# Patient Record
Sex: FEMALE | Race: WHITE | NOT HISPANIC OR LATINO | Employment: FULL TIME | URBAN - METROPOLITAN AREA
[De-identification: names, ages, dates, MRNs, and addresses within clinical notes are randomized per-mention and may not be internally consistent; named-entity substitution may affect disease eponyms.]

---

## 2017-02-27 DIAGNOSIS — M06.4 UNDIFFERENTIATED INFLAMMATORY ARTHRITIS (H): ICD-10-CM

## 2017-02-28 RX ORDER — HYDROXYCHLOROQUINE SULFATE 200 MG/1
200 TABLET, FILM COATED ORAL 2 TIMES DAILY
Qty: 180 TABLET | Refills: 1 | Status: SHIPPED | OUTPATIENT
Start: 2017-02-28 | End: 2017-10-06

## 2017-02-28 NOTE — TELEPHONE ENCOUNTER
PLAQUENIL 200 MG       Last Written Prescription Date:  9/30/16  Last Fill Quantity: 180,   # refills: 1  Last Office Visit : 12/16/16  Future Office visit:  6/23/17  EYE EXAM   9/28/16  OUTSIDE RECORD

## 2017-10-06 DIAGNOSIS — M06.4 UNDIFFERENTIATED INFLAMMATORY ARTHRITIS (H): ICD-10-CM

## 2017-10-06 NOTE — LETTER
Karlo Snow,    LAST EYE EXAM  9/28/16    Regular eye exams are required while taking Hydroxychloroquine (Plaquenil). These may be yearly or as determined by your eye specialist.    Although vision problems and loss of sight while taking hydroxchloroquine are very rare, notify your doctor if you notice changes in your vision. Visual changes experienced early on the medication or seen early during regular eye exams usually improve after stopping the medication.     Eye exams should be completed by an ophthalmologist who is experienced in monitoring for Hydroxchloroquine toxicity.    Exam may include visual field testing and slit lamp exam or other testing as determined by the ophthalmologist.     We received a refill request from your pharmacy. Your Hydroxychloroquine prescription has been refilled for 90 DAYS. Please request your eye clinic to fax or mail a copy of your eye exam report to our clinic indicating that testing was completed for toxicity screening.    Sincerely,  Rheumatology Staff

## 2017-10-09 NOTE — TELEPHONE ENCOUNTER
PLAQUENIL  200 MG       Last Written Prescription Date:  2/28/17  Last Fill Quantity: 180,   # refills: 1  Last Office Visit : 12/16/16  Future Office visit:  10/13/17  EYE  EXAM    9/28/16  OUTSIDE REC  OVERDUE EYE EXAM LETTER + 90 DAY RF

## 2017-10-13 ENCOUNTER — OFFICE VISIT (OUTPATIENT)
Dept: RHEUMATOLOGY | Facility: CLINIC | Age: 42
End: 2017-10-13
Attending: INTERNAL MEDICINE
Payer: COMMERCIAL

## 2017-10-13 VITALS
WEIGHT: 148.8 LBS | DIASTOLIC BLOOD PRESSURE: 84 MMHG | BODY MASS INDEX: 29.21 KG/M2 | TEMPERATURE: 98.6 F | RESPIRATION RATE: 18 BRPM | SYSTOLIC BLOOD PRESSURE: 136 MMHG | HEART RATE: 77 BPM | HEIGHT: 60 IN

## 2017-10-13 DIAGNOSIS — Z51.81 ENCOUNTER FOR MEDICATION MONITORING: ICD-10-CM

## 2017-10-13 DIAGNOSIS — M19.90 INFLAMMATORY ARTHRITIS: Primary | ICD-10-CM

## 2017-10-13 DIAGNOSIS — M19.90 INFLAMMATORY ARTHRITIS: ICD-10-CM

## 2017-10-13 LAB
ALBUMIN SERPL-MCNC: 4 G/DL (ref 3.4–5)
ALBUMIN UR-MCNC: NEGATIVE MG/DL
ALT SERPL W P-5'-P-CCNC: 35 U/L (ref 0–50)
APPEARANCE UR: ABNORMAL
AST SERPL W P-5'-P-CCNC: 19 U/L (ref 0–45)
BACTERIA #/AREA URNS HPF: ABNORMAL /HPF
BASOPHILS # BLD AUTO: 0.1 10E9/L (ref 0–0.2)
BASOPHILS NFR BLD AUTO: 0.8 %
BILIRUB UR QL STRIP: NEGATIVE
COLOR UR AUTO: YELLOW
CREAT SERPL-MCNC: 0.91 MG/DL (ref 0.52–1.04)
CREAT UR-MCNC: 124 MG/DL
CRP SERPL-MCNC: <2.9 MG/L (ref 0–8)
DIFFERENTIAL METHOD BLD: NORMAL
EOSINOPHIL # BLD AUTO: 0.1 10E9/L (ref 0–0.7)
EOSINOPHIL NFR BLD AUTO: 1.6 %
ERYTHROCYTE [DISTWIDTH] IN BLOOD BY AUTOMATED COUNT: 11.8 % (ref 10–15)
ERYTHROCYTE [SEDIMENTATION RATE] IN BLOOD BY WESTERGREN METHOD: 18 MM/H (ref 0–20)
GFR SERPL CREATININE-BSD FRML MDRD: 67 ML/MIN/1.7M2
GLUCOSE UR STRIP-MCNC: NEGATIVE MG/DL
HCT VFR BLD AUTO: 37.5 % (ref 35–47)
HGB BLD-MCNC: 12.6 G/DL (ref 11.7–15.7)
HGB UR QL STRIP: NEGATIVE
IMM GRANULOCYTES # BLD: 0 10E9/L (ref 0–0.4)
IMM GRANULOCYTES NFR BLD: 0.3 %
KETONES UR STRIP-MCNC: NEGATIVE MG/DL
LEUKOCYTE ESTERASE UR QL STRIP: ABNORMAL
LYMPHOCYTES # BLD AUTO: 1.8 10E9/L (ref 0.8–5.3)
LYMPHOCYTES NFR BLD AUTO: 25.2 %
MCH RBC QN AUTO: 30.5 PG (ref 26.5–33)
MCHC RBC AUTO-ENTMCNC: 33.6 G/DL (ref 31.5–36.5)
MCV RBC AUTO: 91 FL (ref 78–100)
MONOCYTES # BLD AUTO: 0.7 10E9/L (ref 0–1.3)
MONOCYTES NFR BLD AUTO: 9 %
MUCOUS THREADS #/AREA URNS LPF: PRESENT /LPF
NEUTROPHILS # BLD AUTO: 4.6 10E9/L (ref 1.6–8.3)
NEUTROPHILS NFR BLD AUTO: 63.1 %
NITRATE UR QL: NEGATIVE
NRBC # BLD AUTO: 0 10*3/UL
NRBC BLD AUTO-RTO: 0 /100
PH UR STRIP: 5 PH (ref 5–7)
PLATELET # BLD AUTO: 283 10E9/L (ref 150–450)
PROT UR-MCNC: 0.18 G/L
PROT/CREAT 24H UR: 0.15 G/G CR (ref 0–0.2)
RBC # BLD AUTO: 4.13 10E12/L (ref 3.8–5.2)
RBC #/AREA URNS AUTO: 1 /HPF (ref 0–2)
SOURCE: ABNORMAL
SP GR UR STRIP: 1.01 (ref 1–1.03)
SQUAMOUS #/AREA URNS AUTO: 4 /HPF (ref 0–1)
UROBILINOGEN UR STRIP-MCNC: 0 MG/DL (ref 0–2)
WBC # BLD AUTO: 7.3 10E9/L (ref 4–11)
WBC #/AREA URNS AUTO: 2 /HPF (ref 0–2)

## 2017-10-13 PROCEDURE — 84450 TRANSFERASE (AST) (SGOT): CPT | Performed by: INTERNAL MEDICINE

## 2017-10-13 PROCEDURE — 86147 CARDIOLIPIN ANTIBODY EA IG: CPT | Performed by: INTERNAL MEDICINE

## 2017-10-13 PROCEDURE — 86225 DNA ANTIBODY NATIVE: CPT | Performed by: INTERNAL MEDICINE

## 2017-10-13 PROCEDURE — 86146 BETA-2 GLYCOPROTEIN ANTIBODY: CPT | Performed by: INTERNAL MEDICINE

## 2017-10-13 PROCEDURE — 00000401 ZZHCL STATISTIC THROMBIN TIME NC: Performed by: INTERNAL MEDICINE

## 2017-10-13 PROCEDURE — 81001 URINALYSIS AUTO W/SCOPE: CPT | Performed by: INTERNAL MEDICINE

## 2017-10-13 PROCEDURE — 85025 COMPLETE CBC W/AUTO DIFF WBC: CPT | Performed by: INTERNAL MEDICINE

## 2017-10-13 PROCEDURE — 82040 ASSAY OF SERUM ALBUMIN: CPT | Performed by: INTERNAL MEDICINE

## 2017-10-13 PROCEDURE — 36415 COLL VENOUS BLD VENIPUNCTURE: CPT | Performed by: INTERNAL MEDICINE

## 2017-10-13 PROCEDURE — 99212 OFFICE O/P EST SF 10 MIN: CPT | Mod: ZF

## 2017-10-13 PROCEDURE — 85730 THROMBOPLASTIN TIME PARTIAL: CPT | Performed by: INTERNAL MEDICINE

## 2017-10-13 PROCEDURE — 84460 ALANINE AMINO (ALT) (SGPT): CPT | Performed by: INTERNAL MEDICINE

## 2017-10-13 PROCEDURE — 85652 RBC SED RATE AUTOMATED: CPT | Performed by: INTERNAL MEDICINE

## 2017-10-13 PROCEDURE — 82565 ASSAY OF CREATININE: CPT | Performed by: INTERNAL MEDICINE

## 2017-10-13 PROCEDURE — 86140 C-REACTIVE PROTEIN: CPT | Performed by: INTERNAL MEDICINE

## 2017-10-13 PROCEDURE — 00000167 ZZHCL STATISTIC INR NC: Performed by: INTERNAL MEDICINE

## 2017-10-13 PROCEDURE — 84156 ASSAY OF PROTEIN URINE: CPT | Performed by: INTERNAL MEDICINE

## 2017-10-13 PROCEDURE — 86038 ANTINUCLEAR ANTIBODIES: CPT | Performed by: INTERNAL MEDICINE

## 2017-10-13 PROCEDURE — 86160 COMPLEMENT ANTIGEN: CPT | Performed by: INTERNAL MEDICINE

## 2017-10-13 PROCEDURE — 85613 RUSSELL VIPER VENOM DILUTED: CPT | Performed by: INTERNAL MEDICINE

## 2017-10-13 RX ORDER — HYDROXYCHLOROQUINE SULFATE 200 MG/1
200 TABLET, FILM COATED ORAL 2 TIMES DAILY
Qty: 180 TABLET | Refills: 0 | Status: SHIPPED | OUTPATIENT
Start: 2017-10-13 | End: 2018-01-11

## 2017-10-13 ASSESSMENT — PAIN SCALES - GENERAL: PAINLEVEL: NO PAIN (0)

## 2017-10-13 NOTE — LETTER
10/13/2017      RE: Gwendolyn Melgar  [address] [facility] [id number]       Rheumatology F/U Note    Date of last visit: 12/16/2016  Today's visit date: 10/13/2017    Reason for visit: Seronegative non-erosive inflammatory arthritis        HPI from initial visit    Gwendolyn Melgar is a 38 yo WF who was referred to our clinic for evaluation and management of her IA.    On 1/30/2006, she woke up with pain/swelling of L 2nd finger. Was treated with prednisone and reportedly L hand MRI showed inflammatory arthritis. She had neg HLA-B27, NANCY, RF and anti-CCP in 2006. She was referred to rheumatology and was put on SSZ (? dose, ?2-3 tab twice a day). She was able to make a full fist and her joint sx improved. She was on that x 6 months and was told to stop it. Her arthritis was under good control x 3 yr.     In 2010, started having low back pain. She had problem with low back pain and getting out of the chair. At that time, had hand AM stiffness in AM to the point that could not open her hands. She was put on lodine. It helped her. Was told to taper the dose from 2 a day to 1 a day.    Last summer, they bought a new house and her father had double lung transplant. She started having low back pain in 10/2013, had severe pain to the point that she could not get off the floor without help. She was prescribed prednisone 20 max over 20 days. It helped her. Had unremarkable L hip x-ray. Never had SI joint x-ray or MRI.    Was recommended to start anti-TNF Tx but she is concerned about side effects and is seeking second opinion.      Reports fatigue. Currently has a cold with dry cough. Has h/o alopecia areata, has thin hair. Has occasional diarrhea sec anxiety, anxiety on lexapro, myalgia, occasional numbness/tingling ta night in hands which wakes her up at night.    Today, has pain around shoulders and hips which could be sec sitting at desc and cold.    Interval hx: Her extensive rheumatologic work up at initial visit was  negative. I diagnosed her with seroneg non-erosive IA and advised her to increase lodine from 1 to 2 a day which she did but could not tolerate it a sit caused GI upset. She had pain/tenderness/swelling of PIP joints with AM stiffness> 1 hr. Prescribed her  mg bid in 3/2014, she reports significant improvement of joint sx on it. Tolerates it well. Was also put her on cymbalta at the same time helped with her mood and back pain. She stopped cymbalta as had pregnancy plan, reports occasional LBP off cymbalta. Had leg cramps at last visit, zanaflex was prescribed but leg cramps resolved on its won and she did not try the zanaflex. R hip bursitis improved after doing PT.    She is feeling well today. Just found out to be pregnant on Maddox's day. She is 6 wk pregnant, is excited about it. Her TG is 10/17/2015. She is due for eye exam in 9/2015. She is still taking the HCQ and is helping, no joint pain/swelling or AM stiffness. Had pain over R foot on walking about 6 wk ago when she was in [place], it eventually resolved on its own.        12/2016: Doing well with no joint pain or arthritis flare. Now is going through IVF, had one round in 11/2016 which was not successful, now is going to do another round in January 2017. She developed R sciatica pain after 1st round of IVF which resolved after working with chiropractor. 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 I don't have her test results, she thinks they were tested only once.      Today: No arthritis flare up since last visit, doing well. Has ongoing aching around shoulders and low back which is intermittent, started to see a chiropractor and it's helping. She failed IVF, one fresh embryo (x2 embryos) transfer and one frozen embryo transfer. Now is on waiting list to get donor embryo, is hoping to get one by March 2018. 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 Her OB believes that + APS antibodies might explain the fetal loss at 12 wk when Gwendolyn got pregnant without assistance; however it was never confirmed, no genetic testing was done on fetus.    She reports no recurrence of hand stiffness or pain since she started taking HCQ.       ROS:  A comprehensive ROS was done, positives are per HPI.      Interval History (2015)  Pt states that she had a spontaneous  at approximately 12 weeks.  Since that time she has had a recurrence of her bilateral lower hip and back pain that started approximately 1-2 months after the miscarriage occurred.  Morning stiffness duration varies with activity and achiness that lasts for approximately on hour. Pt states that he rpain is currenlty a 1-2/10 at its maximum, 3-4/10 at maximum.  Ice and physical therapy alleviated pain.  She is not taking any analgesics.  Achiness can return at the end of the day.  Pt states taht she feels taht her symptoms are being well controlled on plaquenil and that she is not experiencing any side effects.  She states that she would like to remain on the plaquenil.  She is trying to become pregnant again, so she does not want to initiate any medications that are potentially teratogenic.  She states that she doesn't think that she needs a short course of prednisone to deal with her current symptoms    Pt states that in February she had an acute attack of right 1st metatarsophalangeal joint pain.  Pt denies callor, edema, or warmth.  Pt states that the pain is severe to the point that she can't walk on it.  Pain is worse than her normal arthritis pain.  Pt does not drink.  No association with seafood, organ meats.  Pt states that she was eating a lot of red meats during that period.     ROS:  Affirms SI joint and trochanteric pain  with 1 hour of morning stiffness.  Denies HA, fevers, chills, night sweats, changes in vision, sicca symptoms, ulcers, epistaxis, lumps and bumps in neck, chest pain, palpitations, SOB, stomach pain, n/v/d, constipation, hematochezia, melena, dysuria, hematuria, weakness of muscles, new rashes, raynaud's.    Today: Had neg HLA-B27 and neg pelvic MRI for sacroiliitis. Her SI joint pain resolved. Reports intermittent B/L shoulder pain and R hip bursitis pain but overall her pain much less and she did not have as much pain and problem with her joints this past winter. After having a miscarriage at 11 wk, now has infertility problem. Reports developing ovarian cyst after taking clomiphene, now is on both metformin and OC, will try fertility meds again after resolution of cysts. She reports having diarrhea on metformin but now it's better.    Her limited rheumatology records were reviewed.    This Document is currently in Final Status  Exam Accession# [id number]     Signs and Symptoms for Radiological Exam from [facility]:       * hip pain  pt states left hip pain no injury  [facility] [id number]  History from ID[facility]:       * ARTHROPATHY, UNSPECIFIED, SITE UNSPECIFIED; PAIN IN JOINT, PELVIC REGION AND THIGH  AP PELVIS AND LATERAL VIEW LEFT HIP 11/05/2012     COMPARISON: AP pelvis 05/11/2006.     FINDINGS: The pelvic ring is intact. Normal alignment of the left hip.  The joint spaces are symmetric. Tiny accessory ossicle at the lateral  aspect of the left acetabular roof. This is stable from the previous  study.     tt        Examination Interpreted at: [facility],   [place], [place]  The Interpreting Physician is FERNANDO DAVIS  Exam was completed at [facility]  Component      Latest Ref Rng 1/23/2014 1/23/2014          12:00 AM 12:00 AM   Sodium      137 - 145 mmol/L 137    Potassium      3.6 - 5.0 mmol/L 4.4    Chloride      98 - 107 mmol/L 99    CARBON DIOXIDE, TOTAL      22 - 35 mmol/L 26    Anion Gap       12     Glucose      65 - 100 mg/dL 100    Urea Nitrogen      7 - 20 mg/dL 14    Creatinine      0.5 - 1.0 mg/dL 0.7    Calcium      8.4 - 10.2 mg/dL 9.3    Protein Total      6.3 - 8.2 g/dL 7.1    Albumin      3.5 - 5.0 g/dL 4.2    Bilirubin Total      0.2 - 1.3 mg/dL 0.5    Alkaline Phosphatase      38 - 126 U/L 47    AST      14 - 59 U/L 26    ALT      9 - 72 U/L 28    RNP Antibodies      0.0 - 0.9 AI <0.2 <0.2   Kevin Antibodies      0.0 - 0.9 AI <0.2 <0.2   Scleroderma Antibody Scl-70 ANA CRISTINA IgG      0.0 - 0.9 AI  <0.2   Sjogren's Anti-SS-A      0.0 - 0.9 AI  <0.2   Sjogren's Anti-SS-B      0.0 - 0.9 AI  <0.2   Antichromatin Antibodies      0.0 - 0.9 AI  <0.2   Anti-Laura-1      0.0 - 0.9 Al  <0.2   Centomere B Atb      0.0 - 0.9 AI  <0.2   QuantiFERON TB Gold       Neg    QuantiFERON TB Ag Value       0.05    QuantiFERON Nil Value       0.05    QuantiFERON Mitogen Value       >10.00    QFT TB Ag minus Nil Value       0.00    NANCY Screen by EIA       Neg    Hepatitis C PANKAJ      0.0 - 0.9 0.1    Vitamin D,25-Hydroxy      30.0 - 100.0 ng/mL 34.8    HBsAg Screen       Neg    Hepatitis B Core Antibody       Neg    Complement C4      9 - 36 16    Complement C3      90 - 180 153    Rheumatoid Factor      0.0 - 13.9 KIU/L 9.8    C-Reactive Protein      0.0 - 4.9 mg/dL 2.7    Anti-DNA (DS) Ab Qn      0 - 9 IU/mL 9    CCP Antibodies      0 - 19 U/mL 3      Exam: Bilateral wrists, 3 views each. 1/10/2014.    Comparison: None.    Clinical history: Arthropathy.    Findings: 3 views each of the bilateral wrists were obtained. Joint  spaces are well-maintained. No erosive changes are noted. No soft  tissue abnormalities are seen.            Result Impression       Impression: No erosive changes in the bilateral wrists.    AVELINA HIGGINBOTHAM MD  I have personally reviewed the image and initial interpretation, and I  agree with findings.     Exam: Bilateral hands, 3 views each. 1/10/2014.    Comparison: None.    Clinical history:  Arthropathy.    Findings: 3 views each of the bilateral hands were obtained. The joint  spaces are well-maintained. No soft tissue abnormalities are noted. No  erosive changes are seen.            Result Impression       Impression: No erosive changes in the bilateral hands.    AVELINA HIGGINBOTHAM MD  I have personally reviewed the image and initial interpretation, and I  agree with findings.     Exam: Sacroiliac joints, 3 views. 1/10/2014.    Comparison: None.    Clinical history: Arthropathy.    Findings: 3 views of the sacroiliac joints were obtained. The  sacroiliac joints are patent. No abnormal sclerosis is noted. No  definite erosive changes are seen.            Result Impression       Impression: No acute bone abnormality in the sacroiliac joints.    AVELINA HIGGINBOTHAM MD  I have personally reviewed the image and initial interpretation, and I  agree with findings.          Component      Latest Ref Rng 2/20/2015   WBC      4.0 - 11.0 10e9/L 11.1 (H)   RBC Count      3.8 - 5.2 10e12/L 4.28   Hemoglobin      11.7 - 15.7 g/dL 13.1   Hematocrit      35.0 - 47.0 % 38.9   MCV      78 - 100 fl 91   MCH      26.5 - 33.0 pg 30.6   MCHC      31.5 - 36.5 g/dL 33.7   RDW      10.0 - 15.0 % 13.1   Platelet Count      150 - 450 10e9/L 238   Diff Method       Automated Method   % Neutrophils       66.4   % Lymphocytes       21.7   % Monocytes       9.6   % Eosinophils       1.5   % Basophils       0.4   % Immature Granulocytes       0.4   Absolute Neutrophil      1.6 - 8.3 10e9/L 7.4   Absolute Lymphocytes      0.8 - 5.3 10e9/L 2.4   Absolute Monoctyes      0.0 - 1.3 10e9/L 1.1   Absolute Eosinophils      0.0 - 0.7 10e9/L 0.2   Absolute Basophils      0.0 - 0.2 10e9/L 0.0   Abs Immature Granulocytes      0 - 0.4 10e9/L 0.0   Creatinine      0.52 - 1.04 mg/dL 0.72   GFR Estimate      >60 mL/min/1.7m2 90   GFR Estimate If Black      >60 mL/min/1.7m2 >90 . . .   CRP Inflammation      0.0 - 8.0 mg/L 3.5   Sed Rate      0 - 20 mm/h 9    Albumin      3.4 - 5.0 g/dL 3.8   ALT      0 - 50 U/L 67 (H)   AST      0 - 45 U/L 28     Component      Latest Ref Rng 8/21/2015   WBC      4.0 - 11.0 10e9/L 7.9   RBC Count      3.8 - 5.2 10e12/L 4.50   Hemoglobin      11.7 - 15.7 g/dL 13.8   Hematocrit      35.0 - 47.0 % 40.0   MCV      78 - 100 fl 89   MCH      26.5 - 33.0 pg 30.7   MCHC      31.5 - 36.5 g/dL 34.5   RDW      10.0 - 15.0 % 12.6   Platelet Count      150 - 450 10e9/L 273   Diff Method       Automated Method   % Neutrophils       66.4   % Lymphocytes       23.6   % Monocytes       8.0   % Eosinophils       1.3   % Basophils       0.3   % Immature Granulocytes       0.4   Absolute Neutrophil      1.6 - 8.3 10e9/L 5.3   Absolute Lymphocytes      0.8 - 5.3 10e9/L 1.9   Absolute Monoctyes      0.0 - 1.3 10e9/L 0.6   Absolute Eosinophils      0.0 - 0.7 10e9/L 0.1   Absolute Basophils      0.0 - 0.2 10e9/L 0.0   Abs Immature Granulocytes      0 - 0.4 10e9/L 0.0   Creatinine      0.52 - 1.04 mg/dL 0.78   GFR Estimate      >60 mL/min/1.7m2 81   GFR Estimate If Black      >60 mL/min/1.7m2 >90 . . .   [id number] Method       SSOP   B locus       B27 Neg   CRP Inflammation      0.0 - 8.0 mg/L <2.9   Sed Rate      0 - 20 mm/h 11   Albumin      3.4 - 5.0 g/dL 4.2   ALT      0 - 50 U/L 50   AST      0 - 45 U/L 29   HLA B27 Typing       Specimen received - Immunology report to follow upon completion.   Exam: MRI of the sacroiliac joints dated 10/21/2015.  Comparison: 1/10/2014.  Clinical history: Evaluate for sacroiliitis.  Technique: Multiplanar, multisequence MR imaging of the sacroiliac  joints were obtained using standard sequences in 2 orthogonal planes  before and after the uneventful administration of intravenous  gadolinium contrast.  Findings:  No significant fluid in the sacroiliac joints. No abnormal enhancement  to suggest sacroiliitis. No erosive changes are noted.  The muscle bulk is intact without significant atrophy or edema. The  visualized  intrapelvic structures are unremarkable. No  lymphadenopathy. No full-thickness tear or tendon retraction.  No abnormal marrow signal to suggest fracture, osteonecrosis, or  marrow infiltration. Nonspecific subtle focus of T2 hyperintensity  within the left iliac bone, coronal series 3 image 10, likely  vascularity.  Large field-of-view limits evaluation the hip joints. No  full-thickness cartilage loss or joint effusion. The visualized lower  lumbar spine is unremarkable.  IMPRESSION  Impression:  1. No findings to suggest sacroiliitis.  2. No abnormal marrow signal to suggest fracture, osteonecrosis, or  marrow infiltration.  AVELINA HIGGINBOTHAM MD  Component      Latest Ref Rng & Units 12/16/2016   Cardiolipin Antibody IgG      0.0 - 19.9 GPL-U/mL 3.2   Cardiolipin Antibody IgM      0.0 - 19.9 MPL-U/mL 27.7 (H)   Cardiolipin Antibody IgA      0.0 - 19.9 APL U/mL 5.7   Beta 2 Glycoprotein 1 Antibody IgA      <7 U/mL 1.6     HISTORY REVIEW:  Past Medical History:   Diagnosis Date     Asthma      Hypertension      Inflammatory arthritis      Past Surgical History:   Procedure Laterality Date     CHOLECYSTECTOMY       GALLBLADDER SURGERY       TONSILLECTOMY       Family History   Problem Relation Age of Onset     Lupus Paternal Aunt      father had double lung transplant for alpha 1 anti-trypsin def     Arthritis Maternal Grandmother      RA     Hypertension Maternal Grandmother      Depression Maternal Grandmother      Arthritis Paternal Grandfather      RA     Family History Negative Other      neg for AS, psoriasis     GASTROINTESTINAL DISEASE Other      cousin has colitis     DIABETES Father      due to transplant     Allergies Father      DIABETES Other      Aunts on both sides     Hypertension Mother      Allergies Mother      Hypertension Maternal Grandfather      Arthritis Maternal Grandfather      Arthritis Paternal Grandmother      Other - See Comments Other      fibromyalgia - paternal aunt     Social History      Social History     Marital status:      Spouse name: N/A     Number of children: 0     Years of education: N/A     Occupational History      [employer]     Social History Main Topics     Smoking status: Never Smoker     Smokeless tobacco: Never Used     Alcohol use Yes      Comment: occ     Drug use: No     Sexual activity: Not on file     Other Topics Concern     Not on file     Social History Narrative       PMHx, FHx, SHx were reviewed, unchanged.    Pregnancy Hx: She is  s/p one miscarriage around 12 wk    Outpatient Encounter Prescriptions as of 10/13/2017   Medication Sig Dispense Refill     hydroxychloroquine (PLAQUENIL) 200 MG tablet Take 1 tablet (200 mg) by mouth 2 times daily 180 tablet 1     metFORMIN (GLUCOPHAGE) 500 MG tablet        norethindrone-ethinyl estradiol-iron (ESTROSTEP FE) 1-20/1-30/1-35 MG-MCG per tablet Take 1 tablet by mouth daily       cetirizine (ZYRTEC) 10 MG tablet Take 10 mg by mouth as needed for allergies       labetalol (NORMODYNE) 200 MG tablet Take 100 mg by mouth 2 times daily       Prenatal Multivit-Min-Fe-FA (PRENATAL VITAMINS PO) With iron       VITAMIN D, CHOLECALCIFEROL, PO Take 2,000 Units by mouth daily       albuterol (VENTOLIN HFA) 108 (90 BASE) MCG/ACT inhaler Inhale 2 Puffs into the lungs four times a day as needed for Wheezing or Shortness of Breath (as needed). Shake before using.       fluticasone (FLOVENT HFA) 110 MCG/ACT inhaler Inhale 2 Puffs into the lungs two times a day. 4 puffs twice daily in yellow zone       No facility-administered encounter medications on file as of 10/13/2017.      Allergies   Allergen Reactions     Tetanus Immune Globulin      Fever     Tetanus Toxoid            Ph.E:    Vitals:    10/13/17 1431   BP: 136/84   Pulse: 77   Resp: 18   Temp: 98.6  F (37  C)   TempSrc: Oral   Weight: 67.5 kg (148 lb 12.8 oz)   Height: 1.524 m (5')       Constitutional: WD/WN. Pleasant. In no acute distress.   Eyes: EOM intact,  PERRLA, sclera anicteric, conj not injected  HEENT: No oral ulcers or thrush. Normal salivary pool.  Neck: No cervical LAP  Chest: Clear to auscultation bilaterally  CV: RRR, no murmurs/ rubs or gallops. No edema, clubbing or cyanosis.   GI: Abdomen is soft and non tender.     MS: no joint tenderness. No active synovitis. No joint deformities.  No nodules. +FM TP  Skin: No skin rash, malar rash, livedo, periungual erythema,digital ulcers or nail changes. + thinning of hair (allopecia).  Neuro: A&O x 3. 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 Received MRI report of L index finger 4/06  which showed L index finger edema from PIP to DIP (non specific finding). Her work up at initial visit in 1/2014 was suggestive of seroneg non-erosive IA. She was recommended to increase lodine to 2 tab a day but could not tolerate it sec GI upset. Has FM TP but FMS can't explain all her pain including pain/tenderness/swelling over PIP joints, AM stiffness> 1hr and good response to steroid/SSZ in the past. For AI, recommended a trial of HCQ. She is on HCQ since 3/2014 with excellent response. Her IA is under excellent control on HCQ.    -neg HLA-B27 and neg pelvic MRI 10/2015 for sacroiliitis, SI joint pain resolved. Now has intermittent low back and shoulder pain, which is most likely due to FMS, seeing a chiropractor helps.    - continue plaquenil 200mg PO BID, was informed that's safe in pregnancy    -Recommend eye exam for HCQ monitoring. Referral was made.    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 She was put on ASA+lovenox during IVF. She failed IVF fresh  embryo (two) and leftover frozen embryo (one) transfer and was told given her age her best option would be to use donor embryo and now is on waiting list, hopes to get one in March 2018. She is recommended to use ASA 81 mg qd+lovenox during future pregnancy with donor embryo and I agree given her h/os miscarriage at 12 wk. She ahs no h/o thrombosis. She still does not meet criteria for APS syndrome as miscarriage was still considered 1st trimester miscarriage.    She always had neg NANCY here and no features of SLE, I will re-check NANCY along with APS Abs and lupus serology.     - follow-up in 12 months      MEDICATION CHANGES: None.    Orders Placed This Encounter   Procedures     CBC with platelets differential     AST     ALT     Albumin level     Creatinine     Complement C4     Complement C3     CRP inflammation     Erythrocyte sedimentation rate auto     DNA double stranded antibodies     NANCY by IFA: Laboratory Miscellaneous Order     Cardiolipin Edith IgG and IgM     OPHTHALMOLOGY ADULT REFERRAL           Marie Charles MD

## 2017-10-13 NOTE — MR AVS SNAPSHOT
After Visit Summary   10/13/2017    Gwendolyn Melgar    MRN: [id number]           Patient Information     Date Of Birth          1975        Visit Information        Provider Department      10/13/2017 2:30 PM Marie Charles MD [facility] Rheumatology        Today's Diagnoses     Inflammatory arthritis    -  1    Encounter for medication monitoring          Care Instructions    Labs today      Return in a year          Follow-ups after your visit        Additional Services     OPHTHALMOLOGY ADULT REFERRAL       For HCQ monitoring                  Follow-up notes from your care team     Return in about 1 year (around 10/13/2018).      Your next 10 appointments already scheduled     Oct 13, 2017  3:00 PM CDT   Lab with  LAB   [facility] ([facility])    [address] [place] [address] [id number] [phone]            Oct 12, 2018 10:30 AM CDT   (Arrive by 10:15 AM)   Return Visit with Marie Charles MD   [facility] Rheumatology ([facility])    [address] [place] [address] [id number] [phone]              Future tests that were ordered for you today     Open Future Orders        Priority Expected Expires Ordered    CBC with platelets differential Routine  10/13/2018 10/13/2017    AST Routine  10/13/2018 10/13/2017    ALT Routine  10/13/2018 10/13/2017    Albumin level Routine  10/13/2018 10/13/2017    Creatinine Routine  10/13/2018 10/13/2017    Complement C4 Routine  10/13/2018 10/13/2017    Complement C3 Routine  10/13/2018 10/13/2017    CRP inflammation Routine  10/13/2018 10/13/2017    Erythrocyte sedimentation rate auto Routine  10/13/2018 10/13/2017    DNA double stranded antibodies Routine  10/13/2018 10/13/2017    NANCY by IFA: Laboratory Miscellaneous Order Routine  10/13/2018 10/13/2017            Who to contact     If you have questions or need follow up information about today's clinic visit or  your schedule please contact [facility] RHEUMATOLOGY directly at [phone].  Normal or non-critical lab and imaging results will be communicated to you by MyChart, letter or phone within 4 business days after the clinic has received the results. If you do not hear from us within 7 days, please contact the clinic through Net[employer]agon[employer] or phone. If you have a critical or abnormal lab result, we will notify you by phone as soon as possible.  Submit refill requests through [employer] or call your pharmacy and they will forward the refill request to us. Please allow 3 business days for your refill to be completed.          Additional Information About Your Visit        [employer] Information     [employer] gives you secure access to your electronic health record. If you see a primary care provider, you can also send messages to your care team and make appointments. If you have questions, please call your primary care clinic.  If you do not have a primary care provider, please call [phone] and they will assist you.        Care EveryWhere ID     This is your Care EveryWhere ID. This could be used by other organizations to access your [place] medical records  [id number]        Your Vitals Were     Pulse Temperature Respirations Height BMI (Body Mass Index)       77 98.6  F (37  C) (Oral) 18 1.524 m (5') 29.06 kg/m2        Blood Pressure from Last 3 Encounters:   10/13/17 136/84   12/16/16 (!) 166/98   03/23/16 129/76    Weight from Last 3 Encounters:   10/13/17 67.5 kg (148 lb 12.8 oz)   12/16/16 68.6 kg (151 lb 3.2 oz)   03/23/16 70.8 kg (156 lb)              We Performed the Following     Cardiolipin Edith IgG and IgM     Lupus Anticoagulant Panel     OPHTHALMOLOGY ADULT REFERRAL        Primary Care Provider    Katy Obrien       No address on file        Equal Access to Services     NICOLE WARNER : Anahi Fernandes, nalini delvalle, jared banks, bebo johnston. So [facility]  [phone].    ATENCIÓN: Si vincent chacon, tiene a winter disposición servicios gratuitos de asistencia lingüística. Selene page [phone].    We comply with applicable federal civil rights laws and [place] laws. We do not discriminate on the basis of race, color, national origin, age, disability, sex, sexual orientation, or gender identity.            Thank you!     Thank you for choosing [facility] RHEUMATOLOGY  for your care. Our goal is always to provide you with excellent care. Hearing back from our patients is one way we can continue to improve our services. Please take a few minutes to complete the written survey that you may receive in the mail after your visit with us. Thank you!             Your Updated Medication List - Protect others around you: Learn how to safely use, store and throw away your medicines at www.disposemymeds.org.          This list is accurate as of: 10/13/17  2:54 PM.  Always use your most recent med list.                   Brand Name Dispense Instructions for use Diagnosis    cetirizine 10 MG tablet    zyrTEC     Take 10 mg by mouth as needed for allergies        FLOVENT  MCG/ACT Inhaler   Generic drug:  fluticasone      Inhale 2 Puffs into the lungs two times a day. 4 puffs twice daily in yellow zone        hydroxychloroquine 200 MG tablet    PLAQUENIL    180 tablet    Take 1 tablet (200 mg) by mouth 2 times daily    Undifferentiated inflammatory arthritis (H)       labetalol 200 MG tablet    NORMODYNE     Take 100 mg by mouth 2 times daily        metFORMIN 500 MG tablet    GLUCOPHAGE          norethindrone-ethinyl estradiol-iron 1-20/1-30/1-35 MG-MCG per tablet    ESTROSTEP FE     Take 1 tablet by mouth daily        PRENATAL VITAMINS PO      With iron        VENTOLIN  (90 BASE) MCG/ACT Inhaler   Generic drug:  albuterol      Inhale 2 Puffs into the lungs four times a day as needed for Wheezing or Shortness of Breath (as needed). Shake before using.        VITAMIN D  (CHOLECALCIFEROL) PO      Take 2,000 Units by mouth daily

## 2017-10-13 NOTE — PROGRESS NOTES
Rheumatology F/U Note    Date of last visit: 12/16/2016  Today's visit date: 10/13/2017    Reason for visit: Seronegative non-erosive inflammatory arthritis        HPI from initial visit    Gwendolyn Melgar is a 40 yo WF who was referred to our clinic for evaluation and management of her IA.    On 1/30/2006, she woke up with pain/swelling of L 2nd finger. Was treated with prednisone and reportedly L hand MRI showed inflammatory arthritis. She had neg HLA-B27, NANCY, RF and anti-CCP in 2006. She was referred to rheumatology and was put on SSZ (? dose, ?2-3 tab twice a day). She was able to make a full fist and her joint sx improved. She was on that x 6 months and was told to stop it. Her arthritis was under good control x 3 yr.     In 2010, started having low back pain. She had problem with low back pain and getting out of the chair. At that time, had hand AM stiffness in AM to the point that could not open her hands. She was put on lodine. It helped her. Was told to taper the dose from 2 a day to 1 a day.    Last summer, they bought a new house and her father had double lung transplant. She started having low back pain in 10/2013, had severe pain to the point that she could not get off the floor without help. She was prescribed prednisone 20 max over 20 days. It helped her. Had unremarkable L hip x-ray. Never had SI joint x-ray or MRI.    Was recommended to start anti-TNF Tx but she is concerned about side effects and is seeking second opinion.      Reports fatigue. Currently has a cold with dry cough. Has h/o alopecia areata, has thin hair. Has occasional diarrhea sec anxiety, anxiety on lexapro, myalgia, occasional numbness/tingling ta night in hands which wakes her up at night.    Today, has pain around shoulders and hips which could be sec sitting at desc and cold.    Interval hx: Her extensive rheumatologic work up at initial visit was negative. I diagnosed her with seroneg non-erosive IA and advised her to increase  lodine from 1 to 2 a day which she did but could not tolerate it a sit caused GI upset. She had pain/tenderness/swelling of PIP joints with AM stiffness> 1 hr. Prescribed her  mg bid in 3/2014, she reports significant improvement of joint sx on it. Tolerates it well. Was also put her on cymbalta at the same time helped with her mood and back pain. She stopped cymbalta as had pregnancy plan, reports occasional LBP off cymbalta. Had leg cramps at last visit, zanaflex was prescribed but leg cramps resolved on its won and she did not try the zanaflex. R hip bursitis improved after doing PT.    She is feeling well today. Just found out to be pregnant on Maddox's day. She is 6 wk pregnant, is excited about it. Her TG is 10/17/2015. She is due for eye exam in 9/2015. She is still taking the HCQ and is helping, no joint pain/swelling or AM stiffness. Had pain over R foot on walking about 6 wk ago when she was in [place], it eventually resolved on its own.        12/2016: Doing well with no joint pain or arthritis flare. Now is going through IVF, had one round in 11/2016 which was not successful, now is going to do another round in January 2017. She developed R sciatica pain after 1st round of IVF which resolved after working with chiropractor. 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 I don't have her test results, she thinks they were tested only once.      Today: No arthritis flare up since last visit, doing well. Has ongoing aching around shoulders and low back which is intermittent, started to see a chiropractor and it's helping. She failed IVF, one fresh embryo (x2 embryos) transfer and one frozen embryo transfer. Now is on waiting list to get donor embryo, is hoping to get one by March 2018. 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 Her OB believes that + APS antibodies might explain the fetal loss at 12 wk when Gwendolyn got pregnant without assistance; however it was never confirmed, no genetic testing was done on fetus.    She reports no recurrence of hand stiffness or pain since she started taking HCQ.       ROS:  A comprehensive ROS was done, positives are per HPI.      Interval History (2015)  Pt states that she had a spontaneous  at approximately 12 weeks.  Since that time she has had a recurrence of her bilateral lower hip and back pain that started approximately 1-2 months after the miscarriage occurred.  Morning stiffness duration varies with activity and achiness that lasts for approximately on hour. Pt states that he rpain is currenlty a 1-2/10 at its maximum, 3-4/10 at maximum.  Ice and physical therapy alleviated pain.  She is not taking any analgesics.  Achiness can return at the end of the day.  Pt states taht she feels taht her symptoms are being well controlled on plaquenil and that she is not experiencing any side effects.  She states that she would like to remain on the plaquenil.  She is trying to become pregnant again, so she does not want to initiate any medications that are potentially teratogenic.  She states that she doesn't think that she needs a short course of prednisone to deal with her current symptoms    Pt states that in February she had an acute attack of right 1st metatarsophalangeal joint pain.  Pt denies callor, edema, or warmth.  Pt states that the pain is severe to the point that she can't walk on it.  Pain is worse than her normal arthritis pain.  Pt does not drink.  No association with seafood, organ meats.  Pt states that she was eating a lot of red meats during that period.     ROS:  Affirms SI joint and trochanteric pain with 1 hour of morning stiffness.  Denies HA, fevers, chills, night sweats,  changes in vision, sicca symptoms, ulcers, epistaxis, lumps and bumps in neck, chest pain, palpitations, SOB, stomach pain, n/v/d, constipation, hematochezia, melena, dysuria, hematuria, weakness of muscles, new rashes, raynaud's.    Today: Had neg HLA-B27 and neg pelvic MRI for sacroiliitis. Her SI joint pain resolved. Reports intermittent B/L shoulder pain and R hip bursitis pain but overall her pain much less and she did not have as much pain and problem with her joints this past winter. After having a miscarriage at 11 wk, now has infertility problem. Reports developing ovarian cyst after taking clomiphene, now is on both metformin and OC, will try fertility meds again after resolution of cysts. She reports having diarrhea on metformin but now it's better.    Her limited rheumatology records were reviewed.    This Document is currently in Final Status  Exam Accession# [id number]     Signs and Symptoms for Radiological Exam from [facility]:       * hip pain  pt states left hip pain no injury  [id number]  History from [facility]:       * ARTHROPATHY, UNSPECIFIED, SITE UNSPECIFIED; PAIN IN JOINT, PELVIC REGION AND THIGH  AP PELVIS AND LATERAL VIEW LEFT HIP 11/05/2012     COMPARISON: AP pelvis 05/11/2006.     FINDINGS: The pelvic ring is intact. Normal alignment of the left hip.  The joint spaces are symmetric. Tiny accessory ossicle at the lateral  aspect of the left acetabular roof. This is stable from the previous  study.     tt        Examination Interpreted at: [facility],   [place], [place]  The Interpreting Physician is FERNANDO DAVIS  Exam was completed at [facility]  Component      Latest Ref Rng 1/23/2014 1/23/2014          12:00 AM 12:00 AM   Sodium      137 - 145 mmol/L 137    Potassium      3.6 - 5.0 mmol/L 4.4    Chloride      98 - 107 mmol/L 99    CARBON DIOXIDE, TOTAL      22 - 35 mmol/L 26    Anion Gap       12    Glucose      65 - 100 mg/dL 100    Urea Nitrogen      7 - 20 mg/dL 14     Creatinine      0.5 - 1.0 mg/dL 0.7    Calcium      8.4 - 10.2 mg/dL 9.3    Protein Total      6.3 - 8.2 g/dL 7.1    Albumin      3.5 - 5.0 g/dL 4.2    Bilirubin Total      0.2 - 1.3 mg/dL 0.5    Alkaline Phosphatase      38 - 126 U/L 47    AST      14 - 59 U/L 26    ALT      9 - 72 U/L 28    RNP Antibodies      0.0 - 0.9 AI <0.2 <0.2   Kevin Antibodies      0.0 - 0.9 AI <0.2 <0.2   Scleroderma Antibody Scl-70 ANA CRISTINA IgG      0.0 - 0.9 AI  <0.2   Sjogren's Anti-SS-A      0.0 - 0.9 AI  <0.2   Sjogren's Anti-SS-B      0.0 - 0.9 AI  <0.2   Antichromatin Antibodies      0.0 - 0.9 AI  <0.2   Anti-Laura-1      0.0 - 0.9 Al  <0.2   Centomere B Atb      0.0 - 0.9 AI  <0.2   QuantiFERON TB Gold       Neg    QuantiFERON TB Ag Value       0.05    QuantiFERON Nil Value       0.05    QuantiFERON Mitogen Value       >10.00    QFT TB Ag minus Nil Value       0.00    NANCY Screen by EIA       Neg    Hepatitis C PANKAJ      0.0 - 0.9 0.1    Vitamin D,25-Hydroxy      30.0 - 100.0 ng/mL 34.8    HBsAg Screen       Neg    Hepatitis B Core Antibody       Neg    Complement C4      9 - 36 16    Complement C3      90 - 180 153    Rheumatoid Factor      0.0 - 13.9 KIU/L 9.8    C-Reactive Protein      0.0 - 4.9 mg/dL 2.7    Anti-DNA (DS) Ab Qn      0 - 9 IU/mL 9    CCP Antibodies      0 - 19 U/mL 3      Exam: Bilateral wrists, 3 views each. 1/10/2014.    Comparison: None.    Clinical history: Arthropathy.    Findings: 3 views each of the bilateral wrists were obtained. Joint  spaces are well-maintained. No erosive changes are noted. No soft  tissue abnormalities are seen.            Result Impression       Impression: No erosive changes in the bilateral wrists.    AVELINA HIGGINBOTHAM MD  I have personally reviewed the image and initial interpretation, and I  agree with findings.     Exam: Bilateral hands, 3 views each. 1/10/2014.    Comparison: None.    Clinical history: Arthropathy.    Findings: 3 views each of the bilateral hands were obtained. The  joint  spaces are well-maintained. No soft tissue abnormalities are noted. No  erosive changes are seen.            Result Impression       Impression: No erosive changes in the bilateral hands.    AVELINA HIGGINBOTHAM MD  I have personally reviewed the image and initial interpretation, and I  agree with findings.     Exam: Sacroiliac joints, 3 views. 1/10/2014.    Comparison: None.    Clinical history: Arthropathy.    Findings: 3 views of the sacroiliac joints were obtained. The  sacroiliac joints are patent. No abnormal sclerosis is noted. No  definite erosive changes are seen.            Result Impression       Impression: No acute bone abnormality in the sacroiliac joints.    AVELINA HIGGINBOTHAM MD  I have personally reviewed the image and initial interpretation, and I  agree with findings.          Component      Latest Ref Rng 2/20/2015   WBC      4.0 - 11.0 10e9/L 11.1 (H)   RBC Count      3.8 - 5.2 10e12/L 4.28   Hemoglobin      11.7 - 15.7 g/dL 13.1   Hematocrit      35.0 - 47.0 % 38.9   MCV      78 - 100 fl 91   MCH      26.5 - 33.0 pg 30.6   MCHC      31.5 - 36.5 g/dL 33.7   RDW      10.0 - 15.0 % 13.1   Platelet Count      150 - 450 10e9/L 238   Diff Method       Automated Method   % Neutrophils       66.4   % Lymphocytes       21.7   % Monocytes       9.6   % Eosinophils       1.5   % Basophils       0.4   % Immature Granulocytes       0.4   Absolute Neutrophil      1.6 - 8.3 10e9/L 7.4   Absolute Lymphocytes      0.8 - 5.3 10e9/L 2.4   Absolute Monoctyes      0.0 - 1.3 10e9/L 1.1   Absolute Eosinophils      0.0 - 0.7 10e9/L 0.2   Absolute Basophils      0.0 - 0.2 10e9/L 0.0   Abs Immature Granulocytes      0 - 0.4 10e9/L 0.0   Creatinine      0.52 - 1.04 mg/dL 0.72   GFR Estimate      >60 mL/min/1.7m2 90   GFR Estimate If Black      >60 mL/min/1.7m2 >90 . . .   CRP Inflammation      0.0 - 8.0 mg/L 3.5   Sed Rate      0 - 20 mm/h 9   Albumin      3.4 - 5.0 g/dL 3.8   ALT      0 - 50 U/L 67 (H)   AST      0 - 45  U/L 28     Component      Latest Ref Rng 8/21/2015   WBC      4.0 - 11.0 10e9/L 7.9   RBC Count      3.8 - 5.2 10e12/L 4.50   Hemoglobin      11.7 - 15.7 g/dL 13.8   Hematocrit      35.0 - 47.0 % 40.0   MCV      78 - 100 fl 89   MCH      26.5 - 33.0 pg 30.7   MCHC      31.5 - 36.5 g/dL 34.5   RDW      10.0 - 15.0 % 12.6   Platelet Count      150 - 450 10e9/L 273   Diff Method       Automated Method   % Neutrophils       66.4   % Lymphocytes       23.6   % Monocytes       8.0   % Eosinophils       1.3   % Basophils       0.3   % Immature Granulocytes       0.4   Absolute Neutrophil      1.6 - 8.3 10e9/L 5.3   Absolute Lymphocytes      0.8 - 5.3 10e9/L 1.9   Absolute Monoctyes      0.0 - 1.3 10e9/L 0.6   Absolute Eosinophils      0.0 - 0.7 10e9/L 0.1   Absolute Basophils      0.0 - 0.2 10e9/L 0.0   Abs Immature Granulocytes      0 - 0.4 10e9/L 0.0   Creatinine      0.52 - 1.04 mg/dL 0.78   GFR Estimate      >60 mL/min/1.7m2 81   GFR Estimate If Black      >60 mL/min/1.7m2 >90 . . .   [id number] Method       SSOP   B locus       B27 Neg   CRP Inflammation      0.0 - 8.0 mg/L <2.9   Sed Rate      0 - 20 mm/h 11   Albumin      3.4 - 5.0 g/dL 4.2   ALT      0 - 50 U/L 50   AST      0 - 45 U/L 29   HLA B27 Typing       Specimen received - Immunology report to follow upon completion.   Exam: MRI of the sacroiliac joints dated 10/21/2015.  Comparison: 1/10/2014.  Clinical history: Evaluate for sacroiliitis.  Technique: Multiplanar, multisequence MR imaging of the sacroiliac  joints were obtained using standard sequences in 2 orthogonal planes  before and after the uneventful administration of intravenous  gadolinium contrast.  Findings:  No significant fluid in the sacroiliac joints. No abnormal enhancement  to suggest sacroiliitis. No erosive changes are noted.  The muscle bulk is intact without significant atrophy or edema. The  visualized intrapelvic structures are unremarkable. No  lymphadenopathy. No full-thickness tear or  tendon retraction.  No abnormal marrow signal to suggest fracture, osteonecrosis, or  marrow infiltration. Nonspecific subtle focus of T2 hyperintensity  within the left iliac bone, coronal series 3 image 10, likely  vascularity.  Large field-of-view limits evaluation the hip joints. No  full-thickness cartilage loss or joint effusion. The visualized lower  lumbar spine is unremarkable.  IMPRESSION  Impression:  1. No findings to suggest sacroiliitis.  2. No abnormal marrow signal to suggest fracture, osteonecrosis, or  marrow infiltration.  AVELINA HIGGINOBTHAM MD  Component      Latest Ref Rng & Units 12/16/2016   Cardiolipin Antibody IgG      0.0 - 19.9 GPL-U/mL 3.2   Cardiolipin Antibody IgM      0.0 - 19.9 MPL-U/mL 27.7 (H)   Cardiolipin Antibody IgA      0.0 - 19.9 APL U/mL 5.7   Beta 2 Glycoprotein 1 Antibody IgA      <7 U/mL 1.6     HISTORY REVIEW:  Past Medical History:   Diagnosis Date     Asthma      Hypertension      Inflammatory arthritis      Past Surgical History:   Procedure Laterality Date     CHOLECYSTECTOMY       GALLBLADDER SURGERY       TONSILLECTOMY       Family History   Problem Relation Age of Onset     Lupus Paternal Aunt      father had double lung transplant for alpha 1 anti-trypsin def     Arthritis Maternal Grandmother      RA     Hypertension Maternal Grandmother      Depression Maternal Grandmother      Arthritis Paternal Grandfather      RA     Family History Negative Other      neg for AS, psoriasis     GASTROINTESTINAL DISEASE Other      cousin has colitis     DIABETES Father      due to transplant     Allergies Father      DIABETES Other      Aunts on both sides     Hypertension Mother      Allergies Mother      Hypertension Maternal Grandfather      Arthritis Maternal Grandfather      Arthritis Paternal Grandmother      Other - See Comments Other      fibromyalgia - paternal aunt     Social History     Social History     Marital status:      Spouse name: N/A     Number of  children: 0     Years of education: N/A     Occupational History      D[employer]     Social History Main Topics     Smoking status: Never Smoker     Smokeless tobacco: Never Used     Alcohol use Yes      Comment: occ     Drug use: No     Sexual activity: Not on file     Other Topics Concern     Not on file     Social History Narrative       PMHx, FHx, SHx were reviewed, unchanged.    Pregnancy Hx: She is  s/p one miscarriage around 12 wk    Outpatient Encounter Prescriptions as of 10/13/2017   Medication Sig Dispense Refill     hydroxychloroquine (PLAQUENIL) 200 MG tablet Take 1 tablet (200 mg) by mouth 2 times daily 180 tablet 1     metFORMIN (GLUCOPHAGE) 500 MG tablet        norethindrone-ethinyl estradiol-iron (ESTROSTEP FE) 1-20/1-30/1-35 MG-MCG per tablet Take 1 tablet by mouth daily       cetirizine (ZYRTEC) 10 MG tablet Take 10 mg by mouth as needed for allergies       labetalol (NORMODYNE) 200 MG tablet Take 100 mg by mouth 2 times daily       Prenatal Multivit-Min-Fe-FA (PRENATAL VITAMINS PO) With iron       VITAMIN D, CHOLECALCIFEROL, PO Take 2,000 Units by mouth daily       albuterol (VENTOLIN HFA) 108 (90 BASE) MCG/ACT inhaler Inhale 2 Puffs into the lungs four times a day as needed for Wheezing or Shortness of Breath (as needed). Shake before using.       fluticasone (FLOVENT HFA) 110 MCG/ACT inhaler Inhale 2 Puffs into the lungs two times a day. 4 puffs twice daily in yellow zone       No facility-administered encounter medications on file as of 10/13/2017.      Allergies   Allergen Reactions     Tetanus Immune Globulin      Fever     Tetanus Toxoid            Ph.E:    Vitals:    10/13/17 1431   BP: 136/84   Pulse: 77   Resp: 18   Temp: 98.6  F (37  C)   TempSrc: Oral   Weight: 67.5 kg (148 lb 12.8 oz)   Height: 1.524 m (5')       Constitutional: WD/WN. Pleasant. In no acute distress.   Eyes: EOM intact, PERRLA, sclera anicteric, conj not injected  HEENT: No oral ulcers or thrush. Normal  salivary pool.  Neck: No cervical LAP  Chest: Clear to auscultation bilaterally  CV: RRR, no murmurs/ rubs or gallops. No edema, clubbing or cyanosis.   GI: Abdomen is soft and non tender.     MS: no joint tenderness. No active synovitis. No joint deformities.  No nodules. +FM TP  Skin: No skin rash, malar rash, livedo, periungual erythema,digital ulcers or nail changes. + thinning of hair (allopecia).  Neuro: A&O x 3. 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 Received MRI report of L index finger 4/06  which showed L index finger edema from PIP to DIP (non specific finding). Her work up at initial visit in 1/2014 was suggestive of seroneg non-erosive IA. She was recommended to increase lodine to 2 tab a day but could not tolerate it sec GI upset. Has FM TP but FMS can't explain all her pain including pain/tenderness/swelling over PIP joints, AM stiffness> 1hr and good response to steroid/SSZ in the past. For AI, recommended a trial of HCQ. She is on HCQ since 3/2014 with excellent response. Her IA is under excellent control on HCQ.    -neg HLA-B27 and neg pelvic MRI 10/2015 for sacroiliitis, SI joint pain resolved. Now has intermittent low back and shoulder pain, which is most likely due to FMS, seeing a chiropractor helps.    - continue plaquenil 200mg PO BID, was informed that's safe in pregnancy    -Recommend eye exam for HCQ monitoring. Referral was made.    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 She was put on ASA+lovenox during IVF. She failed IVF fresh embryo (two) and leftover frozen embryo (one) transfer and was told given her age her best  option would be to use donor embryo and now is on waiting list, hopes to get one in March 2018. She is recommended to use ASA 81 mg qd+lovenox during future pregnancy with donor embryo and I agree given her h/os miscarriage at 12 wk. She ahs no h/o thrombosis. She still does not meet criteria for APS syndrome as miscarriage was still considered 1st trimester miscarriage.    She always had neg NANCY here and no features of SLE, I will re-check NANCY along with APS Abs and lupus serology.     - follow-up in 12 months      MEDICATION CHANGES: None.    Orders Placed This Encounter   Procedures     CBC with platelets differential     AST     ALT     Albumin level     Creatinine     Complement C4     Complement C3     CRP inflammation     Erythrocyte sedimentation rate auto     DNA double stranded antibodies     NANCY by IFA: Laboratory Miscellaneous Order     Cardiolipin Edith IgG and IgM     OPHTHALMOLOGY ADULT REFERRAL             HPI      ROS      Physical Exam

## 2017-10-13 NOTE — LETTER
Patient:  wGendolyn Melgar  :   1975  MRN:     [id number]        Ms.Gwendolyn Melgar  [address] [facility] [address]        2017    Dear ,    We are writing to inform you of your test results. Stable labs. Negative work up for lupus (good news!). As far as anti-phospholipid antibodies: Repeat lupus anticoagulant is negative, beta 2 glycoprotein I IgM is borderline positive and anti-cardiolipin antibody IgM is borderline positive (titer is below 40). I don not think you have anti-phospholipid syndrome (good news) and don't think the previous pregnancy loss was as a result of these antibodies; however it's totally fine to try blood thinner (aspirin and Lovenox) with future pregnancy to increase chance of successful pregnancy.    Resulted Orders   Routine UA with Micro Reflex to Culture   Result Value Ref Range    Color Urine Yellow     Appearance Urine Slightly Cloudy     Glucose Urine Negative NEG^Negative mg/dL    Bilirubin Urine Negative NEG^Negative    Ketones Urine Negative NEG^Negative mg/dL    Specific Gravity Urine 1.015 1.003 - 1.035    Blood Urine Negative NEG^Negative    pH Urine 5.0 5.0 - 7.0 pH    Protein Albumin Urine Negative NEG^Negative mg/dL    Urobilinogen mg/dL 0.0 0.0 - 2.0 mg/dL    Nitrite Urine Negative NEG^Negative    Leukocyte Esterase Urine Small (A) NEG^Negative    Source Midstream Urine     WBC Urine 2 0 - 2 /HPF    RBC Urine 1 0 - 2 /HPF    Bacteria Urine Few (A) NEG^Negative /HPF    Squamous Epithelial /HPF Urine 4 (H) 0 - 1 /HPF    Mucous Urine Present (A) NEG^Negative /LPF   Protein  random urine   Result Value Ref Range    Protein Random Urine 0.18 g/L    Protein Total Urine g/gr Creatinine 0.15 0 - 0.2 g/g Cr   Lupus panel   Result Value Ref Range    Lupus Result Negative NEG^Negative      Comment:      (Note)  COMMENTS:  The INR is normal.  APTT ratio is normal.    DRVVT Screen ratio is normal.  Thrombin time is normal.  NEGATIVE TEST; A LUPUS  ANTICOAGULANT WAS NOT DETECTED IN THIS   SPECIMEN WITHIN THE LIMITS OF THE TESTING REPERTOIRE.  If the clinical picture is strongly suggestive of an antiphospholipid   syndrome, recommend anticardiolipin and beta-2-glycoprotein (IgG and   IgM) antibody tests.  Results interpreted by Lena Ray MD, Hematology, Oncology, and   Transplantation, PGY-6  I personally reviewed the coagulation test results and agree with the   interpretation documented by the resident /fellow and   edited/confirmed by me.  Staci Rojo M.D.  [phone]  10/17/2017                  INR =      0.93        Reference range: 0.86-1.14         Thrombin Time=     14.8        Reference range: 13.0-19.0 sec                    APTT:           Ratio       Patient  =      1.05       1:2 Mix  =      N/A       Reference:              Negative: Less than or equal to 1.16              Positive: Greater than or equal to 1.17                             DILUTE JANES VIPER VENOM TEST:                 Screen Ratio =     0.76       Normal is less than 1.21          Beta 2 Glycoprotein 1 Antibody IgM   Result Value Ref Range    Beta 2 Glycoprotein 1 Antibody IgM 8.9 (H) <7 U/mL      Comment:      Equivocal. It is recommended that samples with equivocal results be redrawn   4-6 weeks later and retested with this assay.     Beta 2 Glycoprotein 1 Antibody IgG   Result Value Ref Range    Beta 2 Glycoprotein 1 Antibody IgG <0.6 <7 U/mL      Comment:      Negative   CBC with platelets differential   Result Value Ref Range    WBC 7.3 4.0 - 11.0 10e9/L    RBC Count 4.13 3.8 - 5.2 10e12/L    Hemoglobin 12.6 11.7 - 15.7 g/dL    Hematocrit 37.5 35.0 - 47.0 %    MCV 91 78 - 100 fl    MCH 30.5 26.5 - 33.0 pg    MCHC 33.6 31.5 - 36.5 g/dL    RDW 11.8 10.0 - 15.0 %    Platelet Count 283 150 - 450 10e9/L    Diff Method Automated Method     % Neutrophils 63.1 %    % Lymphocytes 25.2 %    % Monocytes 9.0 %    % Eosinophils 1.6 %    % Basophils 0.8 %    % Immature  Granulocytes 0.3 %    Nucleated RBCs 0 0 /100    Absolute Neutrophil 4.6 1.6 - 8.3 10e9/L    Absolute Lymphocytes 1.8 0.8 - 5.3 10e9/L    Absolute Monocytes 0.7 0.0 - 1.3 10e9/L    Absolute Eosinophils 0.1 0.0 - 0.7 10e9/L    Absolute Basophils 0.1 0.0 - 0.2 10e9/L    Abs Immature Granulocytes 0.0 0 - 0.4 10e9/L    Absolute Nucleated RBC 0.0    AST   Result Value Ref Range    AST 19 0 - 45 U/L   ALT   Result Value Ref Range    ALT 35 0 - 50 U/L   Albumin level   Result Value Ref Range    Albumin 4.0 3.4 - 5.0 g/dL   Creatinine   Result Value Ref Range    Creatinine 0.91 0.52 - 1.04 mg/dL    GFR Estimate 67 >60 mL/min/1.7m2      Comment:      Non  GFR Calc    GFR Estimate If Black 82 >60 mL/min/1.7m2      Comment:       GFR Calc   Complement C4   Result Value Ref Range    Complement C4 19 15 - 50 mg/dL   Complement C3   Result Value Ref Range    Complement C3 131 76 - 169 mg/dL   CRP inflammation   Result Value Ref Range    CRP Inflammation <2.9 0.0 - 8.0 mg/L   Erythrocyte sedimentation rate auto   Result Value Ref Range    Sed Rate 18 0 - 20 mm/h   DNA double stranded antibodies   Result Value Ref Range    DNA-ds 4 <10 IU/mL      Comment:      Negative   Anti Nuclear Edith IgG by IFA with Reflex   Result Value Ref Range    NANCY interpretation Negative NEG^Negative      Comment:                                         Reference range:  <1:40  NEGATIVE  1:40 - 1:80  BORDERLINE POSITIVE  >1:80 POSITIVE      NANCY titer 1 1:40    Creatinine urine calculation only   Result Value Ref Range    Creatinine Urine 124 mg/dL     Component      Latest Ref Rng & Units 10/13/2017   Cardiolipin Antibody IgG      0.0 - 19.9 GPL-U/mL 3.5   Cardiolipin Antibody IgM      0.0 - 19.9 MPL-U/mL 27.0 (H)       Marie Charles MD

## 2017-10-16 LAB
ANA SER QL IF: NEGATIVE
ANA TITR SER IF: NORMAL {TITER}
B2 GLYCOPROT1 IGG SERPL IA-ACNC: <0.6 U/ML
B2 GLYCOPROT1 IGM SERPL IA-ACNC: 8.9 U/ML
C3 SERPL-MCNC: 131 MG/DL (ref 76–169)
C4 SERPL-MCNC: 19 MG/DL (ref 15–50)
CARDIOLIPIN ANTIBODY IGG: 3.5 GPL-U/ML (ref 0–19.9)
CARDIOLIPIN ANTIBODY IGM: 27 MPL-U/ML (ref 0–19.9)
DSDNA AB SER-ACNC: 4 IU/ML

## 2017-10-17 LAB — LA PPP-IMP: NEGATIVE

## 2017-10-17 NOTE — PROGRESS NOTES
Stable labs. Negative work up for lupus (good news!). As far as anti-phospholipid antibodies: Repeat lupus anticoagulant is negative, beta 2 glycoprotein I IgM is borderline positive and anti-cardiolipin antibody IgM is borderline positive (titer is below 40). I don not think you have anti-phospholipid syndrome (good news) and don't think the previous pregnancy loss was as a result of these antibodies; however it's totally fine to try blood thinner (aspirin and Lovenox) with future pregnancy to increase chance of successful pregnancy.

## 2017-12-03 ENCOUNTER — HEALTH MAINTENANCE LETTER (OUTPATIENT)
Age: 42
End: 2017-12-03

## 2018-01-11 DIAGNOSIS — M06.4 UNDIFFERENTIATED INFLAMMATORY ARTHRITIS (H): ICD-10-CM

## 2018-01-11 RX ORDER — HYDROXYCHLOROQUINE SULFATE 200 MG/1
200 TABLET, FILM COATED ORAL 2 TIMES DAILY
Qty: 180 TABLET | Refills: 0 | Status: SHIPPED | OUTPATIENT
Start: 2018-01-11 | End: 2018-05-04

## 2018-01-11 NOTE — LETTER
Karlo Snow,    Regular eye exams are required while taking Hydroxychloroquine (Plaquenil). These may be yearly or as determined by your eye specialist.    Although vision problems and loss of sight while taking hydroxchloroquine are very rare, notify your doctor if you notice changes in your vision. Visual changes experienced early on the medication or seen early during regular eye exams usually improve after stopping the medication.     Eye exams should be completed by an ophthalmologist who is experienced in monitoring for Hydroxchloroquine toxicity.    Exam may include visual field testing and slit lamp exam or other testing as determined by the ophthalmologist.     We received a refill request from your pharmacy. A copy of your current eye exam was not found in your medical record. Your Hydroxychloroquine prescription has been refilled for one month.  Please request your eye clinic to fax or mail a copy of your eye exam report to our clinic indicating that testing was completed for toxicity screening.        Sincerely,  Rheumatology Staff

## 2018-01-11 NOTE — TELEPHONE ENCOUNTER
hydroxychloroquine (PLAQUENIL)   Last Written Prescription Date:  10/13/17  Last Fill Quantity: 180,   # refills: 0  Last Office Visit : 10/13/17  Future Office visit:  10/12/18  Eye exam past due.  letter sent.    Routing refill request to provider for review/approval because: eye exam past due

## 2018-01-21 DIAGNOSIS — M06.4 UNDIFFERENTIATED INFLAMMATORY ARTHRITIS (H): ICD-10-CM

## 2018-01-22 RX ORDER — HYDROXYCHLOROQUINE SULFATE 200 MG/1
TABLET, FILM COATED ORAL
Qty: 180 TABLET | OUTPATIENT
Start: 2018-01-22

## 2018-01-23 ENCOUNTER — TRANSFERRED RECORDS (OUTPATIENT)
Dept: HEALTH INFORMATION MANAGEMENT | Facility: CLINIC | Age: 43
End: 2018-01-23

## 2018-05-04 DIAGNOSIS — M06.4 UNDIFFERENTIATED INFLAMMATORY ARTHRITIS (H): ICD-10-CM

## 2018-05-04 NOTE — LETTER
Dear Gwendolyn Melgar,    Gwendolyn Melgar  [address] [facility] [address]         Regular eye exams are required while taking hydroxychloroquine (Plaquenil). Eye exams should be completed by an eye specialist who is experienced in monitoring for hydroxychloroquine toxicity (a rare effect of the drug that can damage your eyes and vision).  These may be yearly or as determined by your eye specialist.     Although vision problems and loss of sight while taking hydroxychloroquine are very rare, notify your doctor immediately if you notice changes in your vision. The goal of screening is to detect toxicity before your vision is significantly or noticeably impacted. Failing to get proper screening exams puts you at risk of vision changes which may or may not be reversible.    Per the American Academy of Ophthalmology recommendations (2016), screening tests performed may include a 10-2 visual field test, spectral-domain optical coherence tomography (SD OCT), or other screening tests as determined by the eye specialist, including a multifocal electroretinogram (mfERG) or fundus auto-fluorescence (FAF).    We received a refill request from your pharmacy. A copy of your current eye exam was not found in your medical record. Your hydroxychloroquine prescription has been refilled with a limited supply pending confirmation you have had an eye exam to test for hydroxychloroquine toxicity.      We encourage you to bring this letter to your eye exam to discuss the exams that will be performed during your visit. Please request your eye clinic fax or mail a copy of your eye exam report to our clinic indicating that testing was completed for hydroxychloroquine toxicity screening. The exam notes must specifically comment on the potential for hydroxychloroquine toxicity and outline recommended follow-up.    If you have questions about hydroxychloroquine or the information in this letter, please call the clinic at [phone] or talk to  your provider at your next office visit.                        2    Eye Specialist Letter  Dear Eye Specialist,  To ensure safe use of Plaquenil (hydroxychloroquine), we are requesting your assistance in providing the following information. Please return this form to our clinic via mail or fax, or incorporate this information into your visit summary. Your note must indicate whether or not there is evidence of toxicity from Plaquenil use. For questions regarding this form, please call [phone].  Patient Name:   :             Date of Exam:    The following exams were completed during this visit in accordance with the American Academy of Ophthalmology recommendations (2016):  ? 10-2 automated visual field  ? Spectral-domain optical coherence tomography (SD OCT)  ? Multifocal electroretinogram (mfERG)  ? Fundus autofluorescence (FAF)  ? Other (please specify)  Please select from the following:  ? The findings from the above exams are not suggestive of toxicity from Plaquenil (hydroxychloroquine).  ? The findings from the above exams may suggest toxicity from Plaquenil (hydroxychloroquine).   Please provide additional guidance on whether or not the medication may be continued from your perspective:  Date of next recommended Plaquenil (hydroxychloroquine) screening eye exam:   ? 5 years  ? 1 year  ? 6 months  Other (please specify):   Eye Specialist Name (print):                                                                Date:  Eye Specialist Signature:

## 2018-05-06 RX ORDER — HYDROXYCHLOROQUINE SULFATE 200 MG/1
200 TABLET, FILM COATED ORAL 2 TIMES DAILY
Qty: 180 TABLET | Refills: 0 | Status: SHIPPED | OUTPATIENT
Start: 2018-05-06 | End: 2018-07-13

## 2018-05-06 NOTE — TELEPHONE ENCOUNTER
hydroxychloroquine (PLAQUENIL) 200 MG tablet  Last Written Prescription Date:  1/11/18  Last Fill Quantity: 180,   # refills: 0  Last Office Visit : 10/13/17  Future Office visit:  10/12/18    Eye exam  10/16  Past due. Letter sent         Routing refill request to provider for review/approval because: lv > 6mths,   f/u 10/18

## 2018-05-07 ENCOUNTER — TELEPHONE (OUTPATIENT)
Dept: RHEUMATOLOGY | Facility: CLINIC | Age: 43
End: 2018-05-07

## 2018-05-07 NOTE — TELEPHONE ENCOUNTER
LVM for pt at [phone], requesting callback to Rheum clinic.    PT will need an eye exam for medication monitoring.    Elmira Soriano LPN  Rheumatology

## 2018-05-08 NOTE — TELEPHONE ENCOUNTER
Called [facility] to request they send eye exam, they do not have a release of information for us.  HIM will call pt and request that they sign the form so exam can be faxed to us.    Yolanda Posey RN  Rheumatology Clinic

## 2018-05-08 NOTE — TELEPHONE ENCOUNTER
Health Call Center    Phone Message    May a detailed message be left on voicemail: yes    Reason for Call: Other: Patient states she had her annual eye exam in October 2017 at [facility] with Dr Odell.  Please contact patient on her cell [phone] if you have any other questions or need records.      Action Taken: Message routed to:  [facility] ([facility]): mike

## 2018-05-16 VITALS — BODY MASS INDEX: 29.06 KG/M2 | WEIGHT: 148.81 LBS

## 2018-07-13 DIAGNOSIS — M06.4 UNDIFFERENTIATED INFLAMMATORY ARTHRITIS (H): ICD-10-CM

## 2018-07-16 RX ORDER — HYDROXYCHLOROQUINE SULFATE 200 MG/1
200 TABLET, FILM COATED ORAL 2 TIMES DAILY
Qty: 180 TABLET | Refills: 1 | Status: SHIPPED | OUTPATIENT
Start: 2018-07-16 | End: 2019-01-02

## 2018-07-16 NOTE — TELEPHONE ENCOUNTER
Plaquenil      Last Written Prescription Date:  5-6-18  Last Fill Quantity: 180,   # refills: 0  Last Office Visit: 10-13-17  Future Office visit: 10-12-18  Last Eye Exam:1-23-18    Routing refill request to provider for review/approval because:  Pt has not been seen in the past 6 mo as per protocol.    Kathleen M Doege RN

## 2018-10-02 ENCOUNTER — TRANSFERRED RECORDS (OUTPATIENT)
Dept: HEALTH INFORMATION MANAGEMENT | Facility: CLINIC | Age: 43
End: 2018-10-02

## 2018-10-08 ENCOUNTER — DOCUMENTATION ONLY (OUTPATIENT)
Dept: RHEUMATOLOGY | Facility: CLINIC | Age: 43
End: 2018-10-08

## 2018-10-08 VITALS — BODY MASS INDEX: 29.06 KG/M2 | WEIGHT: 148.81 LBS

## 2018-10-08 NOTE — PROGRESS NOTES
Documentation received of plaquenil eye exam. See flow sheet for results.   Mariela Rucker CMA  10/8/2018 1:46 PM

## 2018-10-12 ENCOUNTER — OFFICE VISIT (OUTPATIENT)
Dept: RHEUMATOLOGY | Facility: CLINIC | Age: 43
End: 2018-10-12
Attending: INTERNAL MEDICINE
Payer: COMMERCIAL

## 2018-10-12 VITALS
SYSTOLIC BLOOD PRESSURE: 120 MMHG | HEART RATE: 65 BPM | TEMPERATURE: 98.1 F | WEIGHT: 140.6 LBS | OXYGEN SATURATION: 98 % | BODY MASS INDEX: 27.46 KG/M2 | DIASTOLIC BLOOD PRESSURE: 77 MMHG

## 2018-10-12 DIAGNOSIS — M19.90 INFLAMMATORY ARTHRITIS: Primary | ICD-10-CM

## 2018-10-12 DIAGNOSIS — M19.90 INFLAMMATORY ARTHRITIS: ICD-10-CM

## 2018-10-12 LAB
ALBUMIN SERPL-MCNC: 3.8 G/DL (ref 3.4–5)
ALT SERPL W P-5'-P-CCNC: 23 U/L (ref 0–50)
AST SERPL W P-5'-P-CCNC: 18 U/L (ref 0–45)
BASOPHILS # BLD AUTO: 0.1 10E9/L (ref 0–0.2)
BASOPHILS NFR BLD AUTO: 0.7 %
CREAT SERPL-MCNC: 0.83 MG/DL (ref 0.52–1.04)
CRP SERPL-MCNC: <2.9 MG/L (ref 0–8)
DIFFERENTIAL METHOD BLD: NORMAL
EOSINOPHIL # BLD AUTO: 0.1 10E9/L (ref 0–0.7)
EOSINOPHIL NFR BLD AUTO: 1.7 %
ERYTHROCYTE [DISTWIDTH] IN BLOOD BY AUTOMATED COUNT: 12.3 % (ref 10–15)
ERYTHROCYTE [SEDIMENTATION RATE] IN BLOOD BY WESTERGREN METHOD: 12 MM/H (ref 0–20)
GFR SERPL CREATININE-BSD FRML MDRD: 75 ML/MIN/1.7M2
HCT VFR BLD AUTO: 36 % (ref 35–47)
HGB BLD-MCNC: 12.1 G/DL (ref 11.7–15.7)
IMM GRANULOCYTES # BLD: 0 10E9/L (ref 0–0.4)
IMM GRANULOCYTES NFR BLD: 0.4 %
LYMPHOCYTES # BLD AUTO: 1.5 10E9/L (ref 0.8–5.3)
LYMPHOCYTES NFR BLD AUTO: 21.9 %
MCH RBC QN AUTO: 30.6 PG (ref 26.5–33)
MCHC RBC AUTO-ENTMCNC: 33.6 G/DL (ref 31.5–36.5)
MCV RBC AUTO: 91 FL (ref 78–100)
MONOCYTES # BLD AUTO: 0.7 10E9/L (ref 0–1.3)
MONOCYTES NFR BLD AUTO: 9.6 %
NEUTROPHILS # BLD AUTO: 4.5 10E9/L (ref 1.6–8.3)
NEUTROPHILS NFR BLD AUTO: 65.7 %
NRBC # BLD AUTO: 0 10*3/UL
NRBC BLD AUTO-RTO: 0 /100
PLATELET # BLD AUTO: 246 10E9/L (ref 150–450)
RBC # BLD AUTO: 3.95 10E12/L (ref 3.8–5.2)
WBC # BLD AUTO: 6.9 10E9/L (ref 4–11)

## 2018-10-12 PROCEDURE — 85652 RBC SED RATE AUTOMATED: CPT | Performed by: INTERNAL MEDICINE

## 2018-10-12 PROCEDURE — 36415 COLL VENOUS BLD VENIPUNCTURE: CPT | Performed by: INTERNAL MEDICINE

## 2018-10-12 PROCEDURE — 86146 BETA-2 GLYCOPROTEIN ANTIBODY: CPT | Performed by: INTERNAL MEDICINE

## 2018-10-12 PROCEDURE — 86140 C-REACTIVE PROTEIN: CPT | Performed by: INTERNAL MEDICINE

## 2018-10-12 PROCEDURE — 85025 COMPLETE CBC W/AUTO DIFF WBC: CPT | Performed by: INTERNAL MEDICINE

## 2018-10-12 PROCEDURE — 00000401 ZZHCL STATISTIC THROMBIN TIME NC: Performed by: INTERNAL MEDICINE

## 2018-10-12 PROCEDURE — 86147 CARDIOLIPIN ANTIBODY EA IG: CPT | Performed by: INTERNAL MEDICINE

## 2018-10-12 PROCEDURE — 00000167 ZZHCL STATISTIC INR NC: Performed by: INTERNAL MEDICINE

## 2018-10-12 PROCEDURE — 84450 TRANSFERASE (AST) (SGOT): CPT | Performed by: INTERNAL MEDICINE

## 2018-10-12 PROCEDURE — 85730 THROMBOPLASTIN TIME PARTIAL: CPT | Performed by: INTERNAL MEDICINE

## 2018-10-12 PROCEDURE — 84460 ALANINE AMINO (ALT) (SGPT): CPT | Performed by: INTERNAL MEDICINE

## 2018-10-12 PROCEDURE — 82040 ASSAY OF SERUM ALBUMIN: CPT | Performed by: INTERNAL MEDICINE

## 2018-10-12 PROCEDURE — 85613 RUSSELL VIPER VENOM DILUTED: CPT | Performed by: INTERNAL MEDICINE

## 2018-10-12 PROCEDURE — G0463 HOSPITAL OUTPT CLINIC VISIT: HCPCS | Mod: ZF

## 2018-10-12 PROCEDURE — 82565 ASSAY OF CREATININE: CPT | Performed by: INTERNAL MEDICINE

## 2018-10-12 PROCEDURE — 86038 ANTINUCLEAR ANTIBODIES: CPT | Performed by: INTERNAL MEDICINE

## 2018-10-12 ASSESSMENT — PAIN SCALES - GENERAL: PAINLEVEL: MODERATE PAIN (4)

## 2018-10-12 NOTE — MR AVS SNAPSHOT
After Visit Summary   10/12/2018    Gwendolyn Melgar    MRN: [id number]           Patient Information     Date Of Birth          1975        Visit Information        Provider Department      10/12/2018 10:30 AM Marie Charles MD [facility] Rheumatology        Today's Diagnoses     Inflammatory arthritis    -  1      Care Instructions    Labs today    Return in a year          Follow-ups after your visit        Follow-up notes from your care team     Return in about 1 year (around 10/12/2019).      Your next 10 appointments already scheduled     Oct 12, 2018 11:45 AM CDT   Lab with [facility]   [facility] ([facility])    [address]  1st Floor  [facility] [id number] [phone]            Oct 11, 2019 10:30 AM CDT   (Arrive by 10:15 AM)   Return Visit with Marie Charles MD   [facility] Rheumatology ([facility])    [address]  [address]  [facility] [address] [phone]              Future tests that were ordered for you today     Open Future Orders        Priority Expected Expires Ordered    Lupus Anticoagulant Panel Routine  4/13/2019 10/12/2018    Anti Nuclear Edith IgG by IFA with Reflex Routine  4/13/2019 10/12/2018    CBC with platelets differential Routine  4/13/2019 10/12/2018    Creatinine Routine  4/13/2019 10/12/2018    AST Routine  4/13/2019 10/12/2018    ALT Routine  4/13/2019 10/12/2018    Albumin level Routine  4/13/2019 10/12/2018    CRP inflammation Routine  4/13/2019 10/12/2018    Erythrocyte sedimentation rate auto Routine  4/13/2019 10/12/2018    Cardiolipin Edith IgG and IgM Routine  4/13/2019 10/12/2018    Cardiolipin Antibody IgA Routine  4/13/2019 10/12/2018            Who to contact     If you have questions or need follow up information about today's clinic visit or your schedule please contact [facility] RHEUMATOLOGY directly at [phone].  Normal or non-critical lab and imaging results will be  communicated to you by [employer], letter or phone within 4 business days after the clinic has received the results. If you do not hear from us within 7 days, please contact the clinic through [employer] or phone. If you have a critical or abnormal lab result, we will notify you by phone as soon as possible.  Submit refill requests through [employer] or call your pharmacy and they will forward the refill request to us. Please allow 3 business days for your refill to be completed.          Additional Information About Your Visit        [employer] Information     [employer] gives you secure access to your electronic health record. If you see a primary care provider, you can also send messages to your care team and make appointments. If you have questions, please call your primary care clinic.  If you do not have a primary care provider, please call [phone] and they will assist you.        Care EveryWhere ID     This is your Care EveryWhere ID. This could be used by other organizations to access your [place] medical records  [id number]        Your Vitals Were     Pulse Temperature Pulse Oximetry BMI (Body Mass Index)          65 98.1  F (36.7  C) (Oral) 98% 27.46 kg/m2         Blood Pressure from Last 3 Encounters:   10/12/18 120/77   10/13/17 136/84   12/16/16 (!) 166/98    Weight from Last 3 Encounters:   10/12/18 63.8 kg (140 lb 9.6 oz)   10/08/18 67.5 kg (148 lb 13 oz)   05/16/18 67.5 kg (148 lb 13 oz)              We Performed the Following     Beta 2 Glycoprotein 1 Antibody IgA     Beta 2 Glycoprotein 1 Antibody IgG     Beta 2 Glycoprotein 1 Antibody IgM        Primary Care Provider    Katy Dodson       No address on file        Equal Access to Services     [facility]: Hadii joel carlson Soedgar, waaxda luqadaha, qaybta kaalmada bebo banks. So [facility] [phone].    ATENCIÓN: Si habla español, tiene a winter disposición servicios gratuitos de asistencia lingüística.  Urielcamilo page [phone].    We comply with applicable federal civil rights laws and [place] laws. We do not discriminate on the basis of race, color, national origin, age, disability, sex, sexual orientation, or gender identity.            Thank you!     Thank you for choosing [facility] RHEUMATOLOGY  for your care. Our goal is always to provide you with excellent care. Hearing back from our patients is one way we can continue to improve our services. Please take a few minutes to complete the written survey that you may receive in the mail after your visit with us. Thank you!             Your Updated Medication List - Protect others around you: Learn how to safely use, store and throw away your medicines at www.disposemymeds.org.          This list is accurate as of 10/12/18 10:55 AM.  Always use your most recent med list.                   Brand Name Dispense Instructions for use Diagnosis    cetirizine 10 MG tablet    zyrTEC     Take 10 mg by mouth as needed for allergies        FLOVENT  MCG/ACT Inhaler   Generic drug:  fluticasone      Inhale 2 Puffs into the lungs two times a day. 4 puffs twice daily in yellow zone        hydroxychloroquine 200 MG tablet    PLAQUENIL    180 tablet    Take 1 tablet (200 mg) by mouth 2 times daily    Undifferentiated inflammatory arthritis (H)       labetalol 200 MG tablet    NORMODYNE     Take 100 mg by mouth 2 times daily        metFORMIN 500 MG tablet    GLUCOPHAGE          norethindrone-ethinyl estradiol-iron 1-20/1-30/1-35 MG-MCG per tablet    ESTROSTEP FE     Take 1 tablet by mouth daily        PRENATAL VITAMINS PO      With iron        VENTOLIN  (90 Base) MCG/ACT inhaler   Generic drug:  albuterol      Inhale 2 Puffs into the lungs four times a day as needed for Wheezing or Shortness of Breath (as needed). Shake before using.        VITAMIN D (CHOLECALCIFEROL) PO      Take 2,000 Units by mouth daily

## 2018-10-12 NOTE — LETTER
Patient:  Gwendolyn Melgar  :   1975  MRN:     [id number]        Ms.Gwendolyn Melgar  [address] [facility] [address]        2018    Dear ,    We are writing to inform you of your test results. Unchanged labs. aCL IgM remained positive which supports use of aspirin and lovenox during future pregnancy.      Results for orders placed or performed in visit on 10/12/18   Anti Nuclear Edith IgG by IFA with Reflex   Result Value Ref Range    NANCY interpretation Negative NEG^Negative   CBC with platelets differential   Result Value Ref Range    WBC 6.9 4.0 - 11.0 10e9/L    RBC Count 3.95 3.8 - 5.2 10e12/L    Hemoglobin 12.1 11.7 - 15.7 g/dL    Hematocrit 36.0 35.0 - 47.0 %    MCV 91 78 - 100 fl    MCH 30.6 26.5 - 33.0 pg    MCHC 33.6 31.5 - 36.5 g/dL    RDW 12.3 10.0 - 15.0 %    Platelet Count 246 150 - 450 10e9/L    Diff Method Automated Method     % Neutrophils 65.7 %    % Lymphocytes 21.9 %    % Monocytes 9.6 %    % Eosinophils 1.7 %    % Basophils 0.7 %    % Immature Granulocytes 0.4 %    Nucleated RBCs 0 0 /100    Absolute Neutrophil 4.5 1.6 - 8.3 10e9/L    Absolute Lymphocytes 1.5 0.8 - 5.3 10e9/L    Absolute Monocytes 0.7 0.0 - 1.3 10e9/L    Absolute Eosinophils 0.1 0.0 - 0.7 10e9/L    Absolute Basophils 0.1 0.0 - 0.2 10e9/L    Abs Immature Granulocytes 0.0 0 - 0.4 10e9/L    Absolute Nucleated RBC 0.0    Creatinine   Result Value Ref Range    Creatinine 0.83 0.52 - 1.04 mg/dL    GFR Estimate 75 >60 mL/min/1.7m2    GFR Estimate If Black >90 >60 mL/min/1.7m2   AST   Result Value Ref Range    AST 18 0 - 45 U/L   ALT   Result Value Ref Range    ALT 23 0 - 50 U/L   Albumin level   Result Value Ref Range    Albumin 3.8 3.4 - 5.0 g/dL   CRP inflammation   Result Value Ref Range    CRP Inflammation <2.9 0.0 - 8.0 mg/L   Erythrocyte sedimentation rate auto   Result Value Ref Range    Sed Rate 12 0 - 20 mm/h   Cardiolipin Edith IgG and IgM   Result Value Ref Range    Cardiolipin Antibody IgG 2.8 0.0 -  19.9 GPL-U/mL    Cardiolipin Antibody IgM 41.1 (H) 0.0 - 19.9 MPL-U/mL   Cardiolipin Antibody IgA   Result Value Ref Range    Cardiolipin Antibody IgA 6.5 0.0 - 19.9 APL U/mL   Lupus Anticoagulant Panel   Result Value Ref Range    Lupus Result Negative NEG^Negative       Marie Charles MD

## 2018-10-12 NOTE — NURSING NOTE
Chief Complaint   Patient presents with     RECHECK     arthritis     /77  Pulse 65  Temp 98.1  F (36.7  C) (Oral)  Wt 63.8 kg (140 lb 9.6 oz)  SpO2 98%  BMI 27.46 kg/m2  Nancy Isaac MA

## 2018-10-12 NOTE — LETTER
Patient:  Gwendolyn Melgar  :   1975  MRN:     [id number]        Ms.Gwendolyn Melgar  [address] [facility] [address]        2018    Dear ,    We are writing to inform you of your test results. Unchanged labs. Beta 2 glycoprotein I IgM remained positive which supports use of aspirin and Lovenox during future pregnancy.      Results for orders placed or performed in visit on 10/12/18   Beta 2 Glycoprotein 1 Antibody IgA   Result Value Ref Range    Beta 2 Glycoprotein 1 Antibody IgA 1.5 <7 U/mL   Beta 2 Glycoprotein 1 Antibody IgG   Result Value Ref Range    Beta 2 Glycoprotein 1 Antibody IgG 0.7 <7 U/mL   Beta 2 Glycoprotein 1 Antibody IgM   Result Value Ref Range    Beta 2 Glycoprotein 1 Antibody IgM 7.9 (H) <7 U/mL       Marie Charles MD

## 2018-10-12 NOTE — LETTER
10/12/2018      RE: Gwendolyn Melgar  [address] [facility] [address]       Rheumatology F/U Note    Date of last visit: 10/13/2017  Today's visit date: 10/12/2018    Reason for visit: Seronegative non-erosive inflammatory arthritis        HPI from initial visit    Gwendolyn Melgar is a 38 yo WF who was referred to our clinic for evaluation and management of her IA.    On 1/30/2006, she woke up with pain/swelling of L 2nd finger. Was treated with prednisone and reportedly L hand MRI showed inflammatory arthritis. She had neg HLA-B27, NANCY, RF and anti-CCP in 2006. She was referred to rheumatology and was put on SSZ (? dose, ?2-3 tab twice a day). She was able to make a full fist and her joint sx improved. She was on that x 6 months and was told to stop it. Her arthritis was under good control x 3 yr.     In 2010, started having low back pain. She had problem with low back pain and getting out of the chair. At that time, had hand AM stiffness in AM to the point that could not open her hands. She was put on lodine. It helped her. Was told to taper the dose from 2 a day to 1 a day.    Last summer, they bought a new house and her father had double lung transplant. She started having low back pain in 10/2013, had severe pain to the point that she could not get off the floor without help. She was prescribed prednisone 20 max over 20 days. It helped her. Had unremarkable L hip x-ray. Never had SI joint x-ray or MRI.    Was recommended to start anti-TNF Tx but she is concerned about side effects and is seeking second opinion.      Reports fatigue. Currently has a cold with dry cough. Has h/o alopecia areata, has thin hair. Has occasional diarrhea sec anxiety, anxiety on lexapro, myalgia, occasional numbness/tingling ta night in hands which wakes her up at night.    Today, has pain around shoulders and hips which could be sec sitting at desc and cold.    Interval hx: Her extensive rheumatologic work up at initial visit was  negative. I diagnosed her with seroneg non-erosive IA and advised her to increase lodine from 1 to 2 a day which she did but could not tolerate it a sit caused GI upset. She had pain/tenderness/swelling of PIP joints with AM stiffness> 1 hr. Prescribed her  mg bid in 3/2014, she reports significant improvement of joint sx on it. Tolerates it well. Was also put her on cymbalta at the same time helped with her mood and back pain. She stopped cymbalta as had pregnancy plan, reports occasional LBP off cymbalta. Had leg cramps at last visit, zanaflex was prescribed but leg cramps resolved on its won and she did not try the zanaflex. R hip bursitis improved after doing PT.    She is feeling well today. Just found out to be pregnant on Maddox's day. She is 6 wk pregnant, is excited about it. Her TG is 10/17/2015. She is due for eye exam in 9/2015. She is still taking the HCQ and is helping, no joint pain/swelling or AM stiffness. Had pain over R foot on walking about 6 wk ago when she was in [place], it eventually resolved on its own.        12/2016: Doing well with no joint pain or arthritis flare. Now is going through IVF, had one round in 11/2016 which was not successful, now is going to do another round in January 2017. She developed R sciatica pain after 1st round of IVF which resolved after working with chiropractor. 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 I don't have her test results, she thinks they were tested only once.      10/2017: No arthritis flare up since last visit, doing well. Has ongoing aching around shoulders and low back which is intermittent, started to see a chiropractor and it's helping. She failed IVF, one fresh embryo (x2 embryos) transfer and one frozen embryo transfer. Now is on waiting list to get donor embryo, is hoping to get one by March 2018.  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 Her OB believes that + APS antibodies might explain the fetal loss at 12 wk when Gwendolyn got pregnant without assistance; however it was never confirmed, no genetic testing was done on fetus.    She reports no recurrence of hand stiffness or pain since she started taking HCQ.    Today: 2wk ago, started to hurt over R shoulder pain and L 2nd finger pain. Otherwise had a good yr. Taking a hot bath helps. Tylenol or ibuprofen helps. zanaflex helps with leg cramps and pain.    Sometimes gets canker sores.    No major fatigue.    Overall doing fairly well with no major arthritis flare up on HCQ, waiting for donor embryo. On 8/3/2018, had surgery for endometriosis, it was converted to open laparotomy, right salpingo-oophorectomy. Appendectomy. Left salpingectomy       ROS:  A comprehensive ROS was done, positives are per HPI.      Interval History (2015)  Pt states that she had a spontaneous  at approximately 12 weeks.  Since that time she has had a recurrence of her bilateral lower hip and back pain that started approximately 1-2 months after the miscarriage occurred.  Morning stiffness duration varies with activity and achiness that lasts for approximately on hour. Pt states that he rpain is currenlty a 1-2/10 at its maximum, 3-4/10 at maximum.  Ice and physical therapy alleviated pain.  She is not taking any analgesics.  Achiness can return at the end of the day.  Pt states taht she feels taht her symptoms are being well controlled on plaquenil and that she is not experiencing any side effects.  She states that she would like to remain on the plaquenil.  She is trying to become pregnant again, so she does not want to initiate any medications that are potentially teratogenic.  She states that she doesn't think that she needs a short  course of prednisone to deal with her current symptoms    Pt states that in February she had an acute attack of right 1st metatarsophalangeal joint pain.  Pt denies callor, edema, or warmth.  Pt states that the pain is severe to the point that she can't walk on it.  Pain is worse than her normal arthritis pain.  Pt does not drink.  No association with seafood, organ meats.  Pt states that she was eating a lot of red meats during that period.     ROS:  Affirms SI joint and trochanteric pain with 1 hour of morning stiffness.  Denies HA, fevers, chills, night sweats, changes in vision, sicca symptoms, ulcers, epistaxis, lumps and bumps in neck, chest pain, palpitations, SOB, stomach pain, n/v/d, constipation, hematochezia, melena, dysuria, hematuria, weakness of muscles, new rashes, raynaud's.    Today: Had neg HLA-B27 and neg pelvic MRI for sacroiliitis. Her SI joint pain resolved. Reports intermittent B/L shoulder pain and R hip bursitis pain but overall her pain much less and she did not have as much pain and problem with her joints this past winter. After having a miscarriage at 11 wk, now has infertility problem. Reports developing ovarian cyst after taking clomiphene, now is on both metformin and OC, will try fertility meds again after resolution of cysts. She reports having diarrhea on metformin but now it's better.    Her limited rheumatology records were reviewed.    This Document is currently in Final Status  Exam Accession# [id number]     Signs and Symptoms for Radiological Exam from [facility]:       * hip pain  pt states left hip pain no injury  [id number]  History from IDX:       * ARTHROPATHY, UNSPECIFIED, SITE UNSPECIFIED; PAIN IN JOINT, PELVIC REGION AND THIGH  AP PELVIS AND LATERAL VIEW LEFT HIP 11/05/2012     COMPARISON: AP pelvis 05/11/2006.     FINDINGS: The pelvic ring is intact. Normal alignment of the left hip.  The joint spaces are symmetric. Tiny accessory ossicle at the lateral  aspect of the left  acetabular roof. This is stable from the previous  study.     tt        Examination Interpreted at: [facility],   [place], [place]  The Interpreting Physician is FERNANDO DAVIS  Exam was completed at [facility]  Component      Latest Ref Rng 1/23/2014 1/23/2014          12:00 AM 12:00 AM   Sodium      137 - 145 mmol/L 137    Potassium      3.6 - 5.0 mmol/L 4.4    Chloride      98 - 107 mmol/L 99    CARBON DIOXIDE, TOTAL      22 - 35 mmol/L 26    Anion Gap       12    Glucose      65 - 100 mg/dL 100    Urea Nitrogen      7 - 20 mg/dL 14    Creatinine      0.5 - 1.0 mg/dL 0.7    Calcium      8.4 - 10.2 mg/dL 9.3    Protein Total      6.3 - 8.2 g/dL 7.1    Albumin      3.5 - 5.0 g/dL 4.2    Bilirubin Total      0.2 - 1.3 mg/dL 0.5    Alkaline Phosphatase      38 - 126 U/L 47    AST      14 - 59 U/L 26    ALT      9 - 72 U/L 28    RNP Antibodies      0.0 - 0.9 AI <0.2 <0.2   Kevin Antibodies      0.0 - 0.9 AI <0.2 <0.2   Scleroderma Antibody Scl-70 ANA CRISTINA IgG      0.0 - 0.9 AI  <0.2   Sjogren's Anti-SS-A      0.0 - 0.9 AI  <0.2   Sjogren's Anti-SS-B      0.0 - 0.9 AI  <0.2   Antichromatin Antibodies      0.0 - 0.9 AI  <0.2   Anti-Laura-1      0.0 - 0.9 Al  <0.2   Centomere B Atb      0.0 - 0.9 AI  <0.2   QuantiFERON TB Gold       Neg    QuantiFERON TB Ag Value       0.05    QuantiFERON Nil Value       0.05    QuantiFERON Mitogen Value       >10.00    QFT TB Ag minus Nil Value       0.00    NANCY Screen by EIA       Neg    Hepatitis C PANKAJ      0.0 - 0.9 0.1    Vitamin D,25-Hydroxy      30.0 - 100.0 ng/mL 34.8    HBsAg Screen       Neg    Hepatitis B Core Antibody       Neg    Complement C4      9 - 36 16    Complement C3      90 - 180 153    Rheumatoid Factor      0.0 - 13.9 KIU/L 9.8    C-Reactive Protein      0.0 - 4.9 mg/dL 2.7    Anti-DNA (DS) Ab Qn      0 - 9 IU/mL 9    CCP Antibodies      0 - 19 U/mL 3      Exam: Bilateral wrists, 3 views each. 1/10/2014.    Comparison: None.    Clinical  history: Arthropathy.    Findings: 3 views each of the bilateral wrists were obtained. Joint  spaces are well-maintained. No erosive changes are noted. No soft  tissue abnormalities are seen.            Result Impression       Impression: No erosive changes in the bilateral wrists.    AVELINA HIGGINBOTHAM MD  I have personally reviewed the image and initial interpretation, and I  agree with findings.     Exam: Bilateral hands, 3 views each. 1/10/2014.    Comparison: None.    Clinical history: Arthropathy.    Findings: 3 views each of the bilateral hands were obtained. The joint  spaces are well-maintained. No soft tissue abnormalities are noted. No  erosive changes are seen.            Result Impression       Impression: No erosive changes in the bilateral hands.    AVELINA HIGGINBOTHAM MD  I have personally reviewed the image and initial interpretation, and I  agree with findings.     Exam: Sacroiliac joints, 3 views. 1/10/2014.    Comparison: None.    Clinical history: Arthropathy.    Findings: 3 views of the sacroiliac joints were obtained. The  sacroiliac joints are patent. No abnormal sclerosis is noted. No  definite erosive changes are seen.            Result Impression       Impression: No acute bone abnormality in the sacroiliac joints.    AVELINA HIGGINBOTHAM MD  I have personally reviewed the image and initial interpretation, and I  agree with findings.          Component      Latest Ref Rng 2/20/2015   WBC      4.0 - 11.0 10e9/L 11.1 (H)   RBC Count      3.8 - 5.2 10e12/L 4.28   Hemoglobin      11.7 - 15.7 g/dL 13.1   Hematocrit      35.0 - 47.0 % 38.9   MCV      78 - 100 fl 91   MCH      26.5 - 33.0 pg 30.6   MCHC      31.5 - 36.5 g/dL 33.7   RDW      10.0 - 15.0 % 13.1   Platelet Count      150 - 450 10e9/L 238   Diff Method       Automated Method   % Neutrophils       66.4   % Lymphocytes       21.7   % Monocytes       9.6   % Eosinophils       1.5   % Basophils       0.4   % Immature Granulocytes       0.4    Absolute Neutrophil      1.6 - 8.3 10e9/L 7.4   Absolute Lymphocytes      0.8 - 5.3 10e9/L 2.4   Absolute Monoctyes      0.0 - 1.3 10e9/L 1.1   Absolute Eosinophils      0.0 - 0.7 10e9/L 0.2   Absolute Basophils      0.0 - 0.2 10e9/L 0.0   Abs Immature Granulocytes      0 - 0.4 10e9/L 0.0   Creatinine      0.52 - 1.04 mg/dL 0.72   GFR Estimate      >60 mL/min/1.7m2 90   GFR Estimate If Black      >60 mL/min/1.7m2 >90 . . .   CRP Inflammation      0.0 - 8.0 mg/L 3.5   Sed Rate      0 - 20 mm/h 9   Albumin      3.4 - 5.0 g/dL 3.8   ALT      0 - 50 U/L 67 (H)   AST      0 - 45 U/L 28     Component      Latest Ref [facility] 8/21/2015   WBC      4.0 - 11.0 10e9/L 7.9   RBC Count      3.8 - 5.2 10e12/L 4.50   Hemoglobin      11.7 - 15.7 g/dL 13.8   Hematocrit      35.0 - 47.0 % 40.0   MCV      78 - 100 fl 89   MCH      26.5 - 33.0 pg 30.7   MCHC      31.5 - 36.5 g/dL 34.5   RDW      10.0 - 15.0 % 12.6   Platelet Count      150 - 450 10e9/L 273   Diff Method       Automated Method   % Neutrophils       66.4   % Lymphocytes       23.6   % Monocytes       8.0   % Eosinophils       1.3   % Basophils       0.3   % Immature Granulocytes       0.4   Absolute Neutrophil      1.6 - 8.3 10e9/L 5.3   Absolute Lymphocytes      0.8 - 5.3 10e9/L 1.9   Absolute Monoctyes      0.0 - 1.3 10e9/L 0.6   Absolute Eosinophils      0.0 - 0.7 10e9/L 0.1   Absolute Basophils      0.0 - 0.2 10e9/L 0.0   Abs Immature Granulocytes      0 - 0.4 10e9/L 0.0   Creatinine      0.52 - 1.04 mg/dL 0.78   GFR Estimate      >60 mL/min/1.7m2 81   GFR Estimate If Black      >60 mL/min/1.7m2 >90 . . .   [id number] Method       SSOP   B locus       B27 Neg   CRP Inflammation      0.0 - 8.0 mg/L <2.9   Sed Rate      0 - 20 mm/h 11   Albumin      3.4 - 5.0 g/dL 4.2   ALT      0 - 50 U/L 50   AST      0 - 45 U/L 29   HLA B27 Typing       Specimen received - Immunology report to follow upon completion.   Exam: MRI of the sacroiliac joints dated 10/21/2015.  Comparison:  1/10/2014.  Clinical history: Evaluate for sacroiliitis.  Technique: Multiplanar, multisequence MR imaging of the sacroiliac  joints were obtained using standard sequences in 2 orthogonal planes  before and after the uneventful administration of intravenous  gadolinium contrast.  Findings:  No significant fluid in the sacroiliac joints. No abnormal enhancement  to suggest sacroiliitis. No erosive changes are noted.  The muscle bulk is intact without significant atrophy or edema. The  visualized intrapelvic structures are unremarkable. No  lymphadenopathy. No full-thickness tear or tendon retraction.  No abnormal marrow signal to suggest fracture, osteonecrosis, or  marrow infiltration. Nonspecific subtle focus of T2 hyperintensity  within the left iliac bone, coronal series 3 image 10, likely  vascularity.  Large field-of-view limits evaluation the hip joints. No  full-thickness cartilage loss or joint effusion. The visualized lower  lumbar spine is unremarkable.  IMPRESSION  Impression:  1. No findings to suggest sacroiliitis.  2. No abnormal marrow signal to suggest fracture, osteonecrosis, or  marrow infiltration.  AVELINA HIGGINBOTHAM MD  Component      Latest Ref Rng & Units 12/16/2016   Cardiolipin Antibody IgG      0.0 - 19.9 GPL-U/mL 3.2   Cardiolipin Antibody IgM      0.0 - 19.9 MPL-U/mL 27.7 (H)   Cardiolipin Antibody IgA      0.0 - 19.9 APL U/mL 5.7   Beta 2 Glycoprotein 1 Antibody IgA      <7 U/mL 1.6     Component      Latest Ref Rng & Units 10/13/2017   WBC      4.0 - 11.0 10e9/L 7.3   RBC Count      3.8 - 5.2 10e12/L 4.13   Hemoglobin      11.7 - 15.7 g/dL 12.6   Hematocrit      35.0 - 47.0 % 37.5   MCV      78 - 100 fl 91   MCH      26.5 - 33.0 pg 30.5   MCHC      31.5 - 36.5 g/dL 33.6   RDW      10.0 - 15.0 % 11.8   Platelet Count      150 - 450 10e9/L 283   Diff Method       Automated Method   % Neutrophils      % 63.1   % Lymphocytes      % 25.2   % Monocytes      % 9.0   % Eosinophils      % 1.6   %  Basophils      % 0.8   % Immature Granulocytes      % 0.3   Nucleated RBCs      0 /100 0   Absolute Neutrophil      1.6 - 8.3 10e9/L 4.6   Absolute Lymphocytes      0.8 - 5.3 10e9/L 1.8   Absolute Monocytes      0.0 - 1.3 10e9/L 0.7   Absolute Eosinophils      0.0 - 0.7 10e9/L 0.1   Absolute Basophils      0.0 - 0.2 10e9/L 0.1   Abs Immature Granulocytes      0 - 0.4 10e9/L 0.0   Absolute Nucleated RBC       0.0   Color Urine       Yellow   Appearance Urine       Slightly Cloudy   Glucose Urine      NEG:Negative mg/dL Negative   Bilirubin Urine      NEG:Negative Negative   Ketones Urine      NEG:Negative mg/dL Negative   Specific Gravity Urine      1.003 - 1.035 1.015   Blood Urine      NEG:Negative Negative   pH Urine      5.0 - 7.0 pH 5.0   Protein Albumin Urine      NEG:Negative mg/dL Negative   Urobilinogen mg/dL      0.0 - 2.0 mg/dL 0.0   Nitrite Urine      NEG:Negative Negative   Leukocyte Esterase Urine      NEG:Negative Small (A)   Source       Midstream Urine   WBC Urine      0 - 2 /HPF 2   RBC Urine      0 - 2 /HPF 1   Bacteria Urine      NEG:Negative /HPF Few (A)   Squamous Epithelial /HPF Urine      0 - 1 /HPF 4 (H)   Mucous Urine      NEG:Negative /LPF Present (A)   Creatinine      0.52 - 1.04 mg/dL 0.91   GFR Estimate      >60 mL/min/1.7m2 67   GFR Estimate If Black      >60 mL/min/1.7m2 82   Cardiolipin Antibody IgG      0.0 - 19.9 GPL-U/mL 3.5   Cardiolipin Antibody IgM      0.0 - 19.9 MPL-U/mL 27.0 (H)   Protein Random Urine      g/L 0.18   Protein Total Urine g/gr Creatinine      0 - 0.2 g/g Cr 0.15   NANCY interpretation      NEG:Negative Negative   NANCY titer 1       1:40   Lupus Result      NEG:Negative Negative   Beta 2 Glycoprotein 1 Antibody IgM      <7 U/mL 8.9 (H)   Beta 2 Glycoprotein 1 Antibody IgG      <7 U/mL <0.6   AST      0 - 45 U/L 19   ALT      0 - 50 U/L 35   Albumin      3.4 - 5.0 g/dL 4.0   Complement C4      15 - 50 mg/dL 19   Complement C3      76 - 169 mg/dL 131   CRP  Inflammation      0.0 - 8.0 mg/L <2.9   Sed Rate      0 - 20 mm/h 18   DNA-ds      <10 IU/mL 4   Creatinine Urine      mg/dL 124     HISTORY REVIEW:  Past Medical History:   Diagnosis Date     Asthma      Hypertension      Inflammatory arthritis      Past Surgical History:   Procedure Laterality Date     CHOLECYSTECTOMY       GALLBLADDER SURGERY       TONSILLECTOMY       Family History   Problem Relation Age of Onset     Lupus Paternal Aunt      father had double lung transplant for alpha 1 anti-trypsin def     Arthritis Maternal Grandmother      RA     Hypertension Maternal Grandmother      Depression Maternal Grandmother      Arthritis Paternal Grandfather      RA     Family History Negative Other      neg for AS, psoriasis     GASTROINTESTINAL DISEASE Other      cousin has colitis     Diabetes Father      due to transplant     Allergies Father      Diabetes Other      Aunts on both sides     Hypertension Mother      Allergies Mother      Hypertension Maternal Grandfather      Arthritis Maternal Grandfather      Arthritis Paternal Grandmother      Other - See Comments Other      fibromyalgia - paternal aunt     Social History     Social History     Marital status:      Spouse name: N/A     Number of children: 0     Years of education: N/A     Occupational History      [employer]     Social History Main Topics     Smoking status: Never Smoker     Smokeless tobacco: Never Used     Alcohol use Yes      Comment: occ     Drug use: No     Sexual activity: Not on file     Other Topics Concern     Not on file     Social History Narrative       PMHx, FHx, SHx were reviewed, unchanged.    Pregnancy Hx: She is  s/p one miscarriage around 12 wk    Outpatient Encounter Prescriptions as of 10/12/2018   Medication Sig Dispense Refill     albuterol (VENTOLIN HFA) 108 (90 BASE) MCG/ACT inhaler Inhale 2 Puffs into the lungs four times a day as needed for Wheezing or Shortness of Breath (as needed). Shake before  using.       cetirizine (ZYRTEC) 10 MG tablet Take 10 mg by mouth as needed for allergies       fluticasone (FLOVENT HFA) 110 MCG/ACT inhaler Inhale 2 Puffs into the lungs two times a day. 4 puffs twice daily in yellow zone       hydroxychloroquine (PLAQUENIL) 200 MG tablet Take 1 tablet (200 mg) by mouth 2 times daily 180 tablet 1     labetalol (NORMODYNE) 200 MG tablet Take 100 mg by mouth 2 times daily       metFORMIN (GLUCOPHAGE) 500 MG tablet        norethindrone-ethinyl estradiol-iron (ESTROSTEP FE) 1-20/1-30/1-35 MG-MCG per tablet Take 1 tablet by mouth daily       Prenatal Multivit-Min-Fe-FA (PRENATAL VITAMINS PO) With iron       VITAMIN D, CHOLECALCIFEROL, PO Take 2,000 Units by mouth daily       No facility-administered encounter medications on file as of 10/12/2018.      Allergies   Allergen Reactions     Tetanus Immune Globulin      Fever     Tetanus Toxoid            Ph.E:    Vitals:    10/12/18 1024   BP: 120/77   Pulse: 65   Temp: 98.1  F (36.7  C)   TempSrc: Oral   SpO2: 98%   Weight: 63.8 kg (140 lb 9.6 oz)       Constitutional: WD/WN. Pleasant. In no acute distress.   Eyes: EOM intact, PERRLA, sclera anicteric, conj not injected  HEENT: No oral ulcers or thrush. Normal salivary pool.  Neck: No cervical LAP  Chest: Clear to auscultation bilaterally  CV: RRR, no murmurs/ rubs or gallops. No edema, clubbing or cyanosis.   GI: Abdomen is soft and non tender.     MS: no joint tenderness. No active synovitis. No joint deformities.  No nodules. +FM TP  Skin: No skin rash, malar rash, livedo, periungual erythema,digital ulcers or nail changes. + thinning of hair (allopecia).  Neuro: A&O x 3. 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 Received MRI report of L index finger 4/06  which showed L  index finger edema from PIP to DIP (non specific finding). Her work up at initial visit in 1/2014 was suggestive of seroneg non-erosive IA. She was recommended to increase lodine to 2 tab a day but could not tolerate it sec GI upset. Has FM TP but FMS can't explain all her pain including pain/tenderness/swelling over PIP joints, AM stiffness> 1hr and good response to steroid/SSZ in the past. For AI, recommended a trial of HCQ. She is on HCQ since 3/2014 with excellent response. Her IA is under excellent control on HCQ.    -neg HLA-B27 and neg pelvic MRI 10/2015 for sacroiliitis, SI joint pain resolved. Now has intermittent low back and shoulder pain, which is most likely due to FMS, seeing a chiropractor helps.    - continue plaquenil 200mg PO BID, was informed that's safe in pregnancy    -Recommend eye exam for HCQ monitoring. Eye exam is updated.    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 She was put on ASA+lovenox during IVF. She failed IVF fresh embryo (two) and leftover frozen embryo (one) transfer and was told given her age her best option would be to use donor embryo and now is on waiting list, hopes to get one soon. She is recommended to use ASA 81 mg qd+lovenox during future pregnancy with donor embryo and I agree given her h/os miscarriage at 12 wk. She has no h/o thrombosis. She still does not meet criteria for APS syndrome as miscarriage was still considered 1st trimester miscarriage.    She always had neg NANCY here and no features of SLE, I will re-check NANCY along with repeat APS Abs.     - follow-up in 12 months      MEDICATION CHANGES: None.    Orders Placed This Encounter   Procedures     Anti Nuclear Edith IgG by IFA with Reflex     CBC with platelets differential     Creatinine     AST      ALT     Albumin level     CRP inflammation     Erythrocyte sedimentation rate auto     Cardiolipin Edith IgG and IgM     Cardiolipin Antibody IgA     Lupus Anticoagulant Panel     Beta 2 Glycoprotein 1 Antibody IgA     Beta 2 Glycoprotein 1 Antibody IgG     Beta 2 Glycoprotein 1 Antibody IgM             HPI      ROS      Physical Exam        Marie Charles MD

## 2018-10-12 NOTE — PROGRESS NOTES
Rheumatology F/U Note    Date of last visit: 10/13/2017  Today's visit date: 10/12/2018    Reason for visit: Seronegative non-erosive inflammatory arthritis        HPI from initial visit    Gwendolyn Melgar is a 38 yo WF who was referred to our clinic for evaluation and management of her IA.    On 1/30/2006, she woke up with pain/swelling of L 2nd finger. Was treated with prednisone and reportedly L hand MRI showed inflammatory arthritis. She had neg HLA-B27, NANCY, RF and anti-CCP in 2006. She was referred to rheumatology and was put on SSZ (? dose, ?2-3 tab twice a day). She was able to make a full fist and her joint sx improved. She was on that x 6 months and was told to stop it. Her arthritis was under good control x 3 yr.     In 2010, started having low back pain. She had problem with low back pain and getting out of the chair. At that time, had hand AM stiffness in AM to the point that could not open her hands. She was put on lodine. It helped her. Was told to taper the dose from 2 a day to 1 a day.    Last summer, they bought a new house and her father had double lung transplant. She started having low back pain in 10/2013, had severe pain to the point that she could not get off the floor without help. She was prescribed prednisone 20 max over 20 days. It helped her. Had unremarkable L hip x-ray. Never had SI joint x-ray or MRI.    Was recommended to start anti-TNF Tx but she is concerned about side effects and is seeking second opinion.      Reports fatigue. Currently has a cold with dry cough. Has h/o alopecia areata, has thin hair. Has occasional diarrhea sec anxiety, anxiety on lexapro, myalgia, occasional numbness/tingling ta night in hands which wakes her up at night.    Today, has pain around shoulders and hips which could be sec sitting at desc and cold.    Interval hx: Her extensive rheumatologic work up at initial visit was negative. I diagnosed her with seroneg non-erosive IA and advised her to increase  lodine from 1 to 2 a day which she did but could not tolerate it a sit caused GI upset. She had pain/tenderness/swelling of PIP joints with AM stiffness> 1 hr. Prescribed her  mg bid in 3/2014, she reports significant improvement of joint sx on it. Tolerates it well. Was also put her on cymbalta at the same time helped with her mood and back pain. She stopped cymbalta as had pregnancy plan, reports occasional LBP off cymbalta. Had leg cramps at last visit, zanaflex was prescribed but leg cramps resolved on its won and she did not try the zanaflex. R hip bursitis improved after doing PT.    She is feeling well today. Just found out to be pregnant on Maddox's day. She is 6 wk pregnant, is excited about it. Her TG is 10/17/2015. She is due for eye exam in 9/2015. She is still taking the HCQ and is helping, no joint pain/swelling or AM stiffness. Had pain over R foot on walking about 6 wk ago when she was in [place], it eventually resolved on its own.        12/2016: Doing well with no joint pain or arthritis flare. Now is going through IVF, had one round in 11/2016 which was not successful, now is going to do another round in January 2017. She developed R sciatica pain after 1st round of IVF which resolved after working with chiropractor. 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 I don't have her test results, she thinks they were tested only once.      10/2017: No arthritis flare up since last visit, doing well. Has ongoing aching around shoulders and low back which is intermittent, started to see a chiropractor and it's helping. She failed IVF, one fresh embryo (x2 embryos) transfer and one frozen embryo transfer. Now is on waiting list to get donor embryo, is hoping to get one by March 2018. 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 Her OB believes that + APS antibodies might explain the fetal loss at 12 wk when Gwendolyn got pregnant without assistance; however it was never confirmed, no genetic testing was done on fetus.    She reports no recurrence of hand stiffness or pain since she started taking HCQ.    Today: 2wk ago, started to hurt over R shoulder pain and L 2nd finger pain. Otherwise had a good yr. Taking a hot bath helps. Tylenol or ibuprofen helps. zanaflex helps with leg cramps and pain.    Sometimes gets canker sores.    No major fatigue.    Overall doing fairly well with no major arthritis flare up on HCQ, waiting for donor embryo. On 8/3/2018, had surgery for endometriosis, it was converted to open laparotomy, right salpingo-oophorectomy. Appendectomy. Left salpingectomy       ROS:  A comprehensive ROS was done, positives are per HPI.      Interval History (2015)  Pt states that she had a spontaneous  at approximately 12 weeks.  Since that time she has had a recurrence of her bilateral lower hip and back pain that started approximately 1-2 months after the miscarriage occurred.  Morning stiffness duration varies with activity and achiness that lasts for approximately on hour. Pt states that he rpain is currenlty a 1-2/10 at its maximum, 3-4/10 at maximum.  Ice and physical therapy alleviated pain.  She is not taking any analgesics.  Achiness can return at the end of the day.  Pt states taht she feels taht her symptoms are being well controlled on plaquenil and that she is not experiencing any side effects.  She states that she would like to remain on the plaquenil.  She is trying to become pregnant again, so she does not want to initiate any medications that are potentially teratogenic.  She states that she doesn't think that she needs a short course of prednisone to deal with her current symptoms    Pt states that in February  she had an acute attack of right 1st metatarsophalangeal joint pain.  Pt denies callor, edema, or warmth.  Pt states that the pain is severe to the point that she can't walk on it.  Pain is worse than her normal arthritis pain.  Pt does not drink.  No association with seafood, organ meats.  Pt states that she was eating a lot of red meats during that period.     ROS:  Affirms SI joint and trochanteric pain with 1 hour of morning stiffness.  Denies HA, fevers, chills, night sweats, changes in vision, sicca symptoms, ulcers, epistaxis, lumps and bumps in neck, chest pain, palpitations, SOB, stomach pain, n/v/d, constipation, hematochezia, melena, dysuria, hematuria, weakness of muscles, new rashes, raynaud's.    Today: Had neg HLA-B27 and neg pelvic MRI for sacroiliitis. Her SI joint pain resolved. Reports intermittent B/L shoulder pain and R hip bursitis pain but overall her pain much less and she did not have as much pain and problem with her joints this past winter. After having a miscarriage at 11 wk, now has infertility problem. Reports developing ovarian cyst after taking clomiphene, now is on both metformin and OC, will try fertility meds again after resolution of cysts. She reports having diarrhea on metformin but now it's better.    Her limited rheumatology records were reviewed.    This Document is currently in Final Status  Exam Accession# [id number]     Signs and Symptoms for Radiological Exam from [facility]:       * hip pain  pt states left hip pain no injury  [id number]  History from IDX:       * ARTHROPATHY, UNSPECIFIED, SITE UNSPECIFIED; PAIN IN JOINT, PELVIC REGION AND THIGH  AP PELVIS AND LATERAL VIEW LEFT HIP 11/05/2012     COMPARISON: AP pelvis 05/11/2006.     FINDINGS: The pelvic ring is intact. Normal alignment of the left hip.  The joint spaces are symmetric. Tiny accessory ossicle at the lateral  aspect of the left acetabular roof. This is stable from the previous  study.     tt        Examination  Interpreted at: [facility],   [place], [place]  The Interpreting Physician is FERNANDO DAVIS  Exam was completed at [facility]  Component      Latest Ref Rng 1/23/2014 1/23/2014          12:00 AM 12:00 AM   Sodium      137 - 145 mmol/L 137    Potassium      3.6 - 5.0 mmol/L 4.4    Chloride      98 - 107 mmol/L 99    CARBON DIOXIDE, TOTAL      22 - 35 mmol/L 26    Anion Gap       12    Glucose      65 - 100 mg/dL 100    Urea Nitrogen      7 - 20 mg/dL 14    Creatinine      0.5 - 1.0 mg/dL 0.7    Calcium      8.4 - 10.2 mg/dL 9.3    Protein Total      6.3 - 8.2 g/dL 7.1    Albumin      3.5 - 5.0 g/dL 4.2    Bilirubin Total      0.2 - 1.3 mg/dL 0.5    Alkaline Phosphatase      38 - 126 U/L 47    AST      14 - 59 U/L 26    ALT      9 - 72 U/L 28    RNP Antibodies      0.0 - 0.9 AI <0.2 <0.2   Kevin Antibodies      0.0 - 0.9 AI <0.2 <0.2   Scleroderma Antibody Scl-70 ANA CRISTINA IgG      0.0 - 0.9 AI  <0.2   Sjogren's Anti-SS-A      0.0 - 0.9 AI  <0.2   Sjogren's Anti-SS-B      0.0 - 0.9 AI  <0.2   Antichromatin Antibodies      0.0 - 0.9 AI  <0.2   Anti-Laura-1      0.0 - 0.9 Al  <0.2   Centomere B Atb      0.0 - 0.9 AI  <0.2   QuantiFERON TB Gold       Neg    QuantiFERON TB Ag Value       0.05    QuantiFERON Nil Value       0.05    QuantiFERON Mitogen Value       >10.00    QFT TB Ag minus Nil Value       0.00    NANCY Screen by EIA       Neg    Hepatitis C PANKAJ      0.0 - 0.9 0.1    Vitamin D,25-Hydroxy      30.0 - 100.0 ng/mL 34.8    HBsAg Screen       Neg    Hepatitis B Core Antibody       Neg    Complement C4      9 - 36 16    Complement C3      90 - 180 153    Rheumatoid Factor      0.0 - 13.9 KIU/L 9.8    C-Reactive Protein      0.0 - 4.9 mg/dL 2.7    Anti-DNA (DS) Ab Qn      0 - 9 IU/mL 9    CCP Antibodies      0 - 19 U/mL 3      Exam: Bilateral wrists, 3 views each. 1/10/2014.    Comparison: None.    Clinical history: Arthropathy.    Findings: 3 views each of the bilateral wrists were obtained.  Joint  spaces are well-maintained. No erosive changes are noted. No soft  tissue abnormalities are seen.            Result Impression       Impression: No erosive changes in the bilateral wrists.    AVELINA HIGGINBOTHAM MD  I have personally reviewed the image and initial interpretation, and I  agree with findings.     Exam: Bilateral hands, 3 views each. 1/10/2014.    Comparison: None.    Clinical history: Arthropathy.    Findings: 3 views each of the bilateral hands were obtained. The joint  spaces are well-maintained. No soft tissue abnormalities are noted. No  erosive changes are seen.            Result Impression       Impression: No erosive changes in the bilateral hands.    AVELINA HIGGINBOTHAM MD  I have personally reviewed the image and initial interpretation, and I  agree with findings.     Exam: Sacroiliac joints, 3 views. 1/10/2014.    Comparison: None.    Clinical history: Arthropathy.    Findings: 3 views of the sacroiliac joints were obtained. The  sacroiliac joints are patent. No abnormal sclerosis is noted. No  definite erosive changes are seen.            Result Impression       Impression: No acute bone abnormality in the sacroiliac joints.    AVELINA HIGGINBOTHAM MD  I have personally reviewed the image and initial interpretation, and I  agree with findings.          Component      Latest Ref Rng 2/20/2015   WBC      4.0 - 11.0 10e9/L 11.1 (H)   RBC Count      3.8 - 5.2 10e12/L 4.28   Hemoglobin      11.7 - 15.7 g/dL 13.1   Hematocrit      35.0 - 47.0 % 38.9   MCV      78 - 100 fl 91   MCH      26.5 - 33.0 pg 30.6   MCHC      31.5 - 36.5 g/dL 33.7   RDW      10.0 - 15.0 % 13.1   Platelet Count      150 - 450 10e9/L 238   Diff Method       Automated Method   % Neutrophils       66.4   % Lymphocytes       21.7   % Monocytes       9.6   % Eosinophils       1.5   % Basophils       0.4   % Immature Granulocytes       0.4   Absolute Neutrophil      1.6 - 8.3 10e9/L 7.4   Absolute Lymphocytes      0.8 - 5.3 10e9/L 2.4    Absolute Monoctyes      0.0 - 1.3 10e9/L 1.1   Absolute Eosinophils      0.0 - 0.7 10e9/L 0.2   Absolute Basophils      0.0 - 0.2 10e9/L 0.0   Abs Immature Granulocytes      0 - 0.4 10e9/L 0.0   Creatinine      0.52 - 1.04 mg/dL 0.72   GFR Estimate      >60 mL/min/1.7m2 90   GFR Estimate If Black      >60 mL/min/1.7m2 >90 . . .   CRP Inflammation      0.0 - 8.0 mg/L 3.5   Sed Rate      0 - 20 mm/h 9   Albumin      3.4 - 5.0 g/dL 3.8   ALT      0 - 50 U/L 67 (H)   AST      0 - 45 U/L 28     Component      Latest Ref Rn 8/21/2015   WBC      4.0 - 11.0 10e9/L 7.9   RBC Count      3.8 - 5.2 10e12/L 4.50   Hemoglobin      11.7 - 15.7 g/dL 13.8   Hematocrit      35.0 - 47.0 % 40.0   MCV      78 - 100 fl 89   MCH      26.5 - 33.0 pg 30.7   MCHC      31.5 - 36.5 g/dL 34.5   RDW      10.0 - 15.0 % 12.6   Platelet Count      150 - 450 10e9/L 273   Diff Method       Automated Method   % Neutrophils       66.4   % Lymphocytes       23.6   % Monocytes       8.0   % Eosinophils       1.3   % Basophils       0.3   % Immature Granulocytes       0.4   Absolute Neutrophil      1.6 - 8.3 10e9/L 5.3   Absolute Lymphocytes      0.8 - 5.3 10e9/L 1.9   Absolute Monoctyes      0.0 - 1.3 10e9/L 0.6   Absolute Eosinophils      0.0 - 0.7 10e9/L 0.1   Absolute Basophils      0.0 - 0.2 10e9/L 0.0   Abs Immature Granulocytes      0 - 0.4 10e9/L 0.0   Creatinine      0.52 - 1.04 mg/dL 0.78   GFR Estimate      >60 mL/min/1.7m2 81   GFR Estimate If Black      >60 mL/min/1.7m2 >90 . . .   [id number] Method       SSOP   B locus       B27 Neg   CRP Inflammation      0.0 - 8.0 mg/L <2.9   Sed Rate      0 - 20 mm/h 11   Albumin      3.4 - 5.0 g/dL 4.2   ALT      0 - 50 U/L 50   AST      0 - 45 U/L 29   HLA B27 Typing       Specimen received - Immunology report to follow upon completion.   Exam: MRI of the sacroiliac joints dated 10/21/2015.  Comparison: 1/10/2014.  Clinical history: Evaluate for sacroiliitis.  Technique: Multiplanar, multisequence MR  imaging of the sacroiliac  joints were obtained using standard sequences in 2 orthogonal planes  before and after the uneventful administration of intravenous  gadolinium contrast.  Findings:  No significant fluid in the sacroiliac joints. No abnormal enhancement  to suggest sacroiliitis. No erosive changes are noted.  The muscle bulk is intact without significant atrophy or edema. The  visualized intrapelvic structures are unremarkable. No  lymphadenopathy. No full-thickness tear or tendon retraction.  No abnormal marrow signal to suggest fracture, osteonecrosis, or  marrow infiltration. Nonspecific subtle focus of T2 hyperintensity  within the left iliac bone, coronal series 3 image 10, likely  vascularity.  Large field-of-view limits evaluation the hip joints. No  full-thickness cartilage loss or joint effusion. The visualized lower  lumbar spine is unremarkable.  IMPRESSION  Impression:  1. No findings to suggest sacroiliitis.  2. No abnormal marrow signal to suggest fracture, osteonecrosis, or  marrow infiltration.  AVELINA HIGGINBOTHAM MD  Component      Latest Ref Rng & Units 12/16/2016   Cardiolipin Antibody IgG      0.0 - 19.9 GPL-U/mL 3.2   Cardiolipin Antibody IgM      0.0 - 19.9 MPL-U/mL 27.7 (H)   Cardiolipin Antibody IgA      0.0 - 19.9 APL U/mL 5.7   Beta 2 Glycoprotein 1 Antibody IgA      <7 U/mL 1.6     Component      Latest Ref Rng & Units 10/13/2017   WBC      4.0 - 11.0 10e9/L 7.3   RBC Count      3.8 - 5.2 10e12/L 4.13   Hemoglobin      11.7 - 15.7 g/dL 12.6   Hematocrit      35.0 - 47.0 % 37.5   MCV      78 - 100 fl 91   MCH      26.5 - 33.0 pg 30.5   MCHC      31.5 - 36.5 g/dL 33.6   RDW      10.0 - 15.0 % 11.8   Platelet Count      150 - 450 10e9/L 283   Diff Method       Automated Method   % Neutrophils      % 63.1   % Lymphocytes      % 25.2   % Monocytes      % 9.0   % Eosinophils      % 1.6   % Basophils      % 0.8   % Immature Granulocytes      % 0.3   Nucleated RBCs      0 /100 0   Absolute  Neutrophil      1.6 - 8.3 10e9/L 4.6   Absolute Lymphocytes      0.8 - 5.3 10e9/L 1.8   Absolute Monocytes      0.0 - 1.3 10e9/L 0.7   Absolute Eosinophils      0.0 - 0.7 10e9/L 0.1   Absolute Basophils      0.0 - 0.2 10e9/L 0.1   Abs Immature Granulocytes      0 - 0.4 10e9/L 0.0   Absolute Nucleated RBC       0.0   Color Urine       Yellow   Appearance Urine       Slightly Cloudy   Glucose Urine      NEG:Negative mg/dL Negative   Bilirubin Urine      NEG:Negative Negative   Ketones Urine      NEG:Negative mg/dL Negative   Specific Gravity Urine      1.003 - 1.035 1.015   Blood Urine      NEG:Negative Negative   pH Urine      5.0 - 7.0 pH 5.0   Protein Albumin Urine      NEG:Negative mg/dL Negative   Urobilinogen mg/dL      0.0 - 2.0 mg/dL 0.0   Nitrite Urine      NEG:Negative Negative   Leukocyte Esterase Urine      NEG:Negative Small (A)   Source       Midstream Urine   WBC Urine      0 - 2 /HPF 2   RBC Urine      0 - 2 /HPF 1   Bacteria Urine      NEG:Negative /HPF Few (A)   Squamous Epithelial /HPF Urine      0 - 1 /HPF 4 (H)   Mucous Urine      NEG:Negative /LPF Present (A)   Creatinine      0.52 - 1.04 mg/dL 0.91   GFR Estimate      >60 mL/min/1.7m2 67   GFR Estimate If Black      >60 mL/min/1.7m2 82   Cardiolipin Antibody IgG      0.0 - 19.9 GPL-U/mL 3.5   Cardiolipin Antibody IgM      0.0 - 19.9 MPL-U/mL 27.0 (H)   Protein Random Urine      g/L 0.18   Protein Total Urine g/gr Creatinine      0 - 0.2 g/g Cr 0.15   NANCY interpretation      NEG:Negative Negative   NANCY titer 1       1:40   Lupus Result      NEG:Negative Negative   Beta 2 Glycoprotein 1 Antibody IgM      <7 U/mL 8.9 (H)   Beta 2 Glycoprotein 1 Antibody IgG      <7 U/mL <0.6   AST      0 - 45 U/L 19   ALT      0 - 50 U/L 35   Albumin      3.4 - 5.0 g/dL 4.0   Complement C4      15 - 50 mg/dL 19   Complement C3      76 - 169 mg/dL 131   CRP Inflammation      0.0 - 8.0 mg/L <2.9   Sed Rate      0 - 20 mm/h 18   DNA-ds      <10 IU/mL 4   Creatinine  Urine      mg/dL 124     HISTORY REVIEW:  Past Medical History:   Diagnosis Date     Asthma      Hypertension      Inflammatory arthritis      Past Surgical History:   Procedure Laterality Date     CHOLECYSTECTOMY       GALLBLADDER SURGERY       TONSILLECTOMY       Family History   Problem Relation Age of Onset     Lupus Paternal Aunt      father had double lung transplant for alpha 1 anti-trypsin def     Arthritis Maternal Grandmother      RA     Hypertension Maternal Grandmother      Depression Maternal Grandmother      Arthritis Paternal Grandfather      RA     Family History Negative Other      neg for AS, psoriasis     GASTROINTESTINAL DISEASE Other      cousin has colitis     Diabetes Father      due to transplant     Allergies Father      Diabetes Other      Aunts on both sides     Hypertension Mother      Allergies Mother      Hypertension Maternal Grandfather      Arthritis Maternal Grandfather      Arthritis Paternal Grandmother      Other - See Comments Other      fibromyalgia - paternal aunt     Social History     Social History     Marital status:      Spouse name: N/A     Number of children: 0     Years of education: N/A     Occupational History      [employer]     Social History Main Topics     Smoking status: Never Smoker     Smokeless tobacco: Never Used     Alcohol use Yes      Comment: occ     Drug use: No     Sexual activity: Not on file     Other Topics Concern     Not on file     Social History Narrative       PMHx, FHx, SHx were reviewed, unchanged.    Pregnancy Hx: She is  s/p one miscarriage around 12 wk    Outpatient Encounter Prescriptions as of 10/12/2018   Medication Sig Dispense Refill     albuterol (VENTOLIN HFA) 108 (90 BASE) MCG/ACT inhaler Inhale 2 Puffs into the lungs four times a day as needed for Wheezing or Shortness of Breath (as needed). Shake before using.       cetirizine (ZYRTEC) 10 MG tablet Take 10 mg by mouth as needed for allergies       fluticasone  (FLOVENT HFA) 110 MCG/ACT inhaler Inhale 2 Puffs into the lungs two times a day. 4 puffs twice daily in yellow zone       hydroxychloroquine (PLAQUENIL) 200 MG tablet Take 1 tablet (200 mg) by mouth 2 times daily 180 tablet 1     labetalol (NORMODYNE) 200 MG tablet Take 100 mg by mouth 2 times daily       metFORMIN (GLUCOPHAGE) 500 MG tablet        norethindrone-ethinyl estradiol-iron (ESTROSTEP FE) 1-20/1-30/1-35 MG-MCG per tablet Take 1 tablet by mouth daily       Prenatal Multivit-Min-Fe-FA (PRENATAL VITAMINS PO) With iron       VITAMIN D, CHOLECALCIFEROL, PO Take 2,000 Units by mouth daily       No facility-administered encounter medications on file as of 10/12/2018.      Allergies   Allergen Reactions     Tetanus Immune Globulin      Fever     Tetanus Toxoid            Ph.E:    Vitals:    10/12/18 1024   BP: 120/77   Pulse: 65   Temp: 98.1  F (36.7  C)   TempSrc: Oral   SpO2: 98%   Weight: 63.8 kg (140 lb 9.6 oz)       Constitutional: WD/WN. Pleasant. In no acute distress.   Eyes: EOM intact, PERRLA, sclera anicteric, conj not injected  HEENT: No oral ulcers or thrush. Normal salivary pool.  Neck: No cervical LAP  Chest: Clear to auscultation bilaterally  CV: RRR, no murmurs/ rubs or gallops. No edema, clubbing or cyanosis.   GI: Abdomen is soft and non tender.     MS: no joint tenderness. No active synovitis. No joint deformities.  No nodules. +FM TP  Skin: No skin rash, malar rash, livedo, periungual erythema,digital ulcers or nail changes. + thinning of hair (allopecia).  Neuro: A&O x 3. 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 Received MRI report of L index finger 4/06  which showed L index finger edema from PIP to DIP (non specific finding). Her work up at initial visit in 1/2014 was  suggestive of seroneg non-erosive IA. She was recommended to increase lodine to 2 tab a day but could not tolerate it sec GI upset. Has FM TP but FMS can't explain all her pain including pain/tenderness/swelling over PIP joints, AM stiffness> 1hr and good response to steroid/SSZ in the past. For AI, recommended a trial of HCQ. She is on HCQ since 3/2014 with excellent response. Her IA is under excellent control on HCQ.    -neg HLA-B27 and neg pelvic MRI 10/2015 for sacroiliitis, SI joint pain resolved. Now has intermittent low back and shoulder pain, which is most likely due to FMS, seeing a chiropractor helps.    - continue plaquenil 200mg PO BID, was informed that's safe in pregnancy    -Recommend eye exam for HCQ monitoring. Eye exam is updated.    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 She was put on ASA+lovenox during IVF. She failed IVF fresh embryo (two) and leftover frozen embryo (one) transfer and was told given her age her best option would be to use donor embryo and now is on waiting list, hopes to get one soon. She is recommended to use ASA 81 mg qd+lovenox during future pregnancy with donor embryo and I agree given her h/os miscarriage at 12 wk. She has no h/o thrombosis. She still does not meet criteria for APS syndrome as miscarriage was still considered 1st trimester miscarriage.    She always had neg NANCY here and no features of SLE, I will re-check NANCY along with repeat APS Abs.     - follow-up in 12 months      MEDICATION CHANGES: None.    Orders Placed This Encounter   Procedures     Anti Nuclear Edith IgG by IFA with Reflex     CBC with platelets differential     Creatinine     AST     ALT     Albumin level     CRP inflammation     Erythrocyte sedimentation rate auto      Cardiolipin Edith IgG and IgM     Cardiolipin Antibody IgA     Lupus Anticoagulant Panel     Beta 2 Glycoprotein 1 Antibody IgA     Beta 2 Glycoprotein 1 Antibody IgG     Beta 2 Glycoprotein 1 Antibody IgM             HPI      ROS      Physical Exam

## 2018-10-14 LAB
CARDIOLIPIN ANTIBODY IGG: 2.8 GPL-U/ML (ref 0–19.9)
CARDIOLIPIN ANTIBODY IGM: 41.1 MPL-U/ML (ref 0–19.9)
CARDIOLIPIN IGA SERPL-ACNC: 6.5 APL U/ML (ref 0–19.9)

## 2018-10-15 LAB
B2 GLYCOPROT1 IGA SER-ACNC: 1.5 U/ML
B2 GLYCOPROT1 IGG SERPL IA-ACNC: 0.7 U/ML
B2 GLYCOPROT1 IGM SERPL IA-ACNC: 7.9 U/ML
LA PPP-IMP: NEGATIVE

## 2018-10-16 LAB — ANA SER QL IF: NEGATIVE

## 2018-11-07 NOTE — PROGRESS NOTES
Unchanged labs. Beta 2 glycoprotein I IgM remained positive which supports use of aspirin and Lovenox during future pregnancy.

## 2018-11-07 NOTE — PROGRESS NOTES
Unchanged labs. aCL IgM remained positive which supports use of aspirin and lovenox during future pregnancy.

## 2019-01-02 DIAGNOSIS — M06.4 UNDIFFERENTIATED INFLAMMATORY ARTHRITIS (H): ICD-10-CM

## 2019-01-06 RX ORDER — HYDROXYCHLOROQUINE SULFATE 200 MG/1
200 TABLET, FILM COATED ORAL 2 TIMES DAILY
Qty: 180 TABLET | Refills: 1 | Status: SHIPPED | OUTPATIENT
Start: 2019-01-06 | End: 2019-07-01

## 2019-01-06 NOTE — TELEPHONE ENCOUNTER
hydroxychloroquine (PLAQUENIL) 200 MG tablet  Last Written Prescription Date:  7/16/18  Last Fill Quantity: 180,   # refills: 1  Last Office Visit : 10/12/18  Future Office visit:  10/11/19     Eye exam 1/23 18    Routing refill request to provider for review/approval because:  Eye exam in media  10/15/18 . Does that apply to plaquenil eval?

## 2019-04-25 ENCOUNTER — TRANSFERRED RECORDS (OUTPATIENT)
Dept: HEALTH INFORMATION MANAGEMENT | Facility: CLINIC | Age: 44
End: 2019-04-25

## 2019-05-08 ENCOUNTER — MEDICAL CORRESPONDENCE (OUTPATIENT)
Dept: HEALTH INFORMATION MANAGEMENT | Facility: CLINIC | Age: 44
End: 2019-05-08

## 2019-05-16 NOTE — TELEPHONE ENCOUNTER
FUTURE VISIT INFORMATION      FUTURE VISIT INFORMATION:    Date: 7/29/19     Time: 5:00pm    Location: [facility]  REFERRAL INFORMATION:    Referring provider:  JOSHUA Canales    Referring providers clinic: [facility]    Reason for visit/diagnosis:  Chronic Sinusitis    RECORDS REQUESTED FROM:       Clinic name Comments Records Status Imaging Status   [facility] 4/25/19, 4/12/19, 1/15/19 - Office Visit - JOSHUA Canales Received    [facility] 12/28/16 - Office Visit - Dr. Eben Phipps Care Everywhere      5/16/19 10:55am - Faxed request for recs to [facility].  PP  5/16/19 4:41pm - Received faxed recs from [place]. Faxed to [facility].  PP

## 2019-07-01 DIAGNOSIS — M06.4 UNDIFFERENTIATED INFLAMMATORY ARTHRITIS (H): ICD-10-CM

## 2019-07-05 RX ORDER — HYDROXYCHLOROQUINE SULFATE 200 MG/1
200 TABLET, FILM COATED ORAL 2 TIMES DAILY
Qty: 180 TABLET | Refills: 1 | Status: SHIPPED | OUTPATIENT
Start: 2019-07-05 | End: 2020-01-24

## 2019-07-05 NOTE — TELEPHONE ENCOUNTER
hydroxychloroquine (PLAQUENIL) 200 MG tablet 200 mg, 2 TIMES DAILY       Last Written Prescription Date:  1/6/2019  Last Fill Quantity: 180,   # refills: 1  Last Office Visit : 10/12/2018  Future Office visit:  none    Routing refill request to provider for review/approval because:  Need 6 month appt. with Araceli.  Last eye exam 10/15/2018      Scheduling has been notified to contact the pt for appointment.

## 2019-07-29 ENCOUNTER — OFFICE VISIT (OUTPATIENT)
Dept: OTOLARYNGOLOGY | Facility: CLINIC | Age: 44
End: 2019-07-29
Payer: COMMERCIAL

## 2019-07-29 ENCOUNTER — PRE VISIT (OUTPATIENT)
Dept: OTOLARYNGOLOGY | Facility: CLINIC | Age: 44
End: 2019-07-29

## 2019-07-29 VITALS
WEIGHT: 149.9 LBS | BODY MASS INDEX: 29.43 KG/M2 | HEART RATE: 74 BPM | HEIGHT: 60 IN | RESPIRATION RATE: 16 BRPM | SYSTOLIC BLOOD PRESSURE: 127 MMHG | DIASTOLIC BLOOD PRESSURE: 74 MMHG

## 2019-07-29 DIAGNOSIS — J32.0 MAXILLARY SINUSITIS, UNSPECIFIED CHRONICITY: Primary | ICD-10-CM

## 2019-07-29 RX ORDER — TRAMADOL HYDROCHLORIDE 50 MG/1
50 TABLET ORAL
COMMUNITY
End: 2022-05-06

## 2019-07-29 RX ORDER — HYDROXYZINE HYDROCHLORIDE 25 MG/1
25 TABLET, FILM COATED ORAL EVERY 8 HOURS PRN
COMMUNITY
End: 2020-08-07

## 2019-07-29 RX ORDER — MONTELUKAST SODIUM 10 MG/1
1 TABLET ORAL DAILY
Refills: 3 | COMMUNITY
Start: 2019-07-09

## 2019-07-29 RX ORDER — ESCITALOPRAM OXALATE 10 MG/1
10 TABLET ORAL DAILY
Refills: 0 | COMMUNITY
Start: 2019-07-09

## 2019-07-29 RX ORDER — PNV,CALCIUM 72/IRON/FOLIC ACID 27 MG-1 MG
1 TABLET ORAL DAILY
Refills: 3 | COMMUNITY
Start: 2019-07-10 | End: 2019-08-12

## 2019-07-29 RX ORDER — ELAGOLIX 200 MG/1
TABLET, FILM COATED ORAL
Refills: 5 | COMMUNITY
Start: 2019-07-15 | End: 2020-08-07

## 2019-07-29 ASSESSMENT — PAIN SCALES - GENERAL: PAINLEVEL: NO PAIN (0)

## 2019-07-29 ASSESSMENT — MIFFLIN-ST. JEOR: SCORE: 1251.44

## 2019-07-29 NOTE — PROGRESS NOTES
Otolaryngology Clinic      2019   Patient: Gwendolyn Melgar   : 1975     Patient presents for consult regarding:  Recurrent sinusitis     Referring Provider: JOSHUA Cisneros  Consulting Physician:  Kristin Hartman MD       Assessment/Plan: Patient presents with frequent sinusitis, last episode 2019. She does use daily antihistamines and will heavily use decongestants with sinus symptoms. On exam, she has a significantly deviated septum to the left, discussed this is likely contributing to her heightened symptoms related to sinus infections. I recommended using zyrtec/antihistamines as needed and sparing use of sudafed. We discussed starting a saline spray for dryness and irrigation. Will place an allergy referral as well to discuss alternative options to treat patient's allergies.     We discussed obtaining a CT when she is acutely ill, patient is trying to conceive so will provide awareness to scheduling team if she is pregnant. Discussed potential repair of her deviated septum through one of my partners. Will have her follow up as needed.       History of Present Illness:   Gwendolyn Melgar is a 43 year old female with a history of asthma, environmental allergies, inflammatory arthritis and fibromyalgia who presents for consultation regarding chronic sinusitis.  Consultation was requested by JOSHUA Canales.       The patient reports recurrent sinusitis with 2-3 infections annually for many years. She reports bilateral maxillary and frontal sinus pain with congestion and rhinorrhea. She also reports associated bilateral ear pressure.  When she has symptoms she will use sudafed, Nyquil  Most recent infection was 2019. She does no use steroid nasal spray or saline irrigation. Currently, she has had frequent rhinorrhea, sometimes is blood tinged, and feels she frequently has to blow her nose. She uses Zyrtec daily. Of note, the patient is scheduled for in vitro fertilization in  August.     Review of Systems:   Pertinent items are noted in HPI or as in patient entered ROS below, remainder of complete ROS is negative.    ENT ROS 7/15/2019   Ears, Nose, Throat Nasal congestion or drainage   Allergy/Immunology Allergies or hay fever        Active Medications:     Current Outpatient Medications:      hydrOXYzine (ATARAX) 25 MG tablet, Take 25 mg by mouth every 8 hours as needed for itching, Disp: , Rfl:      tiZANidine (ZANAFLEX) 4 MG tablet, Take 4 mg by mouth 3 times daily, Disp: , Rfl:      albuterol (VENTOLIN HFA) 108 (90 BASE) MCG/ACT inhaler, Inhale 2 Puffs into the lungs four times a day as needed for Wheezing or Shortness of Breath (as needed). Shake before using., Disp: , Rfl:      BUDESONIDE NA, , Disp: , Rfl:      cetirizine (ZYRTEC) 10 MG tablet, Take 10 mg by mouth as needed for allergies, Disp: , Rfl:      escitalopram (LEXAPRO) 10 MG tablet, Take 10 mg by mouth daily, Disp: , Rfl: 0     fluticasone (FLOVENT HFA) 110 MCG/ACT inhaler, Inhale 2 Puffs into the lungs two times a day. 4 puffs twice daily in yellow zone, Disp: , Rfl:      hydroxychloroquine (PLAQUENIL) 200 MG tablet, Take 1 tablet (200 mg) by mouth 2 times daily, Disp: 180 tablet, Rfl: 1     labetalol (NORMODYNE) 200 MG tablet, Take 100 mg by mouth 2 times daily, Disp: , Rfl:      metFORMIN (GLUCOPHAGE) 500 MG tablet, , Disp: , Rfl:      montelukast (SINGULAIR) 10 MG tablet, Take 1 tablet by mouth daily, Disp: , Rfl: 3     norethindrone-ethinyl estradiol-iron (ESTROSTEP FE) 1-20/1-30/1-35 MG-MCG per tablet, Take 1 tablet by mouth daily, Disp: , Rfl:      ORILISSA 200 MG TABS, TAKE 200 MG BY MOUTH TWO TIMES A DAY., Disp: , Rfl: 5     Prenatal Multivit-Min-Fe-FA (PRENATAL VITAMINS PO), With iron, Disp: , Rfl:      Prenatal Vit-Fe Fumarate-FA (PREPLUS) 27-1 MG TABS, Take 1 tablet by mouth daily, Disp: , Rfl: 3     traMADol (ULTRAM) 50 MG tablet, Take 50 mg by mouth, Disp: , Rfl:      VITAMIN D, CHOLECALCIFEROL, PO, Take  2,000 Units by mouth daily, Disp: , Rfl:       Allergies:   Tetanus immune globulin and Tetanus toxoid      Past Medical History:   Asthma  Hypertension  Inflammatory arthritis  Alpha 1 carrier      Past Surgical History:  Cholecystectomy  Gallbladder surgery   Tonsillectomy     Family History:   Diabetes  Allergie  Hypertension        Social History:   Presents to clinic alone  Tobacco Use: Never smoker  Alcohol Use: Yes, occasional  PCP: Katy Dodson      Physical Exam:   /74   Pulse 74   Resp 16   Ht 1.524 m (5')   Wt 68 kg (149 lb 14.4 oz)   BMI 29.28 kg/m        General Assessment   The patient is alert, oriented and in no acute distress.   Head/Face/Scalp  Grossly normal.   Ears  Normal canals, auricles and tympanic membranes.   Nose  External nose is straight, skin is normal. Intranasal exam (anterior rhinoscopy) reveals bilateral dry mucosa, right turbinate tissue without edema, erythema or crusting. Left crusting and inflammation. Septum significantly deviated to the left   Mouth  Oral cavity shows healthy mucosa with out ulceration, masses or other lesions involving the tongue, palate, buccal mucosa, floor of mouth or gingiva.  Dentition in good repair.  Oropharynx with normal posterior wall and base of tongue. Surgical absence of bilateral tonsils.   Neck  No significant adenopathy, thyroid or salivary gland abnormality.     Scribe Disclosure:  I, Elvia Andres, am serving as a scribe to document services personally performed by Kristni Hartman MD at this visit, based upon the provider's statements to me. All documentation has been reviewed by the aforementioned provider prior to being entered into the official medical record.    The documentation recorded by the scribe accurately reflects the services I personally performed and the decisions made by me.  Kristin Hartman MD

## 2019-07-29 NOTE — PATIENT INSTRUCTIONS
1. You were seen in the ENT Clinic today by .  If you have any questions or concerns after your appointment, please call   - Option 1: ENT Clinic: [phone]  - Option 2: Trish ('s Nurse): [phone]    2.   Decrease Zyrtec and increase use of nasal saline for moisturization.     3.   Return to clinic as needed.      PATEL Chambers  [facility]- Otolaryngology  [phone]

## 2019-07-29 NOTE — LETTER
2019       RE: Gwendolyn Melgar  [id number] [address] [facility] [id number]     Dear Colleague,    Thank you for referring your patient, Gwendolyn Melgar, to the [facility] EAR NOSE AND THROAT at [facility]. Please see a copy of my visit note below.      Otolaryngology Clinic      2019   Patient: Gwendolyn Melgar   : 1975     Patient presents for consult regarding:  Recurrent sinusitis     Referring Provider: JOSHUA Cisneros  Consulting Physician:  Kristin Hartman MD       Assessment/Plan: Patient presents with frequent sinusitis, last episode 2019. She does use daily antihistamines and will heavily use decongestants with sinus symptoms. On exam, she has a significantly deviated septum to the left, discussed this is likely contributing to her heightened symptoms related to sinus infections. I recommended using zyrtec/antihistamines as needed and sparing use of sudafed. We discussed starting a saline spray for dryness and irrigation. Will place an allergy referral as well to discuss alternative options to treat patient's allergies.     We discussed obtaining a CT when she is acutely ill, patient is trying to conceive so will provide awareness to scheduling team if she is pregnant. Discussed potential repair of her deviated septum through one of my partners. Will have her follow up as needed.       History of Present Illness:   Gwendolyn Melgar is a 43 year old female with a history of asthma, environmental allergies, inflammatory arthritis and fibromyalgia who presents for consultation regarding chronic sinusitis.  Consultation was requested by JOSHUA Canales.       The patient reports recurrent sinusitis with 2-3 infections annually for many years. She reports bilateral maxillary and frontal sinus pain with congestion and rhinorrhea. She also reports associated bilateral ear pressure.  When she has symptoms she will use sudafed, Nyquil  Most recent  infection was April 2019. She does no use steroid nasal spray or saline irrigation. Currently, she has had frequent rhinorrhea, sometimes is blood tinged, and feels she frequently has to blow her nose. She uses Zyrtec daily. Of note, the patient is scheduled for in vitro fertilization in August.     Review of Systems:   Pertinent items are noted in HPI or as in patient entered ROS below, remainder of complete ROS is negative.    ENT ROS 7/15/2019   Ears, Nose, Throat Nasal congestion or drainage   Allergy/Immunology Allergies or hay fever        Active Medications:     Current Outpatient Medications:      hydrOXYzine (ATARAX) 25 MG tablet, Take 25 mg by mouth every 8 hours as needed for itching, Disp: , Rfl:      tiZANidine (ZANAFLEX) 4 MG tablet, Take 4 mg by mouth 3 times daily, Disp: , Rfl:      albuterol (VENTOLIN HFA) 108 (90 BASE) MCG/ACT inhaler, Inhale 2 Puffs into the lungs four times a day as needed for Wheezing or Shortness of Breath (as needed). Shake before using., Disp: , Rfl:      BUDESONIDE NA, , Disp: , Rfl:      cetirizine (ZYRTEC) 10 MG tablet, Take 10 mg by mouth as needed for allergies, Disp: , Rfl:      escitalopram (LEXAPRO) 10 MG tablet, Take 10 mg by mouth daily, Disp: , Rfl: 0     fluticasone (FLOVENT HFA) 110 MCG/ACT inhaler, Inhale 2 Puffs into the lungs two times a day. 4 puffs twice daily in yellow zone, Disp: , Rfl:      hydroxychloroquine (PLAQUENIL) 200 MG tablet, Take 1 tablet (200 mg) by mouth 2 times daily, Disp: 180 tablet, Rfl: 1     labetalol (NORMODYNE) 200 MG tablet, Take 100 mg by mouth 2 times daily, Disp: , Rfl:      metFORMIN (GLUCOPHAGE) 500 MG tablet, , Disp: , Rfl:      montelukast (SINGULAIR) 10 MG tablet, Take 1 tablet by mouth daily, Disp: , Rfl: 3     norethindrone-ethinyl estradiol-iron (ESTROSTEP FE) 1-20/1-30/1-35 MG-MCG per tablet, Take 1 tablet by mouth daily, Disp: , Rfl:      ORILISSA 200 MG TABS, TAKE 200 MG BY MOUTH TWO TIMES A DAY., Disp: , Rfl: 5      Prenatal Multivit-Min-Fe-FA (PRENATAL VITAMINS PO), With iron, Disp: , Rfl:      Prenatal Vit-Fe Fumarate-FA (PREPLUS) 27-1 MG TABS, Take 1 tablet by mouth daily, Disp: , Rfl: 3     traMADol (ULTRAM) 50 MG tablet, Take 50 mg by mouth, Disp: , Rfl:      VITAMIN D, CHOLECALCIFEROL, PO, Take 2,000 Units by mouth daily, Disp: , Rfl:       Allergies:   Tetanus immune globulin and Tetanus toxoid      Past Medical History:   Asthma  Hypertension  Inflammatory arthritis  Alpha 1 carrier      Past Surgical History:  Cholecystectomy  Gallbladder surgery   Tonsillectomy     Family History:   Diabetes  Allergie  Hypertension        Social History:   Presents to clinic alone  Tobacco Use: Never smoker  Alcohol Use: Yes, occasional  PCP: Katy Dodson      Physical Exam:   /74   Pulse 74   Resp 16   Ht 1.524 m (5')   Wt 68 kg (149 lb 14.4 oz)   BMI 29.28 kg/m         General Assessment   The patient is alert, oriented and in no acute distress.   Head/Face/Scalp  Grossly normal.   Ears  Normal canals, auricles and tympanic membranes.   Nose  External nose is straight, skin is normal. Intranasal exam (anterior rhinoscopy) reveals bilateral dry mucosa, right turbinate tissue without edema, erythema or crusting. Left crusting and inflammation. Septum significantly deviated to the left   Mouth  Oral cavity shows healthy mucosa with out ulceration, masses or other lesions involving the tongue, palate, buccal mucosa, floor of mouth or gingiva.  Dentition in good repair.  Oropharynx with normal posterior wall and base of tongue. Surgical absence of bilateral tonsils.   Neck  No significant adenopathy, thyroid or salivary gland abnormality.     Scribe Disclosure:  Elvia MANTILLA, am serving as a scribe to document services personally performed by Kristin Hartman MD at this visit, based upon the provider's statements to me. All documentation has been reviewed by the aforementioned provider prior to being entered into the  official medical record.    The documentation recorded by the scribe accurately reflects the services I personally performed and the decisions made by me.  Kristin Hartman MD

## 2019-08-12 ENCOUNTER — OFFICE VISIT (OUTPATIENT)
Dept: ALLERGY | Facility: CLINIC | Age: 44
End: 2019-08-12
Payer: COMMERCIAL

## 2019-08-12 VITALS
WEIGHT: 149.91 LBS | HEART RATE: 60 BPM | DIASTOLIC BLOOD PRESSURE: 79 MMHG | SYSTOLIC BLOOD PRESSURE: 125 MMHG | TEMPERATURE: 98 F | BODY MASS INDEX: 28.3 KG/M2 | OXYGEN SATURATION: 97 % | HEIGHT: 61 IN

## 2019-08-12 DIAGNOSIS — J31.0 CHRONIC RHINITIS: ICD-10-CM

## 2019-08-12 DIAGNOSIS — J34.2 DNS (DEVIATED NASAL SEPTUM): ICD-10-CM

## 2019-08-12 DIAGNOSIS — J30.1 SEASONAL ALLERGIC RHINITIS DUE TO POLLEN: ICD-10-CM

## 2019-08-12 DIAGNOSIS — J45.30 MILD PERSISTENT ASTHMA WITHOUT COMPLICATION: ICD-10-CM

## 2019-08-12 DIAGNOSIS — J32.9 RECURRENT SINUS INFECTIONS: Primary | ICD-10-CM

## 2019-08-12 LAB
FEF 25/75: NORMAL
FEV-1: NORMAL
FEV1/FVC: NORMAL
FVC: NORMAL

## 2019-08-12 PROCEDURE — 94010 BREATHING CAPACITY TEST: CPT | Performed by: ALLERGY & IMMUNOLOGY

## 2019-08-12 PROCEDURE — 95004 PERQ TESTS W/ALRGNC XTRCS: CPT | Performed by: ALLERGY & IMMUNOLOGY

## 2019-08-12 PROCEDURE — 99244 OFF/OP CNSLTJ NEW/EST MOD 40: CPT | Mod: 25 | Performed by: ALLERGY & IMMUNOLOGY

## 2019-08-12 ASSESSMENT — ENCOUNTER SYMPTOMS
EYE REDNESS: 0
DIARRHEA: 0
RHINORRHEA: 1
NAUSEA: 0
MYALGIAS: 0
CHEST TIGHTNESS: 0
SORE THROAT: 1
COUGH: 1
ADENOPATHY: 0
SHORTNESS OF BREATH: 0
HEADACHES: 1
JOINT SWELLING: 0
WHEEZING: 0
ARTHRALGIAS: 0
EYE ITCHING: 0
VOMITING: 0
ACTIVITY CHANGE: 0
SINUS PRESSURE: 1
CHILLS: 0
FEVER: 0
FACIAL SWELLING: 0
EYE DISCHARGE: 0

## 2019-08-12 ASSESSMENT — MIFFLIN-ST. JEOR: SCORE: 1267.76

## 2019-08-12 NOTE — TELEPHONE ENCOUNTER
FUTURE VISIT INFORMATION      FUTURE VISIT INFORMATION:    Date: 8/21/19    Time: 2:20PM    Location: [facility]  REFERRAL INFORMATION:    Referring provider:  Dr. Kristin Hartman    Referring providers clinic:  [facility]    Reason for visit/diagnosis:  deviated septum to left.discuss surgery.    RECORDS REQUESTED FROM:       Clinic name Comments Records Status Imaging Status   [facility] 7/29/19 notes with Dr Hartman [facility] 4/25/19, 4/12/19, 1/15/19 - Office Visit - JOSHUA Canales Scanned in [facility] Allergy & Immunology  8/12/19 notes with Dr Augusto Montaño EPIC

## 2019-08-12 NOTE — PATIENT INSTRUCTIONS
Start Rhinocort over the counter, 1 spray in each nostril once daily or twice daily.  Try to take cetirizine 10 mg by mouth once daily as needed.     Use nasal salines once daily.

## 2019-08-12 NOTE — PROGRESS NOTES
SUBJECTIVE:                                                                   Gwendolyn Melgar is a 43 year is a 43-year-old female who presents today to our Allergy Clinic at [facility]; she is being seen in consultation at the request of Kristin Hartman MD.     The patient states that she has a history of recurrent sinus infections, usually 3 or more episodes/year since 2015.  For the last 12 months, she had 3-4 episodes. All of them were treated with antibiotics. Augmentin seems to be effective. She starts antibiotics after she has symptoms for at least 2 weeks.  She hasn't had a sinus CT. No history of previous sinus surgeries.  Most of her sinus infections are in Winter.    It is manifested by facial pressure pain, headaches, nasal congestion, aural fullness, and yellow-green rhinorrhea.  Between episodes, she has nasal congestion and rhinorrhea. She had these episodes even before her sinus symptoms started. There is a perennial pattern with maybe Summer being worse.  Mowing grass and exposure to cats make her symptoms worse.  In the past. She tried intranasal fluticasone and budesonide. She feels that budesonide worked, but both nasal sprays caused sores in the left nostril.   The patient was taking Zyrtec-D daily for 8 years. She just recently stopped it. When she is symptomatic, she uses NyQuil and oral decongestants.  The patient was seen by ENT.  She does have a septal deviation to the left.  Sinus surgery is being planned.  Evaluation for environmental/seasonal allergy component was suggested.    She also has a history of asthma since her 30's. Triggers: wheezing, chest tightness. Trigger cold air. No issues with upper respiratory infections. Albuterol is helpful immediately. She was on Flovent in the past. After she was switched to montelukast 10 mg by mouth once daily.     She has been diagnosed as a carrier for alpha 1 antitrypsin deficiency. Her father had a lung transplant.     The  "patient was tested in the past by Dr. Eben Phipps in [facility]. Per notes in 2007:   \"Selective skin testing was performed via puncture technique to aeroallergens in the tree, grass, weed, fungi and common inhalant groups. She had mild to moderate reactivity to grasses and cats, mild reaction to ragweed and a hint of reaction to dust mites and dog\".        Patient Active Problem List   Diagnosis     DNS (deviated nasal septum)       Past Medical History:   Diagnosis Date     Anxiety 2013     Asthma      Chronic sinusitis 2018     Hypertension      Inflammatory arthritis       Problem (# of Occurrences) Relation (Name,Age of Onset)    Allergies (2) Father (Aunt), Mother (florencia cloud)    Arthritis (4) Maternal Grandmother (Ashley Garcia): RA, Paternal Grandfather: RA, Maternal Grandfather (Raul Garcia), Paternal Grandmother    Depression (1) Maternal Grandmother (Ashley Garcia)    Diabetes (3) Father (Aunt): due to transplant, Other: Aunts on both sides, Other (aunt)    Family History Negative (1) Other: neg for AS, psoriasis    Gastrointestinal Disease (1) Other: cousin has colitis    Hypertension (3) Maternal Grandmother (Ashley Garcia), Mother (florencia cloud), Maternal Grandfather (Raul Garcia)    Lupus (1) Paternal Aunt: father had double lung transplant for alpha 1 anti-trypsin def    Other - See Comments (1) Other: fibromyalgia - paternal aunt        Past Surgical History:   Procedure Laterality Date     CHOLECYSTECTOMY       GALLBLADDER SURGERY       GYN SURGERY  2018    both my Fallopian tube, and right ovary, and appendix     HYSTERECTOMY Right     Partial- right ovary removed     TONSILLECTOMY       TONSILLECTOMY  1993     Social History     Socioeconomic History     Marital status:      Spouse name: None     Number of children: 0     Years of education: None     Highest education level: None   Occupational History     Occupation:      Employer: DNR   Social Needs     Financial " resource strain: None     Food insecurity:     Worry: None     Inability: None     Transportation needs:     Medical: None     Non-medical: None   Tobacco Use     Smoking status: Never Smoker     Smokeless tobacco: Never Used   Substance and Sexual Activity     Alcohol use: Yes     Comment: occ     Drug use: No     Sexual activity: Yes     Partners: Male     Birth control/protection: None   Lifestyle     Physical activity:     Days per week: None     Minutes per session: None     Stress: None   Relationships     Social connections:     Talks on phone: None     Gets together: None     Attends Jainism service: None     Active member of club or organization: None     Attends meetings of clubs or organizations: None     Relationship status: None     Intimate partner violence:     Fear of current or ex partner: None     Emotionally abused: None     Physically abused: None     Forced sexual activity: None   Other Topics Concern     None   Social History Narrative    August 12, 2019        ENVIRONMENTAL HISTORY: [place] home is heated with a gas furnace and infloor heating. They do have central air conditioning. The patient's bedroom is furnished with carpeting in bedroom and fabric window coverings.  No pets inside the house. There is no history of cockroach or mice infestation. There are no smokers in the house.  The house does not have a basement.            Review of Systems   Constitutional: Negative for activity change, chills and fever.   HENT: Positive for ear pain (Pressure), postnasal drip, rhinorrhea, sinus pressure, sneezing and sore throat. Negative for congestion, dental problem, facial swelling and nosebleeds.    Eyes: Negative for discharge, redness and itching.   Respiratory: Positive for cough. Negative for chest tightness, shortness of breath and wheezing.    Cardiovascular: Negative for chest pain.   Gastrointestinal: Negative for diarrhea, nausea and  vomiting.   Musculoskeletal: Negative for arthralgias, joint swelling and myalgias.   Skin: Negative for rash.   Neurological: Positive for headaches (Sinus).   Hematological: Negative for adenopathy.   Psychiatric/Behavioral: Negative for behavioral problems and self-injury.           Current Outpatient Medications:      budesonide (RINOCORT AQUA) 32 MCG/ACT nasal spray, Spray 1 spray into both nostrils daily, Disp: 8.6 Bottle, Rfl: 3     escitalopram (LEXAPRO) 10 MG tablet, Take 10 mg by mouth daily, Disp: , Rfl: 0     hydroxychloroquine (PLAQUENIL) 200 MG tablet, Take 1 tablet (200 mg) by mouth 2 times daily, Disp: 180 tablet, Rfl: 1     labetalol (NORMODYNE) 200 MG tablet, Take 100 mg by mouth 2 times daily, Disp: , Rfl:      metFORMIN (GLUCOPHAGE) 500 MG tablet, , Disp: , Rfl:      montelukast (SINGULAIR) 10 MG tablet, Take 1 tablet by mouth daily, Disp: , Rfl: 3     ORILISSA 200 MG TABS, TAKE 200 MG BY MOUTH TWO TIMES A DAY., Disp: , Rfl: 5     Prenatal Multivit-Min-Fe-FA (PRENATAL VITAMINS PO), With iron, Disp: , Rfl:      VITAMIN D, CHOLECALCIFEROL, PO, Take 2,000 Units by mouth daily, Disp: , Rfl:      albuterol (VENTOLIN HFA) 108 (90 BASE) MCG/ACT inhaler, Inhale 2 Puffs into the lungs four times a day as needed for Wheezing or Shortness of Breath (as needed). Shake before using., Disp: , Rfl:      BUDESONIDE NA, , Disp: , Rfl:      cetirizine (ZYRTEC) 10 MG tablet, Take 10 mg by mouth as needed for allergies, Disp: , Rfl:      fluticasone (FLOVENT HFA) 110 MCG/ACT inhaler, Inhale 2 Puffs into the lungs two times a day. 4 puffs twice daily in yellow zone, Disp: , Rfl:      hydrOXYzine (ATARAX) 25 MG tablet, Take 25 mg by mouth every 8 hours as needed for itching, Disp: , Rfl:      norethindrone-ethinyl estradiol-iron (ESTROSTEP FE) 1-20/1-30/1-35 MG-MCG per tablet, Take 1 tablet by mouth daily, Disp: , Rfl:      tiZANidine (ZANAFLEX) 4 MG tablet, Take 4 mg by mouth 3 times daily, Disp: , Rfl:      traMADol  "(ULTRAM) 50 MG tablet, Take 50 mg by mouth, Disp: , Rfl:   Immunization History   Administered Date(s) Administered     HepB 01/01/1995     Influenza (IIV3) PF 10/01/2013     MMR 02/03/1992     Pneumococcal 23 valent 10/01/2001     Allergies   Allergen Reactions     Tetanus Immune Globulin      Fever     Tetanus Toxoid      OBJECTIVE:                                                                 /79 (BP Location: Left arm, Patient Position: Sitting, Cuff Size: Adult Regular)   Pulse 60   Temp 98  F (36.7  C) (Tympanic)   Ht 1.55 m (5' 1.02\")   Wt 68 kg (149 lb 14.6 oz)   SpO2 97%   BMI 28.30 kg/m          Physical Exam   Constitutional: She is oriented to person, place, and time. No distress.   HENT:   Head: Normocephalic and atraumatic.   Right Ear: Tympanic membrane, external ear and ear canal normal.   Left Ear: Tympanic membrane, external ear and ear canal normal.   Nose: Septal deviation (significant, to the left) present. No mucosal edema or rhinorrhea.   Mouth/Throat: Oropharynx is clear and moist and mucous membranes are normal. No oropharyngeal exudate, posterior oropharyngeal edema or posterior oropharyngeal erythema.   Eyes: Conjunctivae are normal. Right eye exhibits no discharge. Left eye exhibits no discharge.   Neck: Normal range of motion.   Cardiovascular: Normal rate, regular rhythm and normal heart sounds.   No murmur heard.  Pulmonary/Chest: Effort normal and breath sounds normal. No respiratory distress. She has no wheezes. She has no rales.   Musculoskeletal: Normal range of motion.   Lymphadenopathy:     She has no cervical adenopathy.   Neurological: She is alert and oriented to person, place, and time.   Skin: Skin is warm. No rash noted. She is not diaphoretic.   Psychiatric: She has a normal mood and affect. Her behavior is normal.   Nursing note and vitals reviewed.        WORKUP:   SPIROMETRY       FVC 3.69L (112% of predicted).     FEV1 3.24L (122% of predicted).     " FEV1/FVC 88%     FEF 25%-75%  4.82L/s (171% of predicted)    My interpretation: The office spirometry performed today doesn't suggest an obstruction.      Asthma Control Test (ACT) total score: 24       ENVIRONMENTAL PERCUTANEOUS SKIN TESTING: ADULT  [facility] 8/12/2019   Consent Y   Ordering Physician Sabra   Interpreting Physician Sabra   Testing Technician Mirlande AARON RN   Location Back   Time start: 12:25 PM   Time End: 12:40 PM   Positive Control: Histatrol*ALK 1 mg/ml 5/8   Negative Control: 50% Glycerin 0/0   Cat Hair*ALK (10,000 BAU/ml) 3/7   AP Dog Hair/Dander (1:100 w/v) 0/0   Dust Mite p. 30,000 AU/ml 0/0   Dust Mite f. (30,000 AU/ml) 0/0   Trung (W/F in millimeters) 0/0   [place] Grass (100,000 BAU/mL) 14/35   Red Grandin (W/F in millimeters) 0/0   Maple/Hurst (W/F in millimeters) 0/0   Hackberry (W/F in millimeters) 0/0   Sutter (W/F in millimeters) 0/0   [place] *ALK (W/F in millimeters) 0/0   American Elm (W/F in millimeters) 0/0   T[place]on (W/F in millimeters) 0/0   Black Cayuga (W/F in millimeters) 0/0   Birch Mix (W/F in millimeters) 0/0   Wharton (W/F in millimeters) 0/0   Oak (W/F in millimeters) 0/0   Cocklebur (W/F in millimeters) 0/0   Beulah (W/F in millimeters) 0/0   White Dwight (W/F in millimeters) 0/0   Careless (W/F in millimeters) 0/0   Nettle (W/F in millimeters) 0/0   English Plantain (W/F in millimeters) 0/0   Kochia (W/F in millimeters) 0/0   Lamb's Quarter (W/F in millimeters) 0/0   Marshelder (W/F in millimeters) 0/0   Ragweed Mix* ALK (W/F in millimeters) 0/0   Russian Thistle (W/F in millimeters) 0/0   Sagebrush/Mugwort (W/F in millimeters) 0/0   Sheep Sorrel (W/F in millimeters) 0/0   Feather Mix* ALK (W/F in millimeters) 0/0   Penicillium Mix (1:10 w/v) 0/0   Curvularia spicifera (1:10 w/v) 0/0   Epicoccum (1:10 w/v) 0/0   Aspergillus fumigatus (1:10 w/v): 0/0   Alternaria tenius (1:10 w/v) 0/0   H. Cladosporium (1:10 w/v) 0/0   Phoma herbarum (1:10 w/v)  0/0   Cow* ALK (W/F in millimeters) 0/0      My interpretation: Percutaneous skin puncture testing for aeroallergens performed today (August 12, 2019) showed sensitivity to cat and [place] grass pollen.  The rest was negative with appropriate responses to positive and negative controls.    ASSESSMENT/PLAN:      1. Recurrent sinus infections  (primary encounter diagnosis) 2. Chronic rhinitis 3. Seasonal allergic rhinitis due to pollen 4. DNS (deviated nasal septum)     The patient does have the sensitivity to grass pollen and cat.  It is consistent with Summer and cat exposure rhinoconjunctivitis exacerbations.  Avoidance measures discussed and information provided.  Also, think that nasal congestion with a perennial pattern is due to the deviated nasal septum.  By occluding the right nostril, she cannot exchange air through the left one.  The patient states that she is using budesonide by prescription.  I am not familiar with the fact the Rhinocort is covered.  I wonder if she was using something else.  -I recommend starting a trial of Rhinocort 1 spray in each nostril once-twice daily.  - use saline rinses once-twice daily.  -She can use cetirizine 10 mg by mouth daily as needed, but without a decongestant.    Depending on future symptom control, sinus CT would be recommended to assess for chronic disease.   ALLERGY SKIN TESTS,ALLERGENS, budesonide         (RINOCORT AQUA) 32 MCG/ACT nasal spray              5. Mild persistent asthma without complication    Currently well controlled with montelukast 10 mg by mouth daily and albuterol inhaler as needed.  -Continue as is.    Spirometry, Breathing Capacity, ALLERGY SKIN         TESTS,ALLERGENS           Return in about 6 weeks (around 9/23/2019), or if symptoms worsen or fail to improve.    Thank you for allowing us to participate in the care of this patient. Please feel free to contact us if there are any questions or concerns about the patient.    Disclaimer: This  note consists of symbols derived from keyboarding, dictation and/or voice recognition software. As a result, there may be errors in the script that have gone undetected. Please consider this when interpreting information found in this chart.    Augusto Montaño MD, FAAAAI, FACAAI  Allergy, Asthma and Immunology  [place], [place] and [place]

## 2019-08-12 NOTE — NURSING NOTE
Aeroallergen avoidance measures were discussed with the patient and literature was provided.     Mirlande AARON   Allergy RN

## 2019-08-12 NOTE — LETTER
8/12/2019         RE: Gwendolyn Melgar  [id number] [facility] [address]        Dear Colleague,    Thank you for referring your patient, Gwendolyn Melgar, to the [facility]. Please see a copy of my visit note below.    SUBJECTIVE:                                                                   Gwendolyn Melgar is a 43 year is a 43-year-old female who presents today to our Allergy Clinic at [facility]; she is being seen in consultation at the request of Kristin Hartman MD.     The patient states that she has a history of recurrent sinus infections, usually 3 or more episodes/year since 2015.  For the last 12 months, she had 3-4 episodes. All of them were treated with antibiotics. Augmentin seems to be effective. She starts antibiotics after she has symptoms for at least 2 weeks.  She hasn't had a sinus CT. No history of previous sinus surgeries.  Most of her sinus infections are in Winter.    It is manifested by facial pressure pain, headaches, nasal congestion, aural fullness, and yellow-green rhinorrhea.  Between episodes, she has nasal congestion and rhinorrhea. She had these episodes even before her sinus symptoms started. There is a perennial pattern with maybe Summer being worse.  Mowing grass and exposure to cats make her symptoms worse.  In the past. She tried intranasal fluticasone and budesonide. She feels that budesonide worked, but both nasal sprays caused sores in the left nostril.   The patient was taking Zyrtec-D daily for 8 years. She just recently stopped it. When she is symptomatic, she uses NyQuil and oral decongestants.  The patient was seen by ENT.  She does have a septal deviation to the left.  Sinus surgery is being planned.  Evaluation for environmental/seasonal allergy component was suggested.    She also has a history of asthma since her 30's. Triggers: wheezing, chest tightness. Trigger cold air. No issues with upper respiratory infections. Albuterol is  "helpful immediately. She was on Flovent in the past. After she was switched to montelukast 10 mg by mouth once daily.     She has been diagnosed as a carrier for alpha 1 antitrypsin deficiency. Her father had a lung transplant.     The patient was tested in the past by Dr. Eben Phipps in [facility]. Per notes in 2007:   \"Selective skin testing was performed via puncture technique to aeroallergens in the tree, grass, weed, fungi and common inhalant groups. She had mild to moderate reactivity to grasses and cats, mild reaction to ragweed and a hint of reaction to dust mites and dog\".        Patient Active Problem List   Diagnosis     DNS (deviated nasal septum)       Past Medical History:   Diagnosis Date     Anxiety 2013     Asthma      Chronic sinusitis 2018     Hypertension      Inflammatory arthritis       Problem (# of Occurrences) Relation (Name,Age of Onset)    Allergies (2) Father (Aunt), Mother (florencia cloud)    Arthritis (4) Maternal Grandmother (Ashley Garcia): RA, Paternal Grandfather: RA, Maternal Grandfather (Raul Garcia), Paternal Grandmother    Depression (1) Maternal Grandmother (Ashley Garcia)    Diabetes (3) Father (Aunt): due to transplant, Other: Aunts on both sides, Other (aunt)    Family History Negative (1) Other: neg for AS, psoriasis    Gastrointestinal Disease (1) Other: cousin has colitis    Hypertension (3) Maternal Grandmother (Ashley Garcia), Mother (florencia cloud), Maternal Grandfather (Raul Garcia)    Lupus (1) Paternal Aunt: father had double lung transplant for alpha 1 anti-trypsin def    Other - See Comments (1) Other: fibromyalgia - paternal aunt        Past Surgical History:   Procedure Laterality Date     CHOLECYSTECTOMY       GALLBLADDER SURGERY       GYN SURGERY  2018    both my Fallopian tube, and right ovary, and appendix     HYSTERECTOMY Right     Partial- right ovary removed     TONSILLECTOMY       TONSILLECTOMY  1993     Social History     Socioeconomic History     " Marital status:      Spouse name: None     Number of children: 0     Years of education: None     Highest education level: None   Occupational History     Occupation:      Employer: D[employer]   Social Needs     Financial resource strain: None     Food insecurity:     Worry: None     Inability: None     Transportation needs:     Medical: None     Non-medical: None   Tobacco Use     Smoking status: Never Smoker     Smokeless tobacco: Never Used   Substance and Sexual Activity     Alcohol use: Yes     Comment: occ     Drug use: No     Sexual activity: Yes     Partners: Male     Birth control/protection: None   Lifestyle     Physical activity:     Days per week: None     Minutes per session: None     Stress: None   Relationships     Social connections:     Talks on phone: None     Gets together: None     Attends Jew service: None     Active member of club or organization: None     Attends meetings of clubs or organizations: None     Relationship status: None     Intimate partner violence:     Fear of current or ex partner: None     Emotionally abused: None     Physically abused: None     Forced sexual activity: None   Other Topics Concern     None   Social History Narrative    August 12, 2019        ENVIRONMENTAL HISTORY: [place] is heated with a gas furnace and infloor heating. They do have central air conditioning. The patient's bedroom is furnished with carpeting in bedroom and fabric window coverings.  No pets inside the house. There is no history of cockroach or mice infestation. There are no smokers in the house.  The house does not have a basement.            Review of Systems   Constitutional: Negative for activity change, chills and fever.   HENT: Positive for ear pain (Pressure), postnasal drip, rhinorrhea, sinus pressure, sneezing and sore throat. Negative for congestion, dental problem, facial swelling and nosebleeds.    Eyes:  Negative for discharge, redness and itching.   Respiratory: Positive for cough. Negative for chest tightness, shortness of breath and wheezing.    Cardiovascular: Negative for chest pain.   Gastrointestinal: Negative for diarrhea, nausea and vomiting.   Musculoskeletal: Negative for arthralgias, joint swelling and myalgias.   Skin: Negative for rash.   Neurological: Positive for headaches (Sinus).   Hematological: Negative for adenopathy.   Psychiatric/Behavioral: Negative for behavioral problems and self-injury.           Current Outpatient Medications:      budesonide (RINOCORT AQUA) 32 MCG/ACT nasal spray, Spray 1 spray into both nostrils daily, Disp: 8.6 Bottle, Rfl: 3     escitalopram (LEXAPRO) 10 MG tablet, Take 10 mg by mouth daily, Disp: , Rfl: 0     hydroxychloroquine (PLAQUENIL) 200 MG tablet, Take 1 tablet (200 mg) by mouth 2 times daily, Disp: 180 tablet, Rfl: 1     labetalol (NORMODYNE) 200 MG tablet, Take 100 mg by mouth 2 times daily, Disp: , Rfl:      metFORMIN (GLUCOPHAGE) 500 MG tablet, , Disp: , Rfl:      montelukast (SINGULAIR) 10 MG tablet, Take 1 tablet by mouth daily, Disp: , Rfl: 3     ORILISSA 200 MG TABS, TAKE 200 MG BY MOUTH TWO TIMES A DAY., Disp: , Rfl: 5     Prenatal Multivit-Min-Fe-FA (PRENATAL VITAMINS PO), With iron, Disp: , Rfl:      VITAMIN D, CHOLECALCIFEROL, PO, Take 2,000 Units by mouth daily, Disp: , Rfl:      albuterol (VENTOLIN HFA) 108 (90 BASE) MCG/ACT inhaler, Inhale 2 Puffs into the lungs four times a day as needed for Wheezing or Shortness of Breath (as needed). Shake before using., Disp: , Rfl:      BUDESONIDE NA, , Disp: , Rfl:      cetirizine (ZYRTEC) 10 MG tablet, Take 10 mg by mouth as needed for allergies, Disp: , Rfl:      fluticasone (FLOVENT HFA) 110 MCG/ACT inhaler, Inhale 2 Puffs into the lungs two times a day. 4 puffs twice daily in yellow zone, Disp: , Rfl:      hydrOXYzine (ATARAX) 25 MG tablet, Take 25 mg by mouth every 8 hours as needed for itching, Disp:  ", Rfl:      norethindrone-ethinyl estradiol-iron (ESTROSTEP FE) 1-20/1-30/1-35 MG-MCG per tablet, Take 1 tablet by mouth daily, Disp: , Rfl:      tiZANidine (ZANAFLEX) 4 MG tablet, Take 4 mg by mouth 3 times daily, Disp: , Rfl:      traMADol (ULTRAM) 50 MG tablet, Take 50 mg by mouth, Disp: , Rfl:   Immunization History   Administered Date(s) Administered     HepB 01/01/1995     Influenza (IIV3) PF 10/01/2013     MMR 02/03/1992     Pneumococcal 23 valent 10/01/2001     Allergies   Allergen Reactions     Tetanus Immune Globulin      Fever     Tetanus Toxoid      OBJECTIVE:                                                                 /79 (BP Location: Left arm, Patient Position: Sitting, Cuff Size: Adult Regular)   Pulse 60   Temp 98  F (36.7  C) (Tympanic)   Ht 1.55 m (5' 1.02\")   Wt 68 kg (149 lb 14.6 oz)   SpO2 97%   BMI 28.30 kg/m           Physical Exam   Constitutional: She is oriented to person, place, and time. No distress.   HENT:   Head: Normocephalic and atraumatic.   Right Ear: Tympanic membrane, external ear and ear canal normal.   Left Ear: Tympanic membrane, external ear and ear canal normal.   Nose: Septal deviation (significant, to the left) present. No mucosal edema or rhinorrhea.   Mouth/Throat: Oropharynx is clear and moist and mucous membranes are normal. No oropharyngeal exudate, posterior oropharyngeal edema or posterior oropharyngeal erythema.   Eyes: Conjunctivae are normal. Right eye exhibits no discharge. Left eye exhibits no discharge.   Neck: Normal range of motion.   Cardiovascular: Normal rate, regular rhythm and normal heart sounds.   No murmur heard.  Pulmonary/Chest: Effort normal and breath sounds normal. No respiratory distress. She has no wheezes. She has no rales.   Musculoskeletal: Normal range of motion.   Lymphadenopathy:     She has no cervical adenopathy.   Neurological: She is alert and oriented to person, place, and time.   Skin: Skin is warm. No rash noted. " She is not diaphoretic.   Psychiatric: She has a normal mood and affect. Her behavior is normal.   Nursing note and vitals reviewed.        WORKUP:   SPIROMETRY       FVC 3.69L (112% of predicted).     FEV1 3.24L (122% of predicted).     FEV1/FVC 88%     FEF 25%-75%  4.82L/s (171% of predicted)    My interpretation: The office spirometry performed today doesn't suggest an obstruction.      Asthma Control Test (ACT) total score: 24       ENVIRONMENTAL PERCUTANEOUS SKIN TESTING: ADULT  [place] [facility] 8/12/2019   Consent Y   Ordering Physician Sabra   Interpreting Physician Sabra   Testing Technician Mirlande AARON RN   Location Back   Time start: 12:25 PM   Time End: 12:40 PM   Positive Control: Histatrol*ALK 1 mg/ml 5/8   Negative Control: 50% Glycerin 0/0   Cat Hair*ALK (10,000 BAU/ml) 3/7   AP Dog Hair/Dander (1:100 w/v) 0/0   Dust Mite p. 30,000 AU/ml 0/0   Dust Mite f. (30,000 AU/ml) 0/0   Trung (W/F in millimeters) 0/0   [place] Grass (100,000 BAU/mL) 14/35   Red [place]aton (W/F in millimeters) 0/0   Maple/Sarpy (W/F in millimeters) 0/0   Hackberry (W/F in millimeters) 0/0   Kapolei (W/F in millimeters) 0/0   [place] *ALK (W/F in millimeters) 0/0   American Elm (W/F in millimeters) 0/0   Harnett (W/F in millimeters) 0/0   Black Crowell (W/F in millimeters) 0/0   Birch Mix (W/F in millimeters) 0/0   Lyons (W/F in millimeters) 0/0   Oak (W/F in millimeters) 0/0   Cocklebur (W/F in millimeters) 0/0   Peachland (W/F in millimeters) 0/0   White Dwight (W/F in millimeters) 0/0   Careless (W/F in millimeters) 0/0   Nettle (W/F in millimeters) 0/0   English Plantain (W/F in millimeters) 0/0   Kochia (W/F in millimeters) 0/0   Lamb's Quarter (W/F in millimeters) 0/0   Marshelder (W/F in millimeters) 0/0   Ragweed Mix* ALK (W/F in millimeters) 0/0   Russian Thistle (W/F in millimeters) 0/0   Sagebrush/Mugwort (W/F in millimeters) 0/0   Sheep Sorrel (W/F in millimeters) 0/0   Feather Mix* ALK (W/F in millimeters)  0/0   Penicillium Mix (1:10 w/v) 0/0   Curvularia spicifera (1:10 w/v) 0/0   Epicoccum (1:10 w/v) 0/0   Aspergillus fumigatus (1:10 w/v): 0/0   Alternaria tenius (1:10 w/v) 0/0   H. Cladosporium (1:10 w/v) 0/0   Phoma herbarum (1:10 w/v) 0/0   Cow* ALK (W/F in millimeters) 0/0      My interpretation: Percutaneous skin puncture testing for aeroallergens performed today (August 12, 2019) showed sensitivity to cat and [place] grass pollen.  The rest was negative with appropriate responses to positive and negative controls.    ASSESSMENT/PLAN:      1. Recurrent sinus infections  (primary encounter diagnosis) 2. Chronic rhinitis 3. Seasonal allergic rhinitis due to pollen 4. DNS (deviated nasal septum)     The patient does have the sensitivity to grass pollen and cat.  It is consistent with Summer and cat exposure rhinoconjunctivitis exacerbations.  Avoidance measures discussed and information provided.  Also, think that nasal congestion with a perennial pattern is due to the deviated nasal septum.  By occluding the right nostril, she cannot exchange air through the left one.  The patient states that she is using budesonide by prescription.  I am not familiar with the fact the Rhinocort is covered.  I wonder if she was using something else.  -I recommend starting a trial of Rhinocort 1 spray in each nostril once-twice daily.  - use saline rinses once-twice daily.  -She can use cetirizine 10 mg by mouth daily as needed, but without a decongestant.    Depending on future symptom control, sinus CT would be recommended to assess for chronic disease.   ALLERGY SKIN TESTS,ALLERGENS, budesonide         (RINOCORT AQUA) 32 MCG/ACT nasal spray              5. Mild persistent asthma without complication    Currently well controlled with montelukast 10 mg by mouth daily and albuterol inhaler as needed.  -Continue as is.    Spirometry, Breathing Capacity, ALLERGY SKIN         TESTS,ALLERGENS           Return in about 6 weeks (around  9/23/2019), or if symptoms worsen or fail to improve.    Thank you for allowing us to participate in the care of this patient. Please feel free to contact us if there are any questions or concerns about the patient.    Disclaimer: This note consists of symbols derived from keyboarding, dictation and/or voice recognition software. As a result, there may be errors in the script that have gone undetected. Please consider this when interpreting information found in this chart.    Augusto Montaño MD, FAAAAI, FACAAI  Allergy, Asthma and Immunology  [place], [place] and [place]      Again, thank you for allowing me to participate in the care of your patient.        Sincerely,        Augusto Montaño MD

## 2019-08-12 NOTE — NURSING NOTE
Per provider verbal order, RN placed Adult Environmental Panel and Cow scratch testing panels.  Consent was obtained prior to procedure.  Once panels were placed, patient was monitored for 15 minutes in clinic.  RN read test after 15 minutes and provider was notified of results.  Pt tolerated procedure well.  All questions and concerns were addressed at office visit.     Mirlande AARON   Allergy RN

## 2019-08-13 ASSESSMENT — ASTHMA QUESTIONNAIRES: ACT_TOTALSCORE: 24

## 2019-08-21 ENCOUNTER — OFFICE VISIT (OUTPATIENT)
Dept: OTOLARYNGOLOGY | Facility: CLINIC | Age: 44
End: 2019-08-21
Payer: COMMERCIAL

## 2019-08-21 ENCOUNTER — PRE VISIT (OUTPATIENT)
Dept: OTOLARYNGOLOGY | Facility: CLINIC | Age: 44
End: 2019-08-21

## 2019-08-21 VITALS
BODY MASS INDEX: 28.89 KG/M2 | DIASTOLIC BLOOD PRESSURE: 84 MMHG | HEART RATE: 74 BPM | SYSTOLIC BLOOD PRESSURE: 135 MMHG | WEIGHT: 153 LBS

## 2019-08-21 DIAGNOSIS — J34.89 NASAL OBSTRUCTION: ICD-10-CM

## 2019-08-21 DIAGNOSIS — J34.2 DEVIATED NASAL SEPTUM: Primary | ICD-10-CM

## 2019-08-21 ASSESSMENT — PAIN SCALES - GENERAL: PAINLEVEL: NO PAIN (0)

## 2019-08-21 NOTE — PROGRESS NOTES
Facial Plastic and Reconstructive Surgery Consultation    Referring Provider:  Kristin Hartman Md    HPI:   I had the pleasure of seeing Gwendolyn Melgar today in clinic for consultation for nasal obstruction with severe septal deviation.   Gwendolyn Melgar is a 43 year old female who was referred to me by Dr. Kristin Hartman for evaluation of a severely deviated nasal septum.  The patient does not recall any nasal trauma and has not had any nasal surgeries but has had significant trouble with sinusitis.  This was evaluated by Dr. Hartman who found that the significant obstruction was quite contributory to her symptoms.  She has not had any imaging during the time of possible infection to the fact if she was primarily getting these when she was pregnant.  She has miscarried at 11 weeks and subsequently has had significant fertility troubles.    This is relevant as she is due to have a donor embryo transfer in October.  She thus was highly motivated to have the surgery prior to that procedure or it would have to wait for a year after hopefully successful transfer pregnancy and delivery of her child.    She is very debilitated at night.  She is through the day has consistent obstruction on the left does not improve by any interventions.        Review Of Systems  ROS: 10 point ROS neg other than the symptoms noted above in the HPI.    Patient Active Problem List   Diagnosis     DNS (deviated nasal septum)     Past Surgical History:   Procedure Laterality Date     CHOLECYSTECTOMY       GALLBLADDER SURGERY       GYN SURGERY  2018    both my Fallopian tube, and right ovary, and appendix     HYSTERECTOMY Right     Partial- right ovary removed     TONSILLECTOMY       TONSILLECTOMY  1993     Current Outpatient Medications   Medication Sig Dispense Refill     albuterol (VENTOLIN HFA) 108 (90 BASE) MCG/ACT inhaler Inhale 2 Puffs into the lungs four times a day as needed for Wheezing or Shortness of Breath (as needed). Shake before using.        budesonide (RINOCORT AQUA) 32 MCG/ACT nasal spray Spray 1 spray into both nostrils daily 8.6 Bottle 3     BUDESONIDE NA        cetirizine (ZYRTEC) 10 MG tablet Take 10 mg by mouth as needed for allergies       escitalopram (LEXAPRO) 10 MG tablet Take 10 mg by mouth daily  0     fluticasone (FLOVENT HFA) 110 MCG/ACT inhaler Inhale 2 Puffs into the lungs two times a day. 4 puffs twice daily in yellow zone       hydroxychloroquine (PLAQUENIL) 200 MG tablet Take 1 tablet (200 mg) by mouth 2 times daily 180 tablet 1     hydrOXYzine (ATARAX) 25 MG tablet Take 25 mg by mouth every 8 hours as needed for itching       labetalol (NORMODYNE) 200 MG tablet Take 100 mg by mouth 2 times daily       metFORMIN (GLUCOPHAGE) 500 MG tablet        montelukast (SINGULAIR) 10 MG tablet Take 1 tablet by mouth daily  3     norethindrone-ethinyl estradiol-iron (ESTROSTEP FE) 1-20/1-30/1-35 MG-MCG per tablet Take 1 tablet by mouth daily       ORILISSA 200 MG TABS TAKE 200 MG BY MOUTH TWO TIMES A DAY.  5     Prenatal Multivit-Min-Fe-FA (PRENATAL VITAMINS PO) With iron       tiZANidine (ZANAFLEX) 4 MG tablet Take 4 mg by mouth 3 times daily       traMADol (ULTRAM) 50 MG tablet Take 50 mg by mouth       VITAMIN D, CHOLECALCIFEROL, PO Take 2,000 Units by mouth daily       Tetanus immune globulin and Tetanus toxoid  Social History     Socioeconomic History     Marital status:      Spouse name: Not on file     Number of children: 0     Years of education: Not on file     Highest education level: Not on file   Occupational History     Occupation:      Employer: [employer]   Social Needs     Financial resource strain: Not on file     Food insecurity:     Worry: Not on file     Inability: Not on file     Transportation needs:     Medical: Not on file     Non-medical: Not on file   Tobacco Use     Smoking status: Never Smoker     Smokeless tobacco: Never Used   Substance and Sexual Activity     Alcohol use: Yes     Comment: occ      Drug use: No     Sexual activity: Yes     Partners: Male     Birth control/protection: None   Lifestyle     Physical activity:     Days per week: Not on file     Minutes per session: Not on file     Stress: Not on file   Relationships     Social connections:     Talks on phone: Not on file     Gets together: Not on file     Attends [employer]ist service: Not on file     Active member of club or organization: Not on file     Attends meetings of clubs or organizations: Not on file     Relationship status: Not on file     Intimate partner violence:     Fear of current or ex partner: Not on file     Emotionally abused: Not on file     Physically abused: Not on file     Forced sexual activity: Not on file   Other Topics Concern     Not on file   Social History Narrative    August 12, 2019        ENVIRONMENTAL HISTORY: [place] is heated with a gas furnace and infloor heating. They do have central air conditioning. The patient's bedroom is furnished with carpeting in bedroom and fabric window coverings.  No pets inside the house. There is no history of cockroach or mice infestation. There are no smokers in the house.  The house does not have a basement.      Family History   Problem Relation Age of Onset     Lupus Paternal Aunt         father had double lung transplant for alpha 1 anti-trypsin def     Arthritis Maternal Grandmother         RA     Hypertension Maternal Grandmother      Depression Maternal Grandmother      Arthritis Paternal Grandfather         RA     Family History Negative Other         neg for AS, psoriasis     Gastrointestinal Disease Other         cousin has colitis     Diabetes Father         due to transplant     Allergies Father      Diabetes Other         Aunts on both sides     Hypertension Mother      Allergies Mother      Hypertension Maternal Grandfather      Arthritis Maternal Grandfather      Arthritis Paternal Grandmother      Other - See  Comments Other         fibromyalgia - paternal aunt     Diabetes Other        PE:  Alert and Oriented, Answering Questions Appropriately  Atraumatic, Normocephalic, Face Symmetric  Skin: Minor 2  Facial Nerve Intact and facial movement symmetric  EOM's, PEERL  Nasal Exam: No significant external deformity however just based view demonstrates that her right nostril is patent but the left nostril is completely obstructed by the septum.  She has about 90% obstruction of the left nasal passage and no Q-tips scope or any further regimentation can be passed into the nose.  This is in contact almost with the lateral nasal wall and fully obstructing the left.  On the right she is patent and notes the deviation away from that side towards the left side.  She has a posterior deflection that is back towards the right posteriorly.  No mucopurulence or polyps that visualized on the right however the left could not be fully examined.  Chin: Normal   Lips/Teeth/Toungue/Gums: Lips intact, Normal Dentition, Occlusion intact, Oral mucosa intact, no lesions/ulcerations/masses, Tongue mobile  Neck: No lymphadenopathy, no thyromegaly, trachea midline  Chest: No wheezing, cyanosis, or stridor  Card: Normal upper extremity pulses and capillary refill, not diaphoretic  Neuro/Psych: CN's 2-12 intact, Moves all extremities, ambulation in intact, positive affect, no notable muscle weakness        IMPRESSION:  Severe septal deviation  Nasal obstruction        PLAN:    Gwendolyn Melgar presents today for consultation for nasal obstruction. He is significantly debilitated by the inability to effectively move air through his nose. There are visible structural deficits that would not be improved with medical therapy. The noted deformities on exam require surgical correction. These would not be addressed or corrected with a septoplasty alone, as the structural deficits arise in the dorsal L strut supportive aspect of the septum. Noted  deformities on exam result from significant trauma leading to external and internal deformities affecting the form and function of the nose. The findings are also resultant from the acquired deformity of the nasal bones from the fracture.     She would benefit significantly from a septal surgery to correct her nasal breathing as she has no function of the left side of her nasal passage.  We are limited however in terms of timing as she is undergoing the embryo transfer in October.  I do not think would be able to get prior authorization and schedule her before this procedure and I also would not want to have her under general anesthesia and recovering from significant surgery so close to the donor procedure.  This would then place this hopefully if she gets pregnant looking at surgery in a years time.  I did state that we proceed with prior authorization and have that available just in case transfer was not successful and she would want to proceed with surgery.    She is in full agreement with this time and understands the perioperative time.  And the risks and benefits of surgery.  We went over the details of what to expect.    Photodocumentation was obtained.     I spent a total of 30 minutes face-to-face with Gwendolyn Melgar during today's office visit.  Over 50% of this time was spent counseling the patient and/or coordinating care regarding septal deformity.  See note for details.

## 2019-08-21 NOTE — LETTER
8/21/2019     RE: Gwendolyn Melgar  [id number] [facility] [address]     Dear Colleague,    Thank you for referring your patient, Gwendolyn Melgar, to the [facility] EAR NOSE AND THROAT at [facility]. Please see a copy of my visit note below.    Facial Plastic and Reconstructive Surgery Consultation    Referring Provider:  Kristin Hartman Md    HPI:   I had the pleasure of seeing Gwendolyn Melgar today in clinic for consultation for nasal obstruction with severe septal deviation.   Gwendolyn Melgar is a 43 year old female who was referred to me by Dr. Kristin Hartman for evaluation of a severely deviated nasal septum.  The patient does not recall any nasal trauma and has not had any nasal surgeries but has had significant trouble with sinusitis.  This was evaluated by Dr. Hartman who found that the significant obstruction was quite contributory to her symptoms.  She has not had any imaging during the time of possible infection to the fact if she was primarily getting these when she was pregnant.  She has miscarried at 11 weeks and subsequently has had significant fertility troubles.    This is relevant as she is due to have a donor embryo transfer in October.  She thus was highly motivated to have the surgery prior to that procedure or it would have to wait for a year after hopefully successful transfer pregnancy and delivery of her child.    She is very debilitated at night.  She is through the day has consistent obstruction on the left does not improve by any interventions.        Review Of Systems  ROS: 10 point ROS neg other than the symptoms noted above in the HPI.    Patient Active Problem List   Diagnosis     DNS (deviated nasal septum)     Past Surgical History:   Procedure Laterality Date     CHOLECYSTECTOMY       GALLBLADDER SURGERY       GYN SURGERY  2018    both my Fallopian tube, and right ovary, and appendix     HYSTERECTOMY Right     Partial- right ovary removed     TONSILLECTOMY        TONSILLECTOMY  1993     Current Outpatient Medications   Medication Sig Dispense Refill     albuterol (VENTOLIN HFA) 108 (90 BASE) MCG/ACT inhaler Inhale 2 Puffs into the lungs four times a day as needed for Wheezing or Shortness of Breath (as needed). Shake before using.       budesonide (RINOCORT AQUA) 32 MCG/ACT nasal spray Spray 1 spray into both nostrils daily 8.6 Bottle 3     BUDESONIDE NA        cetirizine (ZYRTEC) 10 MG tablet Take 10 mg by mouth as needed for allergies       escitalopram (LEXAPRO) 10 MG tablet Take 10 mg by mouth daily  0     fluticasone (FLOVENT HFA) 110 MCG/ACT inhaler Inhale 2 Puffs into the lungs two times a day. 4 puffs twice daily in yellow zone       hydroxychloroquine (PLAQUENIL) 200 MG tablet Take 1 tablet (200 mg) by mouth 2 times daily 180 tablet 1     hydrOXYzine (ATARAX) 25 MG tablet Take 25 mg by mouth every 8 hours as needed for itching       labetalol (NORMODYNE) 200 MG tablet Take 100 mg by mouth 2 times daily       metFORMIN (GLUCOPHAGE) 500 MG tablet        montelukast (SINGULAIR) 10 MG tablet Take 1 tablet by mouth daily  3     norethindrone-ethinyl estradiol-iron (ESTROSTEP FE) 1-20/1-30/1-35 MG-MCG per tablet Take 1 tablet by mouth daily       ORILISSA 200 MG TABS TAKE 200 MG BY MOUTH TWO TIMES A DAY.  5     Prenatal Multivit-Min-Fe-FA (PRENATAL VITAMINS PO) With iron       tiZANidine (ZANAFLEX) 4 MG tablet Take 4 mg by mouth 3 times daily       traMADol (ULTRAM) 50 MG tablet Take 50 mg by mouth       VITAMIN D, CHOLECALCIFEROL, PO Take 2,000 Units by mouth daily       Tetanus immune globulin and Tetanus toxoid  Social History     Socioeconomic History     Marital status:      Spouse name: Not on file     Number of children: 0     Years of education: Not on file     Highest education level: Not on file   Occupational History     Occupation:      Employer: [employer]   Social Needs     Financial resource strain: Not on file     Food insecurity:      Worry: Not on file     Inability: Not on file     Transportation needs:     Medical: Not on file     Non-medical: Not on file   Tobacco Use     Smoking status: Never Smoker     Smokeless tobacco: Never Used   Substance and Sexual Activity     Alcohol use: Yes     Comment: occ     Drug use: No     Sexual activity: Yes     Partners: Male     Birth control/protection: None   Lifestyle     Physical activity:     Days per week: Not on file     Minutes per session: Not on file     Stress: Not on file   Relationships     Social connections:     Talks on phone: Not on file     Gets together: Not on file     Attends Orthodoxy service: Not on file     Active member of club or organization: Not on file     Attends meetings of clubs or organizations: Not on file     Relationship status: Not on file     Intimate partner violence:     Fear of current or ex partner: Not on file     Emotionally abused: Not on file     Physically abused: Not on file     Forced sexual activity: Not on file   Other Topics Concern     Not on file   Social History Narrative    August 12, 2019        ENVIRONMENTAL HISTORY: The family [place] in a [facility] [place] The home is heated with a gas furnace and infloor heating. They do have central air conditioning. The patient's bedroom is furnished with carpeting in bedroom and fabric window coverings.  No pets inside the house. There is no history of cockroach or mice infestation. There are no smokers in the house.  The house does not have a basement.      Family History   Problem Relation Age of Onset     Lupus Paternal Aunt         father had double lung transplant for alpha 1 anti-trypsin def     Arthritis Maternal Grandmother         RA     Hypertension Maternal Grandmother      Depression Maternal Grandmother      Arthritis Paternal Grandfather         RA     Family History Negative Other         neg for AS, psoriasis     Gastrointestinal Disease Other         cousin has colitis     Diabetes  Father         due to transplant     Allergies Father      Diabetes Other         Aunts on both sides     Hypertension Mother      Allergies Mother      Hypertension Maternal Grandfather      Arthritis Maternal Grandfather      Arthritis Paternal Grandmother      Other - See Comments Other         fibromyalgia - paternal aunt     Diabetes Other        PE:  Alert and Oriented, Answering Questions Appropriately  Atraumatic, Normocephalic, Face Symmetric  Skin: Minor 2  Facial Nerve Intact and facial movement symmetric  EOM's, PEERL  Nasal Exam: No significant external deformity however just based view demonstrates that her right nostril is patent but the left nostril is completely obstructed by the septum.  She has about 90% obstruction of the left nasal passage and no Q-tips scope or any further regimentation can be passed into the nose.  This is in contact almost with the lateral nasal wall and fully obstructing the left.  On the right she is patent and notes the deviation away from that side towards the left side.  She has a posterior deflection that is back towards the right posteriorly.  No mucopurulence or polyps that visualized on the right however the left could not be fully examined.  Chin: Normal   Lips/Teeth/Toungue/Gums: Lips intact, Normal Dentition, Occlusion intact, Oral mucosa intact, no lesions/ulcerations/masses, Tongue mobile  Neck: No lymphadenopathy, no thyromegaly, trachea midline  Chest: No wheezing, cyanosis, or stridor  Card: Normal upper extremity pulses and capillary refill, not diaphoretic  Neuro/Psych: CN's 2-12 intact, Moves all extremities, ambulation in intact, positive affect, no notable muscle weakness        IMPRESSION:  Severe septal deviation  Nasal obstruction        PLAN:    Gwendolyn Melgar presents today for consultation for nasal obstruction. He is significantly debilitated by the inability to effectively move air through his nose. There are visible structural deficits that  would not be improved with medical therapy. The noted deformities on exam require surgical correction. These would not be addressed or corrected with a septoplasty alone, as the structural deficits arise in the dorsal L strut supportive aspect of the septum. Noted deformities on exam result from significant trauma leading to external and internal deformities affecting the form and function of the nose. The findings are also resultant from the acquired deformity of the nasal bones from the fracture.     She would benefit significantly from a septal surgery to correct her nasal breathing as she has no function of the left side of her nasal passage.  We are limited however in terms of timing as she is undergoing the embryo transfer in October.  I do not think would be able to get prior authorization and schedule her before this procedure and I also would not want to have her under general anesthesia and recovering from significant surgery so close to the donor procedure.  This would then place this hopefully if she gets pregnant looking at surgery in a years time.  I did state that we proceed with prior authorization and have that available just in case transfer was not successful and she would want to proceed with surgery.    She is in full agreement with this time and understands the perioperative time.  And the risks and benefits of surgery.  We went over the details of what to expect.    Photodocumentation was obtained.     I spent a total of 30 minutes face-to-face with Gwendolyn Melgar during today's office visit.  Over 50% of this time was spent counseling the patient and/or coordinating care regarding septal deformity.  See note for details.    Again, thank you for allowing me to participate in the care of your patient.      Sincerely,    Eli Castillo MD

## 2019-08-21 NOTE — NURSING NOTE
Chief Complaint   Patient presents with     Consult     Deviated septum     Blood pressure 135/84, pulse 74, weight 69.4 kg (153 lb).      Edwina Nion EMT

## 2019-08-22 DIAGNOSIS — J34.2 DEVIATED NASAL SEPTUM: Primary | ICD-10-CM

## 2019-08-22 DIAGNOSIS — J34.89 NASAL OBSTRUCTION: ICD-10-CM

## 2019-08-22 RX ORDER — CEFAZOLIN SODIUM 1 G/50ML
1 INJECTION, SOLUTION INTRAVENOUS SEE ADMIN INSTRUCTIONS
Status: CANCELLED | OUTPATIENT
Start: 2019-08-22

## 2019-08-22 RX ORDER — CEFAZOLIN SODIUM 2 G/50ML
2 SOLUTION INTRAVENOUS
Status: CANCELLED | OUTPATIENT
Start: 2019-08-22

## 2019-10-04 ENCOUNTER — TRANSFERRED RECORDS (OUTPATIENT)
Dept: HEALTH INFORMATION MANAGEMENT | Facility: CLINIC | Age: 44
End: 2019-10-04

## 2019-10-11 ENCOUNTER — OFFICE VISIT (OUTPATIENT)
Dept: RHEUMATOLOGY | Facility: CLINIC | Age: 44
End: 2019-10-11
Attending: INTERNAL MEDICINE
Payer: COMMERCIAL

## 2019-10-11 VITALS
TEMPERATURE: 97.5 F | SYSTOLIC BLOOD PRESSURE: 138 MMHG | BODY MASS INDEX: 31 KG/M2 | HEART RATE: 73 BPM | HEIGHT: 60 IN | DIASTOLIC BLOOD PRESSURE: 59 MMHG | WEIGHT: 157.9 LBS

## 2019-10-11 DIAGNOSIS — Z23 NEED FOR VACCINATION: ICD-10-CM

## 2019-10-11 DIAGNOSIS — M06.4 UNDIFFERENTIATED INFLAMMATORY ARTHRITIS (H): ICD-10-CM

## 2019-10-11 DIAGNOSIS — Z23 NEED FOR VACCINATION: Primary | ICD-10-CM

## 2019-10-11 DIAGNOSIS — M75.51 SUBACROMIAL BURSITIS OF RIGHT SHOULDER JOINT: ICD-10-CM

## 2019-10-11 LAB
C3 SERPL-MCNC: 119 MG/DL (ref 76–169)
C4 SERPL-MCNC: 15 MG/DL (ref 15–50)
DEPRECATED CALCIDIOL+CALCIFEROL SERPL-MC: 85 UG/L (ref 20–75)
ENA SS-A IGG SER IA-ACNC: <0.2 AI (ref 0–0.9)
ENA SS-B IGG SER IA-ACNC: <0.2 AI (ref 0–0.9)

## 2019-10-11 PROCEDURE — 86160 COMPLEMENT ANTIGEN: CPT | Performed by: INTERNAL MEDICINE

## 2019-10-11 PROCEDURE — 36415 COLL VENOUS BLD VENIPUNCTURE: CPT | Performed by: INTERNAL MEDICINE

## 2019-10-11 PROCEDURE — 86225 DNA ANTIBODY NATIVE: CPT | Performed by: INTERNAL MEDICINE

## 2019-10-11 PROCEDURE — 86235 NUCLEAR ANTIGEN ANTIBODY: CPT | Performed by: INTERNAL MEDICINE

## 2019-10-11 PROCEDURE — 90686 IIV4 VACC NO PRSV 0.5 ML IM: CPT | Mod: ZF | Performed by: INTERNAL MEDICINE

## 2019-10-11 PROCEDURE — G0008 ADMIN INFLUENZA VIRUS VAC: HCPCS | Mod: ZF

## 2019-10-11 PROCEDURE — 82306 VITAMIN D 25 HYDROXY: CPT | Performed by: INTERNAL MEDICINE

## 2019-10-11 PROCEDURE — G0463 HOSPITAL OUTPT CLINIC VISIT: HCPCS | Mod: 25,ZF

## 2019-10-11 PROCEDURE — 86038 ANTINUCLEAR ANTIBODIES: CPT | Performed by: INTERNAL MEDICINE

## 2019-10-11 PROCEDURE — 25000128 H RX IP 250 OP 636: Mod: ZF | Performed by: INTERNAL MEDICINE

## 2019-10-11 RX ADMIN — INFLUENZA A VIRUS A/BRISBANE/02/2018 IVR-190 (H1N1) ANTIGEN (FORMALDEHYDE INACTIVATED), INFLUENZA A VIRUS A/KANSAS/14/2017 X-327 (H3N2) ANTIGEN (FORMALDEHYDE INACTIVATED), INFLUENZA B VIRUS B/PHUKET/3073/2013 ANTIGEN (FORMALDEHYDE INACTIVATED), AND INFLUENZA B VIRUS B/MARYLAND/15/2016 BX-69A ANTIGEN (FORMALDEHYDE INACTIVATED) 0.5 ML: 15; 15; 15; 15 INJECTION, SUSPENSION INTRAMUSCULAR at 11:30

## 2019-10-11 ASSESSMENT — MIFFLIN-ST. JEOR: SCORE: 1287.73

## 2019-10-11 ASSESSMENT — PAIN SCALES - GENERAL: PAINLEVEL: MILD PAIN (2)

## 2019-10-11 NOTE — LETTER
10/11/2019      RE: Gwendolyn Melgar  [id number] [facility] [id number]       Rheumatology F/U Note    Date of last visit: 10/12/2018  Today's visit date: 10/11/2019    Reason for visit: Seronegative non-erosive inflammatory arthritis        HPI from initial visit    Gwendolyn Melgar is a 38 yo WF who was referred to our clinic for evaluation and management of her IA.    On 1/30/2006, she woke up with pain/swelling of L 2nd finger. Was treated with prednisone and reportedly L hand MRI showed inflammatory arthritis. She had neg HLA-B27, NANCY, RF and anti-CCP in 2006. She was referred to rheumatology and was put on SSZ (? dose, ?2-3 tab twice a day). She was able to make a full fist and her joint sx improved. She was on that x 6 months and was told to stop it. Her arthritis was under good control x 3 yr.     In 2010, started having low back pain. She had problem with low back pain and getting out of the chair. At that time, had hand AM stiffness in AM to the point that could not open her hands. She was put on lodine. It helped her. Was told to taper the dose from 2 a day to 1 a day.    Last summer, they bought a new house and her father had double lung transplant. She started having low back pain in 10/2013, had severe pain to the point that she could not get off the floor without help. She was prescribed prednisone 20 max over 20 days. It helped her. Had unremarkable L hip x-ray. Never had SI joint x-ray or MRI.    Was recommended to start anti-TNF Tx but she is concerned about side effects and is seeking second opinion.      Reports fatigue. Currently has a cold with dry cough. Has h/o alopecia areata, has thin hair. Has occasional diarrhea sec anxiety, anxiety on lexapro, myalgia, occasional numbness/tingling ta night in hands which wakes her up at night.    Today, has pain around shoulders and hips which could be sec sitting at desc and cold.    Interval hx: Her extensive rheumatologic work up at initial visit was  negative. I diagnosed her with seroneg non-erosive IA and advised her to increase lodine from 1 to 2 a day which she did but could not tolerate it a sit caused GI upset. She had pain/tenderness/swelling of PIP joints with AM stiffness> 1 hr. Prescribed her  mg bid in 3/2014, she reports significant improvement of joint sx on it. Tolerates it well. Was also put her on cymbalta at the same time helped with her mood and back pain. She stopped cymbalta as had pregnancy plan, reports occasional LBP off cymbalta. Had leg cramps at last visit, zanaflex was prescribed but leg cramps resolved on its won and she did not try the zanaflex. R hip bursitis improved after doing PT.    She is feeling well today. Just found out to be pregnant on Maddox's day. She is 6 wk pregnant, is excited about it. Her TG is 10/17/2015. She is due for eye exam in 9/2015. She is still taking the HCQ and is helping, no joint pain/swelling or AM stiffness. Had pain over R foot on walking about 6 wk ago when she was in [place], it eventually resolved on its own.        12/2016: Doing well with no joint pain or arthritis flare. Now is going through IVF, had one round in 11/2016 which was not successful, now is going to do another round in January 2017. She developed R sciatica pain after 1st round of IVF which resolved after working with chiropractor. 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 I don't have her test results, she thinks they were tested only once.      10/2017: No arthritis flare up since last visit, doing well. Has ongoing aching around shoulders and low back which is intermittent, started to see a chiropractor and it's helping. She failed IVF, one fresh embryo (x2 embryos) transfer and one frozen embryo transfer. Now is on waiting list to get donor embryo, is hoping to get one by March 2018.  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 Her OB believes that + APS antibodies might explain the fetal loss at 12 wk when Gwendolyn got pregnant without assistance; however it was never confirmed, no genetic testing was done on fetus.    She reports no recurrence of hand stiffness or pain since she started taking HCQ.    10/2018: 2wk ago, started to hurt over R shoulder pain and L 2nd finger pain. Otherwise had a good yr. Taking a hot bath helps. Tylenol or ibuprofen helps. zanaflex helps with leg cramps and pain.    Sometimes gets canker sores.    No major fatigue.    Overall doing fairly well with no major arthritis flare up on HCQ, waiting for donor embryo. On 8/3/2018, had surgery for endometriosis, it was converted to open laparotomy, right salpingo-oophorectomy. Appendectomy. Left salpingectomy      Today 10/11/2019: R shoulder was hurting last winter, got better and now the pain is back. When it gets cold, her 5th fingers hurt. R 5th PIP hurt. No joint swelling.     Hip pain is good.    She does stretching exercises.    Now goes to walking track at high school and also does bowling.    Last eye exam was on 10/4/2019.    In the process of getting donated embryo transferred (10/29/2019), excited about it.     ROS:  A comprehensive ROS was done, positives are per HPI.      Interval History (2015)  Pt states that she had a spontaneous  at approximately 12 weeks.  Since that time she has had a recurrence of her bilateral lower hip and back pain that started approximately 1-2 months after the miscarriage occurred.  Morning stiffness duration varies with activity and achiness that lasts for approximately on hour. Pt states that he rpain is currenlty a 1-2/10 at its maximum, 3-4/10 at maximum.  Ice and physical therapy alleviated pain.  She is not taking any analgesics.   Achiness can return at the end of the day.  Pt states taht she feels taht her symptoms are being well controlled on plaquenil and that she is not experiencing any side effects.  She states that she would like to remain on the plaquenil.  She is trying to become pregnant again, so she does not want to initiate any medications that are potentially teratogenic.  She states that she doesn't think that she needs a short course of prednisone to deal with her current symptoms    Pt states that in February she had an acute attack of right 1st metatarsophalangeal joint pain.  Pt denies callor, edema, or warmth.  Pt states that the pain is severe to the point that she can't walk on it.  Pain is worse than her normal arthritis pain.  Pt does not drink.  No association with seafood, organ meats.  Pt states that she was eating a lot of red meats during that period.     ROS:  Affirms SI joint and trochanteric pain with 1 hour of morning stiffness.  Denies HA, fevers, chills, night sweats, changes in vision, sicca symptoms, ulcers, epistaxis, lumps and bumps in neck, chest pain, palpitations, SOB, stomach pain, n/v/d, constipation, hematochezia, melena, dysuria, hematuria, weakness of muscles, new rashes, raynaud's.    Today: Had neg HLA-B27 and neg pelvic MRI for sacroiliitis. Her SI joint pain resolved. Reports intermittent B/L shoulder pain and R hip bursitis pain but overall her pain much less and she did not have as much pain and problem with her joints this past winter. After having a miscarriage at 11 wk, now has infertility problem. Reports developing ovarian cyst after taking clomiphene, now is on both metformin and OC, will try fertility meds again after resolution of cysts. She reports having diarrhea on metformin but now it's better.    Her limited rheumatology records were reviewed.    This Document is currently in Final Status  Exam Accession# [id number]     Signs and Symptoms for Radiological Exam from IDX:       * hip  pain  pt states left hip pain no injury  [facility] [id number]  History from IDX:       * ARTHROPATHY, UNSPECIFIED, SITE UNSPECIFIED; PAIN IN JOINT, PELVIC REGION AND THIGH  AP PELVIS AND LATERAL VIEW LEFT HIP 11/05/2012     COMPARISON: AP pelvis 05/11/2006.     FINDINGS: The pelvic ring is intact. Normal alignment of the left hip.  The joint spaces are symmetric. Tiny accessory ossicle at the lateral  aspect of the left acetabular roof. This is stable from the previous  study.     tt        Examination Interpreted at: [place], [place]  The Interpreting Physician is FERNANDO DAVIS  Exam was completed at [facility]  Component      Latest Ref Rng 1/23/2014 1/23/2014          12:00 AM 12:00 AM   Sodium      137 - 145 mmol/L 137    Potassium      3.6 - 5.0 mmol/L 4.4    Chloride      98 - 107 mmol/L 99    CARBON DIOXIDE, TOTAL      22 - 35 mmol/L 26    Anion Gap       12    Glucose      65 - 100 mg/dL 100    Urea Nitrogen      7 - 20 mg/dL 14    Creatinine      0.5 - 1.0 mg/dL 0.7    Calcium      8.4 - 10.2 mg/dL 9.3    Protein Total      6.3 - 8.2 g/dL 7.1    Albumin      3.5 - 5.0 g/dL 4.2    Bilirubin Total      0.2 - 1.3 mg/dL 0.5    Alkaline Phosphatase      38 - 126 U/L 47    AST      14 - 59 U/L 26    ALT      9 - 72 U/L 28    RNP Antibodies      0.0 - 0.9 AI <0.2 <0.2   Kevin Antibodies      0.0 - 0.9 AI <0.2 <0.2   Scleroderma Antibody Scl-70 ANA CRISTINA IgG      0.0 - 0.9 AI  <0.2   Sjogren's Anti-SS-A      0.0 - 0.9 AI  <0.2   Sjogren's Anti-SS-B      0.0 - 0.9 AI  <0.2   Antichromatin Antibodies      0.0 - 0.9 AI  <0.2   Anti-Laura-1      0.0 - 0.9 Al  <0.2   Centomere B Atb      0.0 - 0.9 AI  <0.2   QuantiFERON TB Gold       Neg    QuantiFERON TB Ag Value       0.05    QuantiFERON Nil Value       0.05    QuantiFERON Mitogen Value       >10.00    QFT TB Ag minus Nil Value       0.00    NANCY Screen by EIA       Neg    Hepatitis C PANKAJ      0.0 - 0.9 0.1    Vitamin D,25-Hydroxy      30.0 -  100.0 ng/mL 34.8    HBsAg Screen       Neg    Hepatitis B Core Antibody       Neg    Complement C4      9 - 36 16    Complement C3      90 - 180 153    Rheumatoid Factor      0.0 - 13.9 KIU/L 9.8    C-Reactive Protein      0.0 - 4.9 mg/dL 2.7    Anti-DNA (DS) Ab Qn      0 - 9 IU/mL 9    CCP Antibodies      0 - 19 U/mL 3      Exam: Bilateral wrists, 3 views each. 1/10/2014.    Comparison: None.    Clinical history: Arthropathy.    Findings: 3 views each of the bilateral wrists were obtained. Joint  spaces are well-maintained. No erosive changes are noted. No soft  tissue abnormalities are seen.            Result Impression       Impression: No erosive changes in the bilateral wrists.    AVELINA HIGGINBOTHAM MD  I have personally reviewed the image and initial interpretation, and I  agree with findings.     Exam: Bilateral hands, 3 views each. 1/10/2014.    Comparison: None.    Clinical history: Arthropathy.    Findings: 3 views each of the bilateral hands were obtained. The joint  spaces are well-maintained. No soft tissue abnormalities are noted. No  erosive changes are seen.            Result Impression       Impression: No erosive changes in the bilateral hands.    AVELINA HIGGINBOTHAM MD  I have personally reviewed the image and initial interpretation, and I  agree with findings.     Exam: Sacroiliac joints, 3 views. 1/10/2014.    Comparison: None.    Clinical history: Arthropathy.    Findings: 3 views of the sacroiliac joints were obtained. The  sacroiliac joints are patent. No abnormal sclerosis is noted. No  definite erosive changes are seen.            Result Impression       Impression: No acute bone abnormality in the sacroiliac joints.    AVELINA HIGGINBOTHAM MD  I have personally reviewed the image and initial interpretation, and I  agree with findings.          Component      Latest Ref Rng 2/20/2015   WBC      4.0 - 11.0 10e9/L 11.1 (H)   RBC Count      3.8 - 5.2 10e12/L 4.28   Hemoglobin      11.7 - 15.7 g/dL 13.1    Hematocrit      35.0 - 47.0 % 38.9   MCV      78 - 100 fl 91   MCH      26.5 - 33.0 pg 30.6   MCHC      31.5 - 36.5 g/dL 33.7   RDW      10.0 - 15.0 % 13.1   Platelet Count      150 - 450 10e9/L 238   Diff Method       Automated Method   % Neutrophils       66.4   % Lymphocytes       21.7   % Monocytes       9.6   % Eosinophils       1.5   % Basophils       0.4   % Immature Granulocytes       0.4   Absolute Neutrophil      1.6 - 8.3 10e9/L 7.4   Absolute Lymphocytes      0.8 - 5.3 10e9/L 2.4   Absolute Monoctyes      0.0 - 1.3 10e9/L 1.1   Absolute Eosinophils      0.0 - 0.7 10e9/L 0.2   Absolute Basophils      0.0 - 0.2 10e9/L 0.0   Abs Immature Granulocytes      0 - 0.4 10e9/L 0.0   Creatinine      0.52 - 1.04 mg/dL 0.72   GFR Estimate      >60 mL/min/1.7m2 90   GFR Estimate If Black      >60 mL/min/1.7m2 >90 . . .   CRP Inflammation      0.0 - 8.0 mg/L 3.5   Sed Rate      0 - 20 mm/h 9   Albumin      3.4 - 5.0 g/dL 3.8   ALT      0 - 50 U/L 67 (H)   AST      0 - 45 U/L 28     Component      Latest Ref [facility] 8/21/2015   WBC      4.0 - 11.0 10e9/L 7.9   RBC Count      3.8 - 5.2 10e12/L 4.50   Hemoglobin      11.7 - 15.7 g/dL 13.8   Hematocrit      35.0 - 47.0 % 40.0   MCV      78 - 100 fl 89   MCH      26.5 - 33.0 pg 30.7   MCHC      31.5 - 36.5 g/dL 34.5   RDW      10.0 - 15.0 % 12.6   Platelet Count      150 - 450 10e9/L 273   Diff Method       Automated Method   % Neutrophils       66.4   % Lymphocytes       23.6   % Monocytes       8.0   % Eosinophils       1.3   % Basophils       0.3   % Immature Granulocytes       0.4   Absolute Neutrophil      1.6 - 8.3 10e9/L 5.3   Absolute Lymphocytes      0.8 - 5.3 10e9/L 1.9   Absolute Monoctyes      0.0 - 1.3 10e9/L 0.6   Absolute Eosinophils      0.0 - 0.7 10e9/L 0.1   Absolute Basophils      0.0 - 0.2 10e9/L 0.0   Abs Immature Granulocytes      0 - 0.4 10e9/L 0.0   Creatinine      0.52 - 1.04 mg/dL 0.78   GFR Estimate      >60 mL/min/1.7m2 81   GFR Estimate If Black       >60 mL/min/1.7m2 >90 . . .   [id number] Method       SSOP   B locus       B27 Neg   CRP Inflammation      0.0 - 8.0 mg/L <2.9   Sed Rate      0 - 20 mm/h 11   Albumin      3.4 - 5.0 g/dL 4.2   ALT      0 - 50 U/L 50   AST      0 - 45 U/L 29   HLA B27 Typing       Specimen received - Immunology report to follow upon completion.   Exam: MRI of the sacroiliac joints dated 10/21/2015.  Comparison: 1/10/2014.  Clinical history: Evaluate for sacroiliitis.  Technique: Multiplanar, multisequence MR imaging of the sacroiliac  joints were obtained using standard sequences in 2 orthogonal planes  before and after the uneventful administration of intravenous  gadolinium contrast.  Findings:  No significant fluid in the sacroiliac joints. No abnormal enhancement  to suggest sacroiliitis. No erosive changes are noted.  The muscle bulk is intact without significant atrophy or edema. The  visualized intrapelvic structures are unremarkable. No  lymphadenopathy. No full-thickness tear or tendon retraction.  No abnormal marrow signal to suggest fracture, osteonecrosis, or  marrow infiltration. Nonspecific subtle focus of T2 hyperintensity  within the left iliac bone, coronal series 3 image 10, likely  vascularity.  Large field-of-view limits evaluation the hip joints. No  full-thickness cartilage loss or joint effusion. The visualized lower  lumbar spine is unremarkable.  IMPRESSION  Impression:  1. No findings to suggest sacroiliitis.  2. No abnormal marrow signal to suggest fracture, osteonecrosis, or  marrow infiltration.  AVELINA HIGGINBOTHAM MD  Component      Latest Ref Rng & Units 12/16/2016   Cardiolipin Antibody IgG      0.0 - 19.9 GPL-U/mL 3.2   Cardiolipin Antibody IgM      0.0 - 19.9 MPL-U/mL 27.7 (H)   Cardiolipin Antibody IgA      0.0 - 19.9 APL U/mL 5.7   Beta 2 Glycoprotein 1 Antibody IgA      <7 U/mL 1.6     Component      Latest Ref Rng & Units 10/13/2017   WBC      4.0 - 11.0 10e9/L 7.3   RBC Count      3.8 - 5.2 10e12/L  4.13   Hemoglobin      11.7 - 15.7 g/dL 12.6   Hematocrit      35.0 - 47.0 % 37.5   MCV      78 - 100 fl 91   MCH      26.5 - 33.0 pg 30.5   MCHC      31.5 - 36.5 g/dL 33.6   RDW      10.0 - 15.0 % 11.8   Platelet Count      150 - 450 10e9/L 283   Diff Method       Automated Method   % Neutrophils      % 63.1   % Lymphocytes      % 25.2   % Monocytes      % 9.0   % Eosinophils      % 1.6   % Basophils      % 0.8   % Immature Granulocytes      % 0.3   Nucleated RBCs      0 /100 0   Absolute Neutrophil      1.6 - 8.3 10e9/L 4.6   Absolute Lymphocytes      0.8 - 5.3 10e9/L 1.8   Absolute Monocytes      0.0 - 1.3 10e9/L 0.7   Absolute Eosinophils      0.0 - 0.7 10e9/L 0.1   Absolute Basophils      0.0 - 0.2 10e9/L 0.1   Abs Immature Granulocytes      0 - 0.4 10e9/L 0.0   Absolute Nucleated RBC       0.0   Color Urine       Yellow   Appearance Urine       Slightly Cloudy   Glucose Urine      NEG:Negative mg/dL Negative   Bilirubin Urine      NEG:Negative Negative   Ketones Urine      NEG:Negative mg/dL Negative   Specific Gravity Urine      1.003 - 1.035 1.015   Blood Urine      NEG:Negative Negative   pH Urine      5.0 - 7.0 pH 5.0   Protein Albumin Urine      NEG:Negative mg/dL Negative   Urobilinogen mg/dL      0.0 - 2.0 mg/dL 0.0   Nitrite Urine      NEG:Negative Negative   Leukocyte Esterase Urine      NEG:Negative Small (A)   Source       Midstream Urine   WBC Urine      0 - 2 /HPF 2   RBC Urine      0 - 2 /HPF 1   Bacteria Urine      NEG:Negative /HPF Few (A)   Squamous Epithelial /HPF Urine      0 - 1 /HPF 4 (H)   Mucous Urine      NEG:Negative /LPF Present (A)   Creatinine      0.52 - 1.04 mg/dL 0.91   GFR Estimate      >60 mL/min/1.7m2 67   GFR Estimate If Black      >60 mL/min/1.7m2 82   Cardiolipin Antibody IgG      0.0 - 19.9 GPL-U/mL 3.5   Cardiolipin Antibody IgM      0.0 - 19.9 MPL-U/mL 27.0 (H)   Protein Random Urine      g/L 0.18   Protein Total Urine g/gr Creatinine      0 - 0.2 g/g Cr 0.15   NANCY  interpretation      NEG:Negative Negative   NANCY titer 1       1:40   Lupus Result      NEG:Negative Negative   Beta 2 Glycoprotein 1 Antibody IgM      <7 U/mL 8.9 (H)   Beta 2 Glycoprotein 1 Antibody IgG      <7 U/mL <0.6   AST      0 - 45 U/L 19   ALT      0 - 50 U/L 35   Albumin      3.4 - 5.0 g/dL 4.0   Complement C4      15 - 50 mg/dL 19   Complement C3      76 - 169 mg/dL 131   CRP Inflammation      0.0 - 8.0 mg/L <2.9   Sed Rate      0 - 20 mm/h 18   DNA-ds      <10 IU/mL 4   Creatinine Urine      mg/dL 124     HISTORY REVIEW:  Past Medical History:   Diagnosis Date     Anxiety 2013     Asthma      Chronic sinusitis 2018     Hypertension      Inflammatory arthritis      Past Surgical History:   Procedure Laterality Date     CHOLECYSTECTOMY       GALLBLADDER SURGERY       GYN SURGERY  2018    both my Fallopian tube, and right ovary, and appendix     HYSTERECTOMY Right     Partial- right ovary removed     TONSILLECTOMY       TONSILLECTOMY  1993     Family History   Problem Relation Age of Onset     Lupus Paternal Aunt         father had double lung transplant for alpha 1 anti-trypsin def     Arthritis Maternal Grandmother         RA     Hypertension Maternal Grandmother      Depression Maternal Grandmother      Arthritis Paternal Grandfather         RA     Family History Negative Other         neg for AS, psoriasis     Gastrointestinal Disease Other         cousin has colitis     Diabetes Father         due to transplant     Allergies Father      Diabetes Other         Aunts on both sides     Hypertension Mother      Allergies Mother      Hypertension Maternal Grandfather      Arthritis Maternal Grandfather      Arthritis Paternal Grandmother      Other - See Comments Other         fibromyalgia - paternal aunt     Diabetes Other      Social History     Socioeconomic History     Marital status:      Spouse name: Not on file     Number of children: 0     Years of education: Not on file     Highest education  level: Not on file   Occupational History     Occupation:      Employer: DNR   Social Needs     Financial resource strain: Not on file     Food insecurity:     Worry: Not on file     Inability: Not on file     Transportation needs:     Medical: Not on file     Non-medical: Not on file   Tobacco Use     Smoking status: Never Smoker     Smokeless tobacco: Never Used   Substance and Sexual Activity     Alcohol use: Yes     Comment: occ     Drug use: No     Sexual activity: Yes     Partners: Male     Birth control/protection: None   Lifestyle     Physical activity:     Days per week: Not on file     Minutes per session: Not on file     Stress: Not on file   Relationships     Social connections:     Talks on phone: Not on file     Gets together: Not on file     Attends [employer] service: Not on file     Active member of club or organization: Not on file     Attends meetings of clubs or organizations: Not on file     Relationship status: Not on file     Intimate partner violence:     Fear of current or ex partner: Not on file     Emotionally abused: Not on file     Physically abused: Not on file     Forced sexual activity: Not on file   Other Topics Concern     Not on file   Social History Narrative    2019        ENVIRONMENTAL HISTORY: [place] home is heated with a gas furnace and infloor heating. They do have central air conditioning. The patient's bedroom is furnished with carpeting in bedroom and fabric window coverings.  No pets inside the house. There is no history of cockroach or mice infestation. There are no smokers in the house.  The house does not have a basement.        PMHx, FHx, SHx were reviewed, unchanged.    Pregnancy Hx: She is  s/p one miscarriage around 12 wk    Outpatient Encounter Medications as of 10/11/2019   Medication Sig Dispense Refill     albuterol (VENTOLIN HFA) 108 (90 BASE) MCG/ACT inhaler Inhale 2 Puffs into the  lungs four times a day as needed for Wheezing or Shortness of Breath (as needed). Shake before using.       BUDESONIDE NA        escitalopram (LEXAPRO) 10 MG tablet Take 10 mg by mouth daily  0     hydroxychloroquine (PLAQUENIL) 200 MG tablet Take 1 tablet (200 mg) by mouth 2 times daily 180 tablet 1     hydrOXYzine (ATARAX) 25 MG tablet Take 25 mg by mouth every 8 hours as needed for itching       labetalol (NORMODYNE) 200 MG tablet Take 100 mg by mouth 2 times daily       metFORMIN (GLUCOPHAGE) 500 MG tablet        montelukast (SINGULAIR) 10 MG tablet Take 1 tablet by mouth daily  3     Prenatal Multivit-Min-Fe-FA (PRENATAL VITAMINS PO) With iron       tiZANidine (ZANAFLEX) 4 MG tablet Take 4 mg by mouth 3 times daily       traMADol (ULTRAM) 50 MG tablet Take 50 mg by mouth       VITAMIN D, CHOLECALCIFEROL, PO Take 2,000 Units by mouth daily       budesonide (RINOCORT AQUA) 32 MCG/ACT nasal spray Spray 1 spray into both nostrils daily (Patient not taking: Reported on 10/11/2019) 8.6 Bottle 3     cetirizine (ZYRTEC) 10 MG tablet Take 10 mg by mouth as needed for allergies       fluticasone (FLOVENT HFA) 110 MCG/ACT inhaler Inhale 2 Puffs into the lungs two times a day. 4 puffs twice daily in yellow zone       norethindrone-ethinyl estradiol-iron (ESTROSTEP FE) 1-20/1-30/1-35 MG-MCG per tablet Take 1 tablet by mouth daily       ORILISSA 200 MG TABS TAKE 200 MG BY MOUTH TWO TIMES A DAY.  5     Facility-Administered Encounter Medications as of 10/11/2019   Medication Dose Route Frequency Provider Last Rate Last Dose     influenza quadrivalent (PF) vacc (FLUZONE) injection 0.5 mL  0.5 mL Intramuscular Prior to discharge Marie Charles MD         Allergies   Allergen Reactions     Tetanus Immune Globulin      Fever     Tetanus Toxoid            Ph.E:    Vitals:    10/11/19 1038   BP: 138/59   Pulse: 73   Temp: 97.5  F (36.4  C)   TempSrc: Oral   Weight: 71.6 kg (157 lb 14.4 oz)   Height: 1.524 m (5')        Constitutional: WD/WN. Pleasant. In no acute distress.   Eyes: EOM intact, PERRLA, sclera anicteric, conj not injected  HEENT: No oral ulcers or thrush. Normal salivary pool.  Neck: No cervical LAP  Chest: Clear to auscultation bilaterally  CV: RRR, no murmurs/ rubs or gallops. No edema, clubbing or cyanosis.   GI: Abdomen is soft and non tender.     MS: no joint tenderness, tenderness over R deltoid. No active synovitis. No joint deformities.  No nodules. +FM TP  Skin: No skin rash, malar rash, livedo, periungual erythema,digital ulcers or nail changes. + thinning of hair (allopecia).  Neuro: A&O x 3. 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 Received MRI report of L index finger 4/06  which showed L index finger edema from PIP to DIP (non specific finding). Her work up at initial visit in 1/2014 was suggestive of seroneg non-erosive IA. She was recommended to increase lodine to 2 tab a day but could not tolerate it sec GI upset. Has FM TP but FMS can't explain all her pain including pain/tenderness/swelling over PIP joints, AM stiffness> 1hr and good response to steroid/SSZ in the past. For AI, recommended a trial of HCQ. She is on HCQ since 3/2014 with excellent response. Her IA is under excellent control on HCQ. R shoulder pain is due to bursitis.    -neg HLA-B27 and neg pelvic MRI 10/2015 for sacroiliitis, SI joint pain resolved. Now has intermittent low back and shoulder pain, which is most likely due to FMS, seeing a chiropractor helps.    - continue plaquenil 200mg PO BID, was informed that's safe in pregnancy    -Recommend eye exam for HCQ monitoring. Eye exam is updated.    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 She was put on ASA+lovenox during IVF. She failed IVF fresh embryo (two) and leftover frozen embryo (one) transfer and was told given her age her best option would be to use donor embryo and now is on waiting list, hopes to get one soon. She is recommended to use ASA 81 mg qd+lovenox during future pregnancy with donor embryo and I agree given her h/o miscarriage at 12 wk. She has no h/o thrombosis. She still does not meet criteria for APS syndrome as miscarriage was still considered 1st trimester miscarriage.    Going to have embryo transfer on 10/29.    She always had neg NANCY here and no features of SLE, I will re-check NANCY, lupus markers and anti-DNA.    Flu shot    Labs today    [facility] arthritis for R shoulder pain    Refer to PT for R shoulder bursitis      Return in 6 months        MEDICATION CHANGES: None.    Orders Placed This Encounter   Procedures     Vitamin D Deficiency     SSA Ro ANA CRISTINA Antibody IgG     SSB La ANA CRISTINA Antibody IgG     Anti Nuclear Edith IgG by IFA with Reflex     DNA double stranded antibodies     Complement C3     Complement C4     PHYSICAL THERAPY REFERRAL                 HPI      ROS      Physical Exam        Marie Charles MD

## 2019-10-11 NOTE — PATIENT INSTRUCTIONS
Flu shot    Labs today    [facility] arthritis for R shoulder pain    Refer to PT for R shoulder bursitis    Good luck with pregnancy!    Return in 6 months

## 2019-10-11 NOTE — LETTER
10/11/2019       RE: Gwendolyn Melgar  [address] [place] [address]     Dear Colleague,    Thank you for referring your patient, Gwendolyn Melgar, to the [facility] RHEUMATOLOGY at [facility]. Please see a copy of my visit note below.    Rheumatology F/U Note    Date of last visit: 10/12/2018  Today's visit date: 10/11/2019    Reason for visit: Seronegative non-erosive inflammatory arthritis        HPI from initial visit    Gwendolyn Melgar is a 40 yo WF who was referred to our clinic for evaluation and management of her IA.    On 1/30/2006, she woke up with pain/swelling of L 2nd finger. Was treated with prednisone and reportedly L hand MRI showed inflammatory arthritis. She had neg HLA-B27, NANCY, RF and anti-CCP in 2006. She was referred to rheumatology and was put on SSZ (? dose, ?2-3 tab twice a day). She was able to make a full fist and her joint sx improved. She was on that x 6 months and was told to stop it. Her arthritis was under good control x 3 yr.     In 2010, started having low back pain. She had problem with low back pain and getting out of the chair. At that time, had hand AM stiffness in AM to the point that could not open her hands. She was put on lodine. It helped her. Was told to taper the dose from 2 a day to 1 a day.    Last summer, they bought a new house and her father had double lung transplant. She started having low back pain in 10/2013, had severe pain to the point that she could not get off the floor without help. She was prescribed prednisone 20 max over 20 days. It helped her. Had unremarkable L hip x-ray. Never had SI joint x-ray or MRI.    Was recommended to start anti-TNF Tx but she is concerned about side effects and is seeking second opinion.      Reports fatigue. Currently has a cold with dry cough. Has h/o alopecia areata, has thin hair. Has occasional diarrhea sec anxiety, anxiety on lexapro, myalgia, occasional numbness/tingling ta night in hands  which wakes her up at night.    Today, has pain around shoulders and hips which could be sec sitting at desc and cold.    Interval hx: Her extensive rheumatologic work up at initial visit was negative. I diagnosed her with seroneg non-erosive IA and advised her to increase lodine from 1 to 2 a day which she did but could not tolerate it a sit caused GI upset. She had pain/tenderness/swelling of PIP joints with AM stiffness> 1 hr. Prescribed her  mg bid in 3/2014, she reports significant improvement of joint sx on it. Tolerates it well. Was also put her on cymbalta at the same time helped with her mood and back pain. She stopped cymbalta as had pregnancy plan, reports occasional LBP off cymbalta. Had leg cramps at last visit, zanaflex was prescribed but leg cramps resolved on its won and she did not try the zanaflex. R hip bursitis improved after doing PT.    She is feeling well today. Just found out to be pregnant on Maddox's day. She is 6 wk pregnant, is excited about it. Her TG is 10/17/2015. She is due for eye exam in 9/2015. She is still taking the HCQ and is helping, no joint pain/swelling or AM stiffness. Had pain over R foot on walking about 6 wk ago when she was in [place], it eventually resolved on its own.        12/2016: Doing well with no joint pain or arthritis flare. Now is going through IVF, had one round in 11/2016 which was not successful, now is going to do another round in January 2017. She developed R sciatica pain after 1st round of IVF which resolved after working with chiropractor. 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 I don't have her test results, she thinks they were tested only once.      10/2017: No arthritis flare up since last visit, doing well. Has ongoing aching around shoulders and low back which is intermittent, started to see a  chiropractor and it's helping. She failed IVF, one fresh embryo (x2 embryos) transfer and one frozen embryo transfer. Now is on waiting list to get donor embryo, is hoping to get one by 2018. 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 Her OB believes that + APS antibodies might explain the fetal loss at 12 wk when Gwendolyn got pregnant without assistance; however it was never confirmed, no genetic testing was done on fetus.    She reports no recurrence of hand stiffness or pain since she started taking HCQ.    10/2018: 2wk ago, started to hurt over R shoulder pain and L 2nd finger pain. Otherwise had a good yr. Taking a hot bath helps. Tylenol or ibuprofen helps. zanaflex helps with leg cramps and pain.    Sometimes gets canker sores.    No major fatigue.    Overall doing fairly well with no major arthritis flare up on HCQ, waiting for donor embryo. On 8/3/2018, had surgery for endometriosis, it was converted to open laparotomy, right salpingo-oophorectomy. Appendectomy. Left salpingectomy      Today 10/11/2019: R shoulder was hurting last winter, got better and now the pain is back. When it gets cold, her 5th fingers hurt. R 5th PIP hurt. No joint swelling.     Hip pain is good.    She does stretching exercises.    Now goes to walking track at high school and also does bowling.    Last eye exam was on 10/4/2019.    In the process of getting donated embryo transferred (10/29/2019), excited about it.     ROS:  A comprehensive ROS was done, positives are per HPI.      Interval History (2015)  Pt states that she had a spontaneous  at approximately 12 weeks.  Since that time she has had a recurrence of her bilateral lower hip and back pain that started approximately 1-2 months after the miscarriage occurred.  Morning stiffness duration varies with activity and achiness  that lasts for approximately on hour. Pt states that he rpain is salenay a 1-2/10 at its maximum, 3-4/10 at maximum.  Ice and physical therapy alleviated pain.  She is not taking any analgesics.  Achiness can return at the end of the day.  Pt states taht she feels taht her symptoms are being well controlled on plaquenil and that she is not experiencing any side effects.  She states that she would like to remain on the plaquenil.  She is trying to become pregnant again, so she does not want to initiate any medications that are potentially teratogenic.  She states that she doesn't think that she needs a short course of prednisone to deal with her current symptoms    Pt states that in February she had an acute attack of right 1st metatarsophalangeal joint pain.  Pt denies callor, edema, or warmth.  Pt states that the pain is severe to the point that she can't walk on it.  Pain is worse than her normal arthritis pain.  Pt does not drink.  No association with seafood, organ meats.  Pt states that she was eating a lot of red meats during that period.     ROS:  Affirms SI joint and trochanteric pain with 1 hour of morning stiffness.  Denies HA, fevers, chills, night sweats, changes in vision, sicca symptoms, ulcers, epistaxis, lumps and bumps in neck, chest pain, palpitations, SOB, stomach pain, n/v/d, constipation, hematochezia, melena, dysuria, hematuria, weakness of muscles, new rashes, raynaud's.    Today: Had neg HLA-B27 and neg pelvic MRI for sacroiliitis. Her SI joint pain resolved. Reports intermittent B/L shoulder pain and R hip bursitis pain but overall her pain much less and she did not have as much pain and problem with her joints this past winter. After having a miscarriage at 11 wk, now has infertility problem. Reports developing ovarian cyst after taking clomiphene, now is on both metformin and OC, will try fertility meds again after resolution of cysts. She reports having diarrhea on metformin but now  it's better.    Her limited rheumatology records were reviewed.    This Document is currently in Final Status  Exam Accession# [id number]     Signs and Symptoms for Radiological Exam from IDX:       * hip pain  pt states left hip pain no injury  [id number]  History from IDX:       * ARTHROPATHY, UNSPECIFIED, SITE UNSPECIFIED; PAIN IN JOINT, PELVIC REGION AND THIGH  AP PELVIS AND LATERAL VIEW LEFT HIP 11/05/2012     COMPARISON: AP pelvis 05/11/2006.     FINDINGS: The pelvic ring is intact. Normal alignment of the left hip.  The joint spaces are symmetric. Tiny accessory ossicle at the lateral  aspect of the left acetabular roof. This is stable from the previous  study.     tt        Examination Interpreted at: [place], [place]  The Interpreting Physician is FERNANDO DAVIS  Exam was completed at [facility]  Component      Latest Ref Rng 1/23/2014 1/23/2014          12:00 AM 12:00 AM   Sodium      137 - 145 mmol/L 137    Potassium      3.6 - 5.0 mmol/L 4.4    Chloride      98 - 107 mmol/L 99    CARBON DIOXIDE, TOTAL      22 - 35 mmol/L 26    Anion Gap       12    Glucose      65 - 100 mg/dL 100    Urea Nitrogen      7 - 20 mg/dL 14    Creatinine      0.5 - 1.0 mg/dL 0.7    Calcium      8.4 - 10.2 mg/dL 9.3    Protein Total      6.3 - 8.2 g/dL 7.1    Albumin      3.5 - 5.0 g/dL 4.2    Bilirubin Total      0.2 - 1.3 mg/dL 0.5    Alkaline Phosphatase      38 - 126 U/L 47    AST      14 - 59 U/L 26    ALT      9 - 72 U/L 28    RNP Antibodies      0.0 - 0.9 AI <0.2 <0.2   Kevin Antibodies      0.0 - 0.9 AI <0.2 <0.2   Scleroderma Antibody Scl-70 ANA CRISTINA IgG      0.0 - 0.9 AI  <0.2   Sjogren's Anti-SS-A      0.0 - 0.9 AI  <0.2   Sjogren's Anti-SS-B      0.0 - 0.9 AI  <0.2   Antichromatin Antibodies      0.0 - 0.9 AI  <0.2   Anti-Laura-1      0.0 - 0.9 Al  <0.2   Centomere B Atb      0.0 - 0.9 AI  <0.2   QuantiFERON TB Gold       Neg    QuantiFERON TB Ag Value       0.05    QuantiFERON Nil  Value       0.05    QuantiFERON Mitogen Value       >10.00    QFT TB Ag minus Nil Value       0.00    NANCY Screen by EIA       Neg    Hepatitis C PANKAJ      0.0 - 0.9 0.1    Vitamin D,25-Hydroxy      30.0 - 100.0 ng/mL 34.8    HBsAg Screen       Neg    Hepatitis B Core Antibody       Neg    Complement C4      9 - 36 16    Complement C3      90 - 180 153    Rheumatoid Factor      0.0 - 13.9 KIU/L 9.8    C-Reactive Protein      0.0 - 4.9 mg/dL 2.7    Anti-DNA (DS) Ab Qn      0 - 9 IU/mL 9    CCP Antibodies      0 - 19 U/mL 3      Exam: Bilateral wrists, 3 views each. 1/10/2014.    Comparison: None.    Clinical history: Arthropathy.    Findings: 3 views each of the bilateral wrists were obtained. Joint  spaces are well-maintained. No erosive changes are noted. No soft  tissue abnormalities are seen.            Result Impression       Impression: No erosive changes in the bilateral wrists.    AVELINA HIGGINBOTHAM MD  I have personally reviewed the image and initial interpretation, and I  agree with findings.     Exam: Bilateral hands, 3 views each. 1/10/2014.    Comparison: None.    Clinical history: Arthropathy.    Findings: 3 views each of the bilateral hands were obtained. The joint  spaces are well-maintained. No soft tissue abnormalities are noted. No  erosive changes are seen.            Result Impression       Impression: No erosive changes in the bilateral hands.    AVELINA HIGGINBOTHAM MD  I have personally reviewed the image and initial interpretation, and I  agree with findings.     Exam: Sacroiliac joints, 3 views. 1/10/2014.    Comparison: None.    Clinical history: Arthropathy.    Findings: 3 views of the sacroiliac joints were obtained. The  sacroiliac joints are patent. No abnormal sclerosis is noted. No  definite erosive changes are seen.            Result Impression       Impression: No acute bone abnormality in the sacroiliac joints.    AVELINA HIGGINBOTHAM MD  I have personally reviewed the image and initial  interpretation, and I  agree with findings.          Component      Latest Ref Rng 2/20/2015   WBC      4.0 - 11.0 10e9/L 11.1 (H)   RBC Count      3.8 - 5.2 10e12/L 4.28   Hemoglobin      11.7 - 15.7 g/dL 13.1   Hematocrit      35.0 - 47.0 % 38.9   MCV      78 - 100 fl 91   MCH      26.5 - 33.0 pg 30.6   MCHC      31.5 - 36.5 g/dL 33.7   RDW      10.0 - 15.0 % 13.1   Platelet Count      150 - 450 10e9/L 238   Diff Method       Automated Method   % Neutrophils       66.4   % Lymphocytes       21.7   % Monocytes       9.6   % Eosinophils       1.5   % Basophils       0.4   % Immature Granulocytes       0.4   Absolute Neutrophil      1.6 - 8.3 10e9/L 7.4   Absolute Lymphocytes      0.8 - 5.3 10e9/L 2.4   Absolute Monoctyes      0.0 - 1.3 10e9/L 1.1   Absolute Eosinophils      0.0 - 0.7 10e9/L 0.2   Absolute Basophils      0.0 - 0.2 10e9/L 0.0   Abs Immature Granulocytes      0 - 0.4 10e9/L 0.0   Creatinine      0.52 - 1.04 mg/dL 0.72   GFR Estimate      >60 mL/min/1.7m2 90   GFR Estimate If Black      >60 mL/min/1.7m2 >90 . . .   CRP Inflammation      0.0 - 8.0 mg/L 3.5   Sed Rate      0 - 20 mm/h 9   Albumin      3.4 - 5.0 g/dL 3.8   ALT      0 - 50 U/L 67 (H)   AST      0 - 45 U/L 28     Component      Latest Ref Rng 8/21/2015   WBC      4.0 - 11.0 10e9/L 7.9   RBC Count      3.8 - 5.2 10e12/L 4.50   Hemoglobin      11.7 - 15.7 g/dL 13.8   Hematocrit      35.0 - 47.0 % 40.0   MCV      78 - 100 fl 89   MCH      26.5 - 33.0 pg 30.7   MCHC      31.5 - 36.5 g/dL 34.5   RDW      10.0 - 15.0 % 12.6   Platelet Count      150 - 450 10e9/L 273   Diff Method       Automated Method   % Neutrophils       66.4   % Lymphocytes       23.6   % Monocytes       8.0   % Eosinophils       1.3   % Basophils       0.3   % Immature Granulocytes       0.4   Absolute Neutrophil      1.6 - 8.3 10e9/L 5.3   Absolute Lymphocytes      0.8 - 5.3 10e9/L 1.9   Absolute Monoctyes      0.0 - 1.3 10e9/L 0.6   Absolute Eosinophils      0.0 - 0.7  10e9/L 0.1   Absolute Basophils      0.0 - 0.2 10e9/L 0.0   Abs Immature Granulocytes      0 - 0.4 10e9/L 0.0   Creatinine      0.52 - 1.04 mg/dL 0.78   GFR Estimate      >60 mL/min/1.7m2 81   GFR Estimate If Black      >60 mL/min/1.7m2 >90 . . .   [id number] Method       SSOP   B locus       B27 Neg   CRP Inflammation      0.0 - 8.0 mg/L <2.9   Sed Rate      0 - 20 mm/h 11   Albumin      3.4 - 5.0 g/dL 4.2   ALT      0 - 50 U/L 50   AST      0 - 45 U/L 29   HLA B27 Typing       Specimen received - Immunology report to follow upon completion.   Exam: MRI of the sacroiliac joints dated 10/21/2015.  Comparison: 1/10/2014.  Clinical history: Evaluate for sacroiliitis.  Technique: Multiplanar, multisequence MR imaging of the sacroiliac  joints were obtained using standard sequences in 2 orthogonal planes  before and after the uneventful administration of intravenous  gadolinium contrast.  Findings:  No significant fluid in the sacroiliac joints. No abnormal enhancement  to suggest sacroiliitis. No erosive changes are noted.  The muscle bulk is intact without significant atrophy or edema. The  visualized intrapelvic structures are unremarkable. No  lymphadenopathy. No full-thickness tear or tendon retraction.  No abnormal marrow signal to suggest fracture, osteonecrosis, or  marrow infiltration. Nonspecific subtle focus of T2 hyperintensity  within the left iliac bone, coronal series 3 image 10, likely  vascularity.  Large field-of-view limits evaluation the hip joints. No  full-thickness cartilage loss or joint effusion. The visualized lower  lumbar spine is unremarkable.  IMPRESSION  Impression:  1. No findings to suggest sacroiliitis.  2. No abnormal marrow signal to suggest fracture, osteonecrosis, or  marrow infiltration.  AVELINA HIGGINBOTHAM MD  Component      Latest Ref Rng & Units 12/16/2016   Cardiolipin Antibody IgG      0.0 - 19.9 GPL-U/mL 3.2   Cardiolipin Antibody IgM      0.0 - 19.9 MPL-U/mL 27.7 (H)   Cardiolipin  Antibody IgA      0.0 - 19.9 APL U/mL 5.7   Beta 2 Glycoprotein 1 Antibody IgA      <7 U/mL 1.6     Component      Latest Ref Rng & Units 10/13/2017   WBC      4.0 - 11.0 10e9/L 7.3   RBC Count      3.8 - 5.2 10e12/L 4.13   Hemoglobin      11.7 - 15.7 g/dL 12.6   Hematocrit      35.0 - 47.0 % 37.5   MCV      78 - 100 fl 91   MCH      26.5 - 33.0 pg 30.5   MCHC      31.5 - 36.5 g/dL 33.6   RDW      10.0 - 15.0 % 11.8   Platelet Count      150 - 450 10e9/L 283   Diff Method       Automated Method   % Neutrophils      % 63.1   % Lymphocytes      % 25.2   % Monocytes      % 9.0   % Eosinophils      % 1.6   % Basophils      % 0.8   % Immature Granulocytes      % 0.3   Nucleated RBCs      0 /100 0   Absolute Neutrophil      1.6 - 8.3 10e9/L 4.6   Absolute Lymphocytes      0.8 - 5.3 10e9/L 1.8   Absolute Monocytes      0.0 - 1.3 10e9/L 0.7   Absolute Eosinophils      0.0 - 0.7 10e9/L 0.1   Absolute Basophils      0.0 - 0.2 10e9/L 0.1   Abs Immature Granulocytes      0 - 0.4 10e9/L 0.0   Absolute Nucleated RBC       0.0   Color Urine       Yellow   Appearance Urine       Slightly Cloudy   Glucose Urine      NEG:Negative mg/dL Negative   Bilirubin Urine      NEG:Negative Negative   Ketones Urine      NEG:Negative mg/dL Negative   Specific Gravity Urine      1.003 - 1.035 1.015   Blood Urine      NEG:Negative Negative   pH Urine      5.0 - 7.0 pH 5.0   Protein Albumin Urine      NEG:Negative mg/dL Negative   Urobilinogen mg/dL      0.0 - 2.0 mg/dL 0.0   Nitrite Urine      NEG:Negative Negative   Leukocyte Esterase Urine      NEG:Negative Small (A)   Source       Midstream Urine   WBC Urine      0 - 2 /HPF 2   RBC Urine      0 - 2 /HPF 1   Bacteria Urine      NEG:Negative /HPF Few (A)   Squamous Epithelial /HPF Urine      0 - 1 /HPF 4 (H)   Mucous Urine      NEG:Negative /LPF Present (A)   Creatinine      0.52 - 1.04 mg/dL 0.91   GFR Estimate      >60 mL/min/1.7m2 67   GFR Estimate If Black      >60 mL/min/1.7m2 82    Cardiolipin Antibody IgG      0.0 - 19.9 GPL-U/mL 3.5   Cardiolipin Antibody IgM      0.0 - 19.9 MPL-U/mL 27.0 (H)   Protein Random Urine      g/L 0.18   Protein Total Urine g/gr Creatinine      0 - 0.2 g/g Cr 0.15   NANCY interpretation      NEG:Negative Negative   NANCY titer 1       1:40   Lupus Result      NEG:Negative Negative   Beta 2 Glycoprotein 1 Antibody IgM      <7 U/mL 8.9 (H)   Beta 2 Glycoprotein 1 Antibody IgG      <7 U/mL <0.6   AST      0 - 45 U/L 19   ALT      0 - 50 U/L 35   Albumin      3.4 - 5.0 g/dL 4.0   Complement C4      15 - 50 mg/dL 19   Complement C3      76 - 169 mg/dL 131   CRP Inflammation      0.0 - 8.0 mg/L <2.9   Sed Rate      0 - 20 mm/h 18   DNA-ds      <10 IU/mL 4   Creatinine Urine      mg/dL 124     HISTORY REVIEW:  Past Medical History:   Diagnosis Date     Anxiety 2013     Asthma      Chronic sinusitis 2018     Hypertension      Inflammatory arthritis      Past Surgical History:   Procedure Laterality Date     CHOLECYSTECTOMY       GALLBLADDER SURGERY       GYN SURGERY  2018    both my Fallopian tube, and right ovary, and appendix     HYSTERECTOMY Right     Partial- right ovary removed     TONSILLECTOMY       TONSILLECTOMY  1993     Family History   Problem Relation Age of Onset     Lupus Paternal Aunt         father had double lung transplant for alpha 1 anti-trypsin def     Arthritis Maternal Grandmother         RA     Hypertension Maternal Grandmother      Depression Maternal Grandmother      Arthritis Paternal Grandfather         RA     Family History Negative Other         neg for AS, psoriasis     Gastrointestinal Disease Other         cousin has colitis     Diabetes Father         due to transplant     Allergies Father      Diabetes Other         Aunts on both sides     Hypertension Mother      Allergies Mother      Hypertension Maternal Grandfather      Arthritis Maternal Grandfather      Arthritis Paternal Grandmother      Other - See Comments Other          fibromyalgia - paternal aunt     Diabetes Other      Social History     Socioeconomic History     Marital status:      Spouse name: Not on file     Number of children: 0     Years of education: Not on file     Highest education level: Not on file   Occupational History     Occupation:      Employer: DNR   Social Needs     Financial resource strain: Not on file     Food insecurity:     Worry: Not on file     Inability: Not on file     Transportation needs:     Medical: Not on file     Non-medical: Not on file   Tobacco Use     Smoking status: Never Smoker     Smokeless tobacco: Never Used   Substance and Sexual Activity     Alcohol use: Yes     Comment: occ     Drug use: No     Sexual activity: Yes     Partners: Male     Birth control/protection: None   Lifestyle     Physical activity:     Days per week: Not on file     Minutes per session: Not on file     Stress: Not on file   Relationships     Social connections:     Talks on phone: Not on file     Gets together: Not on file     Attends Taoism service: Not on file     Active member of club or organization: Not on file     Attends meetings of clubs or organizations: Not on file     Relationship status: Not on file     Intimate partner violence:     Fear of current or ex partner: Not on file     Emotionally abused: Not on file     Physically abused: Not on file     Forced sexual activity: Not on file   Other Topics Concern     Not on file   Social History Narrative    August 12, 2019        ENVIRONMENTAL HISTORY: [place] home is heated with a gas furnace and infloor heating. They do have central air conditioning. The patient's bedroom is furnished with carpeting in bedroom and fabric window coverings.  No pets inside the house. There is no history of cockroach or mice infestation. There are no smokers in the house.  The house does not have a basement.        PMHx, FHx, SHx were reviewed,  unchanged.    Pregnancy Hx: She is  s/p one miscarriage around 12 wk    Outpatient Encounter Medications as of 10/11/2019   Medication Sig Dispense Refill     albuterol (VENTOLIN HFA) 108 (90 BASE) MCG/ACT inhaler Inhale 2 Puffs into the lungs four times a day as needed for Wheezing or Shortness of Breath (as needed). Shake before using.       BUDESONIDE NA        escitalopram (LEXAPRO) 10 MG tablet Take 10 mg by mouth daily  0     hydroxychloroquine (PLAQUENIL) 200 MG tablet Take 1 tablet (200 mg) by mouth 2 times daily 180 tablet 1     hydrOXYzine (ATARAX) 25 MG tablet Take 25 mg by mouth every 8 hours as needed for itching       labetalol (NORMODYNE) 200 MG tablet Take 100 mg by mouth 2 times daily       metFORMIN (GLUCOPHAGE) 500 MG tablet        montelukast (SINGULAIR) 10 MG tablet Take 1 tablet by mouth daily  3     Prenatal Multivit-Min-Fe-FA (PRENATAL VITAMINS PO) With iron       tiZANidine (ZANAFLEX) 4 MG tablet Take 4 mg by mouth 3 times daily       traMADol (ULTRAM) 50 MG tablet Take 50 mg by mouth       VITAMIN D, CHOLECALCIFEROL, PO Take 2,000 Units by mouth daily       budesonide (RINOCORT AQUA) 32 MCG/ACT nasal spray Spray 1 spray into both nostrils daily (Patient not taking: Reported on 10/11/2019) 8.6 Bottle 3     cetirizine (ZYRTEC) 10 MG tablet Take 10 mg by mouth as needed for allergies       fluticasone (FLOVENT HFA) 110 MCG/ACT inhaler Inhale 2 Puffs into the lungs two times a day. 4 puffs twice daily in yellow zone       norethindrone-ethinyl estradiol-iron (ESTROSTEP FE) 1-20/1-30/1-35 MG-MCG per tablet Take 1 tablet by mouth daily       ORILISSA 200 MG TABS TAKE 200 MG BY MOUTH TWO TIMES A DAY.  5     Facility-Administered Encounter Medications as of 10/11/2019   Medication Dose Route Frequency Provider Last Rate Last Dose     influenza quadrivalent (PF) vacc (FLUZONE) injection 0.5 mL  0.5 mL Intramuscular Prior to discharge Marie Charles MD         Allergies   Allergen Reactions      Tetanus Immune Globulin      Fever     Tetanus Toxoid            Ph.E:    Vitals:    10/11/19 1038   BP: 138/59   Pulse: 73   Temp: 97.5  F (36.4  C)   TempSrc: Oral   Weight: 71.6 kg (157 lb 14.4 oz)   Height: 1.524 m (5')       Constitutional: WD/WN. Pleasant. In no acute distress.   Eyes: EOM intact, PERRLA, sclera anicteric, conj not injected  HEENT: No oral ulcers or thrush. Normal salivary pool.  Neck: No cervical LAP  Chest: Clear to auscultation bilaterally  CV: RRR, no murmurs/ rubs or gallops. No edema, clubbing or cyanosis.   GI: Abdomen is soft and non tender.     MS: no joint tenderness, tenderness over R deltoid. No active synovitis. No joint deformities.  No nodules. +FM TP  Skin: No skin rash, malar rash, livedo, periungual erythema,digital ulcers or nail changes. + thinning of hair (allopecia).  Neuro: A&O x 3. 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 Received MRI report of L index finger 4/06  which showed L index finger edema from PIP to DIP (non specific finding). Her work up at initial visit in 1/2014 was suggestive of seroneg non-erosive IA. She was recommended to increase lodine to 2 tab a day but could not tolerate it sec GI upset. Has FM TP but FMS can't explain all her pain including pain/tenderness/swelling over PIP joints, AM stiffness> 1hr and good response to steroid/SSZ in the past. For AI, recommended a trial of HCQ. She is on HCQ since 3/2014 with excellent response. Her IA is under excellent control on HCQ. R shoulder pain is due to bursitis.    -neg HLA-B27 and neg pelvic MRI 10/2015 for sacroiliitis, SI joint pain resolved. Now has intermittent low back and shoulder pain, which is most likely due to FMS, seeing a chiropractor helps.    - continue plaquenil 200mg  PO BID, was informed that's safe in pregnancy    -Recommend eye exam for HCQ monitoring. Eye exam is updated.    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 She was put on ASA+lovenox during IVF. She failed IVF fresh embryo (two) and leftover frozen embryo (one) transfer and was told given her age her best option would be to use donor embryo and now is on waiting list, hopes to get one soon. She is recommended to use ASA 81 mg qd+lovenox during future pregnancy with donor embryo and I agree given her h/o miscarriage at 12 wk. She has no h/o thrombosis. She still does not meet criteria for APS syndrome as miscarriage was still considered 1st trimester miscarriage.    Going to have embryo transfer on 10/29.    She always had neg NANCY here and no features of SLE, I will re-check NANCY, lupus markers and anti-DNA.    Flu shot    Labs today    Beng arthritis for R shoulder pain    Refer to PT for R shoulder bursitis      Return in 6 months        MEDICATION CHANGES: None.    Orders Placed This Encounter   Procedures     Vitamin D Deficiency     SSA Ro ANA CRISTINA Antibody IgG     SSB La ANA CRISTINA Antibody IgG     Anti Nuclear Edith IgG by IFA with Reflex     DNA double stranded antibodies     Complement C3     Complement C4     PHYSICAL THERAPY REFERRAL           Again, thank you for allowing me to participate in the care of your patient.      Sincerely,    Marie Charles MD

## 2019-10-11 NOTE — NURSING NOTE
/59   Pulse 73   Temp 97.5  F (36.4  C) (Oral)   Ht 1.524 m (5')   Wt 71.6 kg (157 lb 14.4 oz)   BMI 30.84 kg/m    Chief Complaint   Patient presents with     RECHECK     follow up with arthritis, tzimmer cma

## 2019-10-11 NOTE — PROGRESS NOTES
Rheumatology F/U Note    Date of last visit: 10/12/2018  Today's visit date: 10/11/2019    Reason for visit: Seronegative non-erosive inflammatory arthritis        HPI from initial visit    Gwendolyn Melgar is a 38 yo WF who was referred to our clinic for evaluation and management of her IA.    On 1/30/2006, she woke up with pain/swelling of L 2nd finger. Was treated with prednisone and reportedly L hand MRI showed inflammatory arthritis. She had neg HLA-B27, NANCY, RF and anti-CCP in 2006. She was referred to rheumatology and was put on SSZ (? dose, ?2-3 tab twice a day). She was able to make a full fist and her joint sx improved. She was on that x 6 months and was told to stop it. Her arthritis was under good control x 3 yr.     In 2010, started having low back pain. She had problem with low back pain and getting out of the chair. At that time, had hand AM stiffness in AM to the point that could not open her hands. She was put on lodine. It helped her. Was told to taper the dose from 2 a day to 1 a day.    Last summer, they bought a new house and her father had double lung transplant. She started having low back pain in 10/2013, had severe pain to the point that she could not get off the floor without help. She was prescribed prednisone 20 max over 20 days. It helped her. Had unremarkable L hip x-ray. Never had SI joint x-ray or MRI.    Was recommended to start anti-TNF Tx but she is concerned about side effects and is seeking second opinion.      Reports fatigue. Currently has a cold with dry cough. Has h/o alopecia areata, has thin hair. Has occasional diarrhea sec anxiety, anxiety on lexapro, myalgia, occasional numbness/tingling ta night in hands which wakes her up at night.    Today, has pain around shoulders and hips which could be sec sitting at desc and cold.    Interval hx: Her extensive rheumatologic work up at initial visit was negative. I diagnosed her with seroneg non-erosive IA and advised her to increase  lodine from 1 to 2 a day which she did but could not tolerate it a sit caused GI upset. She had pain/tenderness/swelling of PIP joints with AM stiffness> 1 hr. Prescribed her  mg bid in 3/2014, she reports significant improvement of joint sx on it. Tolerates it well. Was also put her on cymbalta at the same time helped with her mood and back pain. She stopped cymbalta as had pregnancy plan, reports occasional LBP off cymbalta. Had leg cramps at last visit, zanaflex was prescribed but leg cramps resolved on its won and she did not try the zanaflex. R hip bursitis improved after doing PT.    She is feeling well today. Just found out to be pregnant on Maddox's day. She is 6 wk pregnant, is excited about it. Her TG is 10/17/2015. She is due for eye exam in 9/2015. She is still taking the HCQ and is helping, no joint pain/swelling or AM stiffness. Had pain over R foot on walking about 6 wk ago when she was in [place], it eventually resolved on its own.        12/2016: Doing well with no joint pain or arthritis flare. Now is going through IVF, had one round in 11/2016 which was not successful, now is going to do another round in January 2017. She developed R sciatica pain after 1st round of IVF which resolved after working with chiropractor. 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 I don't have her test results, she thinks they were tested only once.      10/2017: No arthritis flare up since last visit, doing well. Has ongoing aching around shoulders and low back which is intermittent, started to see a chiropractor and it's helping. She failed IVF, one fresh embryo (x2 embryos) transfer and one frozen embryo transfer. Now is on waiting list to get donor embryo, is hoping to get one by March 2018. 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 Her OB believes that + APS antibodies might explain the fetal loss at 12 wk when Gwendolyn got pregnant without assistance; however it was never confirmed, no genetic testing was done on fetus.    She reports no recurrence of hand stiffness or pain since she started taking HCQ.    10/2018: 2wk ago, started to hurt over R shoulder pain and L 2nd finger pain. Otherwise had a good yr. Taking a hot bath helps. Tylenol or ibuprofen helps. zanaflex helps with leg cramps and pain.    Sometimes gets canker sores.    No major fatigue.    Overall doing fairly well with no major arthritis flare up on HCQ, waiting for donor embryo. On 8/3/2018, had surgery for endometriosis, it was converted to open laparotomy, right salpingo-oophorectomy. Appendectomy. Left salpingectomy      Today 10/11/2019: R shoulder was hurting last winter, got better and now the pain is back. When it gets cold, her 5th fingers hurt. R 5th PIP hurt. No joint swelling.     Hip pain is good.    She does stretching exercises.    Now goes to walking track at high school and also does bowling.    Last eye exam was on 10/4/2019.    In the process of getting donated embryo transferred (10/29/2019), excited about it.     ROS:  A comprehensive ROS was done, positives are per HPI.      Interval History (2015)  Pt states that she had a spontaneous  at approximately 12 weeks.  Since that time she has had a recurrence of her bilateral lower hip and back pain that started approximately 1-2 months after the miscarriage occurred.  Morning stiffness duration varies with activity and achiness that lasts for approximately on hour. Pt states that he rpain is currenlty a 1-2/10 at its maximum, 3-4/10 at maximum.  Ice and physical therapy alleviated pain.  She is not taking any analgesics.  Achiness can return at the end of the day.  Pt states taht she feels taht her symptoms  are being well controlled on plaquenil and that she is not experiencing any side effects.  She states that she would like to remain on the plaquenil.  She is trying to become pregnant again, so she does not want to initiate any medications that are potentially teratogenic.  She states that she doesn't think that she needs a short course of prednisone to deal with her current symptoms    Pt states that in February she had an acute attack of right 1st metatarsophalangeal joint pain.  Pt denies callor, edema, or warmth.  Pt states that the pain is severe to the point that she can't walk on it.  Pain is worse than her normal arthritis pain.  Pt does not drink.  No association with seafood, organ meats.  Pt states that she was eating a lot of red meats during that period.     ROS:  Affirms SI joint and trochanteric pain with 1 hour of morning stiffness.  Denies HA, fevers, chills, night sweats, changes in vision, sicca symptoms, ulcers, epistaxis, lumps and bumps in neck, chest pain, palpitations, SOB, stomach pain, n/v/d, constipation, hematochezia, melena, dysuria, hematuria, weakness of muscles, new rashes, raynaud's.    Today: Had neg HLA-B27 and neg pelvic MRI for sacroiliitis. Her SI joint pain resolved. Reports intermittent B/L shoulder pain and R hip bursitis pain but overall her pain much less and she did not have as much pain and problem with her joints this past winter. After having a miscarriage at 11 wk, now has infertility problem. Reports developing ovarian cyst after taking clomiphene, now is on both metformin and OC, will try fertility meds again after resolution of cysts. She reports having diarrhea on metformin but now it's better.    Her limited rheumatology records were reviewed.    This Document is currently in Final Status  Exam Accession# [id number]     Signs and Symptoms for Radiological Exam from [facility]:       * hip pain  pt states left hip pain no injury  [id number]  History from [facility]:       *  ARTHROPATHY, UNSPECIFIED, SITE UNSPECIFIED; PAIN IN JOINT, PELVIC REGION AND THIGH  AP PELVIS AND LATERAL VIEW LEFT HIP 11/05/2012     COMPARISON: AP pelvis 05/11/2006.     FINDINGS: The pelvic ring is intact. Normal alignment of the left hip.  The joint spaces are symmetric. Tiny accessory ossicle at the lateral  aspect of the left acetabular roof. This is stable from the previous  study.     tt        Examination Interpreted at: [facility],   [place], [place]  The Interpreting Physician is FERNANDO DAVIS  Exam was completed at [facility]  Component      Latest Ref Rng 1/23/2014 1/23/2014          12:00 AM 12:00 AM   Sodium      137 - 145 mmol/L 137    Potassium      3.6 - 5.0 mmol/L 4.4    Chloride      98 - 107 mmol/L 99    CARBON DIOXIDE, TOTAL      22 - 35 mmol/L 26    Anion Gap       12    Glucose      65 - 100 mg/dL 100    Urea Nitrogen      7 - 20 mg/dL 14    Creatinine      0.5 - 1.0 mg/dL 0.7    Calcium      8.4 - 10.2 mg/dL 9.3    Protein Total      6.3 - 8.2 g/dL 7.1    Albumin      3.5 - 5.0 g/dL 4.2    Bilirubin Total      0.2 - 1.3 mg/dL 0.5    Alkaline Phosphatase      38 - 126 U/L 47    AST      14 - 59 U/L 26    ALT      9 - 72 U/L 28    RNP Antibodies      0.0 - 0.9 AI <0.2 <0.2   Kevin Antibodies      0.0 - 0.9 AI <0.2 <0.2   Scleroderma Antibody Scl-70 ANA CRISTINA IgG      0.0 - 0.9 AI  <0.2   Sjogren's Anti-SS-A      0.0 - 0.9 AI  <0.2   Sjogren's Anti-SS-B      0.0 - 0.9 AI  <0.2   Antichromatin Antibodies      0.0 - 0.9 AI  <0.2   Anti-Laura-1      0.0 - 0.9 Al  <0.2   Centomere B Atb      0.0 - 0.9 AI  <0.2   QuantiFERON TB Gold       Neg    QuantiFERON TB Ag Value       0.05    QuantiFERON Nil Value       0.05    QuantiFERON Mitogen Value       >10.00    QFT TB Ag minus Nil Value       0.00    NANCY Screen by EIA       Neg    Hepatitis C PANKAJ      0.0 - 0.9 0.1    Vitamin D,25-Hydroxy      30.0 - 100.0 ng/mL 34.8    HBsAg Screen       Neg    Hepatitis B Core Antibody        Neg    Complement C4      9 - 36 16    Complement C3      90 - 180 153    Rheumatoid Factor      0.0 - 13.9 KIU/L 9.8    C-Reactive Protein      0.0 - 4.9 mg/dL 2.7    Anti-DNA (DS) Ab Qn      0 - 9 IU/mL 9    CCP Antibodies      0 - 19 U/mL 3      Exam: Bilateral wrists, 3 views each. 1/10/2014.    Comparison: None.    Clinical history: Arthropathy.    Findings: 3 views each of the bilateral wrists were obtained. Joint  spaces are well-maintained. No erosive changes are noted. No soft  tissue abnormalities are seen.            Result Impression       Impression: No erosive changes in the bilateral wrists.    AVELINA HIGGINBOTHAM MD  I have personally reviewed the image and initial interpretation, and I  agree with findings.     Exam: Bilateral hands, 3 views each. 1/10/2014.    Comparison: None.    Clinical history: Arthropathy.    Findings: 3 views each of the bilateral hands were obtained. The joint  spaces are well-maintained. No soft tissue abnormalities are noted. No  erosive changes are seen.            Result Impression       Impression: No erosive changes in the bilateral hands.    AVELINA HIGGINBOTHAM MD  I have personally reviewed the image and initial interpretation, and I  agree with findings.     Exam: Sacroiliac joints, 3 views. 1/10/2014.    Comparison: None.    Clinical history: Arthropathy.    Findings: 3 views of the sacroiliac joints were obtained. The  sacroiliac joints are patent. No abnormal sclerosis is noted. No  definite erosive changes are seen.            Result Impression       Impression: No acute bone abnormality in the sacroiliac joints.    AVELINA HIGGINBOTHAM MD  I have personally reviewed the image and initial interpretation, and I  agree with findings.          Component      Latest Ref Rng 2/20/2015   WBC      4.0 - 11.0 10e9/L 11.1 (H)   RBC Count      3.8 - 5.2 10e12/L 4.28   Hemoglobin      11.7 - 15.7 g/dL 13.1   Hematocrit      35.0 - 47.0 % 38.9   MCV      78 - 100 fl 91   MCH      26.5 -  33.0 pg 30.6   MCHC      31.5 - 36.5 g/dL 33.7   RDW      10.0 - 15.0 % 13.1   Platelet Count      150 - 450 10e9/L 238   Diff Method       Automated Method   % Neutrophils       66.4   % Lymphocytes       21.7   % Monocytes       9.6   % Eosinophils       1.5   % Basophils       0.4   % Immature Granulocytes       0.4   Absolute Neutrophil      1.6 - 8.3 10e9/L 7.4   Absolute Lymphocytes      0.8 - 5.3 10e9/L 2.4   Absolute Monoctyes      0.0 - 1.3 10e9/L 1.1   Absolute Eosinophils      0.0 - 0.7 10e9/L 0.2   Absolute Basophils      0.0 - 0.2 10e9/L 0.0   Abs Immature Granulocytes      0 - 0.4 10e9/L 0.0   Creatinine      0.52 - 1.04 mg/dL 0.72   GFR Estimate      >60 mL/min/1.7m2 90   GFR Estimate If Black      >60 mL/min/1.7m2 >90 . . .   CRP Inflammation      0.0 - 8.0 mg/L 3.5   Sed Rate      0 - 20 mm/h 9   Albumin      3.4 - 5.0 g/dL 3.8   ALT      0 - 50 U/L 67 (H)   AST      0 - 45 U/L 28     Component      Latest Ref [facility] 8/21/2015   WBC      4.0 - 11.0 10e9/L 7.9   RBC Count      3.8 - 5.2 10e12/L 4.50   Hemoglobin      11.7 - 15.7 g/dL 13.8   Hematocrit      35.0 - 47.0 % 40.0   MCV      78 - 100 fl 89   MCH      26.5 - 33.0 pg 30.7   MCHC      31.5 - 36.5 g/dL 34.5   RDW      10.0 - 15.0 % 12.6   Platelet Count      150 - 450 10e9/L 273   Diff Method       Automated Method   % Neutrophils       66.4   % Lymphocytes       23.6   % Monocytes       8.0   % Eosinophils       1.3   % Basophils       0.3   % Immature Granulocytes       0.4   Absolute Neutrophil      1.6 - 8.3 10e9/L 5.3   Absolute Lymphocytes      0.8 - 5.3 10e9/L 1.9   Absolute Monoctyes      0.0 - 1.3 10e9/L 0.6   Absolute Eosinophils      0.0 - 0.7 10e9/L 0.1   Absolute Basophils      0.0 - 0.2 10e9/L 0.0   Abs Immature Granulocytes      0 - 0.4 10e9/L 0.0   Creatinine      0.52 - 1.04 mg/dL 0.78   GFR Estimate      >60 mL/min/1.7m2 81   GFR Estimate If Black      >60 mL/min/1.7m2 >90 . . .   [id number] Method       SSOP   B locus       B27  Neg   CRP Inflammation      0.0 - 8.0 mg/L <2.9   Sed Rate      0 - 20 mm/h 11   Albumin      3.4 - 5.0 g/dL 4.2   ALT      0 - 50 U/L 50   AST      0 - 45 U/L 29   HLA B27 Typing       Specimen received - Immunology report to follow upon completion.   Exam: MRI of the sacroiliac joints dated 10/21/2015.  Comparison: 1/10/2014.  Clinical history: Evaluate for sacroiliitis.  Technique: Multiplanar, multisequence MR imaging of the sacroiliac  joints were obtained using standard sequences in 2 orthogonal planes  before and after the uneventful administration of intravenous  gadolinium contrast.  Findings:  No significant fluid in the sacroiliac joints. No abnormal enhancement  to suggest sacroiliitis. No erosive changes are noted.  The muscle bulk is intact without significant atrophy or edema. The  visualized intrapelvic structures are unremarkable. No  lymphadenopathy. No full-thickness tear or tendon retraction.  No abnormal marrow signal to suggest fracture, osteonecrosis, or  marrow infiltration. Nonspecific subtle focus of T2 hyperintensity  within the left iliac bone, coronal series 3 image 10, likely  vascularity.  Large field-of-view limits evaluation the hip joints. No  full-thickness cartilage loss or joint effusion. The visualized lower  lumbar spine is unremarkable.  IMPRESSION  Impression:  1. No findings to suggest sacroiliitis.  2. No abnormal marrow signal to suggest fracture, osteonecrosis, or  marrow infiltration.  AVELINA HIGGINBOTHAM MD  Component      Latest Ref Rng & Units 12/16/2016   Cardiolipin Antibody IgG      0.0 - 19.9 GPL-U/mL 3.2   Cardiolipin Antibody IgM      0.0 - 19.9 MPL-U/mL 27.7 (H)   Cardiolipin Antibody IgA      0.0 - 19.9 APL U/mL 5.7   Beta 2 Glycoprotein 1 Antibody IgA      <7 U/mL 1.6     Component      Latest Ref Rng & Units 10/13/2017   WBC      4.0 - 11.0 10e9/L 7.3   RBC Count      3.8 - 5.2 10e12/L 4.13   Hemoglobin      11.7 - 15.7 g/dL 12.6   Hematocrit      35.0 - 47.0 %  37.5   MCV      78 - 100 fl 91   MCH      26.5 - 33.0 pg 30.5   MCHC      31.5 - 36.5 g/dL 33.6   RDW      10.0 - 15.0 % 11.8   Platelet Count      150 - 450 10e9/L 283   Diff Method       Automated Method   % Neutrophils      % 63.1   % Lymphocytes      % 25.2   % Monocytes      % 9.0   % Eosinophils      % 1.6   % Basophils      % 0.8   % Immature Granulocytes      % 0.3   Nucleated RBCs      0 /100 0   Absolute Neutrophil      1.6 - 8.3 10e9/L 4.6   Absolute Lymphocytes      0.8 - 5.3 10e9/L 1.8   Absolute Monocytes      0.0 - 1.3 10e9/L 0.7   Absolute Eosinophils      0.0 - 0.7 10e9/L 0.1   Absolute Basophils      0.0 - 0.2 10e9/L 0.1   Abs Immature Granulocytes      0 - 0.4 10e9/L 0.0   Absolute Nucleated RBC       0.0   Color Urine       Yellow   Appearance Urine       Slightly Cloudy   Glucose Urine      NEG:Negative mg/dL Negative   Bilirubin Urine      NEG:Negative Negative   Ketones Urine      NEG:Negative mg/dL Negative   Specific Gravity Urine      1.003 - 1.035 1.015   Blood Urine      NEG:Negative Negative   pH Urine      5.0 - 7.0 pH 5.0   Protein Albumin Urine      NEG:Negative mg/dL Negative   Urobilinogen mg/dL      0.0 - 2.0 mg/dL 0.0   Nitrite Urine      NEG:Negative Negative   Leukocyte Esterase Urine      NEG:Negative Small (A)   Source       Midstream Urine   WBC Urine      0 - 2 /HPF 2   RBC Urine      0 - 2 /HPF 1   Bacteria Urine      NEG:Negative /HPF Few (A)   Squamous Epithelial /HPF Urine      0 - 1 /HPF 4 (H)   Mucous Urine      NEG:Negative /LPF Present (A)   Creatinine      0.52 - 1.04 mg/dL 0.91   GFR Estimate      >60 mL/min/1.7m2 67   GFR Estimate If Black      >60 mL/min/1.7m2 82   Cardiolipin Antibody IgG      0.0 - 19.9 GPL-U/mL 3.5   Cardiolipin Antibody IgM      0.0 - 19.9 MPL-U/mL 27.0 (H)   Protein Random Urine      g/L 0.18   Protein Total Urine g/gr Creatinine      0 - 0.2 g/g Cr 0.15   NANCY interpretation      NEG:Negative Negative   NANCY titer 1       1:40   Lupus  Result      NEG:Negative Negative   Beta 2 Glycoprotein 1 Antibody IgM      <7 U/mL 8.9 (H)   Beta 2 Glycoprotein 1 Antibody IgG      <7 U/mL <0.6   AST      0 - 45 U/L 19   ALT      0 - 50 U/L 35   Albumin      3.4 - 5.0 g/dL 4.0   Complement C4      15 - 50 mg/dL 19   Complement C3      76 - 169 mg/dL 131   CRP Inflammation      0.0 - 8.0 mg/L <2.9   Sed Rate      0 - 20 mm/h 18   DNA-ds      <10 IU/mL 4   Creatinine Urine      mg/dL 124     HISTORY REVIEW:  Past Medical History:   Diagnosis Date     Anxiety 2013     Asthma      Chronic sinusitis 2018     Hypertension      Inflammatory arthritis      Past Surgical History:   Procedure Laterality Date     CHOLECYSTECTOMY       GALLBLADDER SURGERY       GYN SURGERY  2018    both my Fallopian tube, and right ovary, and appendix     HYSTERECTOMY Right     Partial- right ovary removed     TONSILLECTOMY       TONSILLECTOMY  1993     Family History   Problem Relation Age of Onset     Lupus Paternal Aunt         father had double lung transplant for alpha 1 anti-trypsin def     Arthritis Maternal Grandmother         RA     Hypertension Maternal Grandmother      Depression Maternal Grandmother      Arthritis Paternal Grandfather         RA     Family History Negative Other         neg for AS, psoriasis     Gastrointestinal Disease Other         cousin has colitis     Diabetes Father         due to transplant     Allergies Father      Diabetes Other         Aunts on both sides     Hypertension Mother      Allergies Mother      Hypertension Maternal Grandfather      Arthritis Maternal Grandfather      Arthritis Paternal Grandmother      Other - See Comments Other         fibromyalgia - paternal aunt     Diabetes Other      Social History     Socioeconomic History     Marital status:      Spouse name: Not on file     Number of children: 0     Years of education: Not on file     Highest education level: Not on file   Occupational History     Occupation:       Employer: DNR   Social Needs     Financial resource strain: Not on file     Food insecurity:     Worry: Not on file     Inability: Not on file     Transportation needs:     Medical: Not on file     Non-medical: Not on file   Tobacco Use     Smoking status: Never Smoker     Smokeless tobacco: Never Used   Substance and Sexual Activity     Alcohol use: Yes     Comment: occ     Drug use: No     Sexual activity: Yes     Partners: Male     Birth control/protection: None   Lifestyle     Physical activity:     Days per week: Not on file     Minutes per session: Not on file     Stress: Not on file   Relationships     Social connections:     Talks on phone: Not on file     Gets together: Not on file     Attends Spiritism service: Not on file     Active member of club or organization: Not on file     Attends meetings of clubs or organizations: Not on file     Relationship status: Not on file     Intimate partner violence:     Fear of current or ex partner: Not on file     Emotionally abused: Not on file     Physically abused: Not on file     Forced sexual activity: Not on file   Other Topics Concern     Not on file   Social History Narrative    2019        ENVIRONMENTAL HISTORY: [place] home is heated with a gas furnace and infloor heating. They do have central air conditioning. The patient's bedroom is furnished with carpeting in bedroom and fabric window coverings.  No pets inside the house. There is no history of cockroach or mice infestation. There are no smokers in the house.  The house does not have a basement.        PMHx, FHx, SHx were reviewed, unchanged.    Pregnancy Hx: She is  s/p one miscarriage around 12 wk    Outpatient Encounter Medications as of 10/11/2019   Medication Sig Dispense Refill     albuterol (VENTOLIN HFA) 108 (90 BASE) MCG/ACT inhaler Inhale 2 Puffs into the lungs four times a day as needed for Wheezing or Shortness of Breath (as  needed). Shake before using.       BUDESONIDE NA        escitalopram (LEXAPRO) 10 MG tablet Take 10 mg by mouth daily  0     hydroxychloroquine (PLAQUENIL) 200 MG tablet Take 1 tablet (200 mg) by mouth 2 times daily 180 tablet 1     hydrOXYzine (ATARAX) 25 MG tablet Take 25 mg by mouth every 8 hours as needed for itching       labetalol (NORMODYNE) 200 MG tablet Take 100 mg by mouth 2 times daily       metFORMIN (GLUCOPHAGE) 500 MG tablet        montelukast (SINGULAIR) 10 MG tablet Take 1 tablet by mouth daily  3     Prenatal Multivit-Min-Fe-FA (PRENATAL VITAMINS PO) With iron       tiZANidine (ZANAFLEX) 4 MG tablet Take 4 mg by mouth 3 times daily       traMADol (ULTRAM) 50 MG tablet Take 50 mg by mouth       VITAMIN D, CHOLECALCIFEROL, PO Take 2,000 Units by mouth daily       budesonide (RINOCORT AQUA) 32 MCG/ACT nasal spray Spray 1 spray into both nostrils daily (Patient not taking: Reported on 10/11/2019) 8.6 Bottle 3     cetirizine (ZYRTEC) 10 MG tablet Take 10 mg by mouth as needed for allergies       fluticasone (FLOVENT HFA) 110 MCG/ACT inhaler Inhale 2 Puffs into the lungs two times a day. 4 puffs twice daily in yellow zone       norethindrone-ethinyl estradiol-iron (ESTROSTEP FE) 1-20/1-30/1-35 MG-MCG per tablet Take 1 tablet by mouth daily       ORILISSA 200 MG TABS TAKE 200 MG BY MOUTH TWO TIMES A DAY.  5     Facility-Administered Encounter Medications as of 10/11/2019   Medication Dose Route Frequency Provider Last Rate Last Dose     influenza quadrivalent (PF) vacc (FLUZONE) injection 0.5 mL  0.5 mL Intramuscular Prior to discharge Marie Charles MD         Allergies   Allergen Reactions     Tetanus Immune Globulin      Fever     Tetanus Toxoid            Ph.E:    Vitals:    10/11/19 1038   BP: 138/59   Pulse: 73   Temp: 97.5  F (36.4  C)   TempSrc: Oral   Weight: 71.6 kg (157 lb 14.4 oz)   Height: 1.524 m (5')       Constitutional: WD/WN. Pleasant. In no acute distress.   Eyes: EOM intact, PERRLA,  sclera anicteric, conj not injected  HEENT: No oral ulcers or thrush. Normal salivary pool.  Neck: No cervical LAP  Chest: Clear to auscultation bilaterally  CV: RRR, no murmurs/ rubs or gallops. No edema, clubbing or cyanosis.   GI: Abdomen is soft and non tender.     MS: no joint tenderness, tenderness over R deltoid. No active synovitis. No joint deformities.  No nodules. +FM TP  Skin: No skin rash, malar rash, livedo, periungual erythema,digital ulcers or nail changes. + thinning of hair (allopecia).  Neuro: A&O x 3. 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 Received MRI report of L index finger 4/06  which showed L index finger edema from PIP to DIP (non specific finding). Her work up at initial visit in 1/2014 was suggestive of seroneg non-erosive IA. She was recommended to increase lodine to 2 tab a day but could not tolerate it sec GI upset. Has FM TP but FMS can't explain all her pain including pain/tenderness/swelling over PIP joints, AM stiffness> 1hr and good response to steroid/SSZ in the past. For AI, recommended a trial of HCQ. She is on HCQ since 3/2014 with excellent response. Her IA is under excellent control on HCQ. R shoulder pain is due to bursitis.    -neg HLA-B27 and neg pelvic MRI 10/2015 for sacroiliitis, SI joint pain resolved. Now has intermittent low back and shoulder pain, which is most likely due to FMS, seeing a chiropractor helps.    - continue plaquenil 200mg PO BID, was informed that's safe in pregnancy    -Recommend eye exam for HCQ monitoring. Eye exam is updated.    #+APL ABs. 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 She was put on ASA+lovenox during IVF. She failed IVF fresh embryo (two) and leftover frozen embryo (one) transfer and was told given her age her best option would be to use donor embryo and now is on waiting list, hopes to get one soon. She is recommended to use ASA 81 mg qd+lovenox during future pregnancy with donor embryo and I agree given her h/o miscarriage at 12 wk. She has no h/o thrombosis. She still does not meet criteria for APS syndrome as miscarriage was still considered 1st trimester miscarriage.    Going to have embryo transfer on 10/29.    She always had neg NANCY here and no features of SLE, I will re-check NANCY, lupus markers and anti-DNA.    Flu shot    Labs today    Beng arthritis for R shoulder pain    Refer to PT for R shoulder bursitis      Return in 6 months        MEDICATION CHANGES: None.    Orders Placed This Encounter   Procedures     Vitamin D Deficiency     SSA Ro ANA CRISTINA Antibody IgG     SSB La ANA CRISTINA Antibody IgG     Anti Nuclear Edith IgG by IFA with Reflex     DNA double stranded antibodies     Complement C3     Complement C4     PHYSICAL THERAPY REFERRAL                 HPI      ROS      Physical Exam

## 2019-10-11 NOTE — LETTER
Patient:  Gwendolyn Melgar  :   1975  MRN:     [id number]        Ms.Gwendolyn Melgar  [id number] [facility] [id number]        October 15, 2019    Dear ,    We are writing to inform you of your test results. Please limit your vit D to 2000 units a day; otherwise your labs look good (good news).    Results for orders placed or performed in visit on 10/11/19   Vitamin D Deficiency   Result Value Ref Range    Vitamin D Deficiency screening 85 (H) 20 - 75 ug/L   SSA Ro ANA CRISTINA Antibody IgG   Result Value Ref Range    SSA (Ro) (ANA CRISTINA) Antibody, IgG <0.2 0.0 - 0.9 AI   SSB La ANA CRISTINA Antibody IgG   Result Value Ref Range    SSB (La) (ANA CRISTINA) Antibody, IgG <0.2 0.0 - 0.9 AI   Anti Nuclear Edith IgG by IFA with Reflex   Result Value Ref Range    NANCY interpretation Negative NEG^Negative   DNA double stranded antibodies   Result Value Ref Range    DNA-ds 4 <10 IU/mL   Complement C3   Result Value Ref Range    Complement C3 119 76 - 169 mg/dL   Complement C4   Result Value Ref Range    Complement C4 15 15 - 50 mg/dL       Marie Charles MD

## 2019-10-13 NOTE — RESULT ENCOUNTER NOTE
No SSA antibody to affect baby's heart which is good news! Please cut back on your vit D to 2000 units a day.

## 2019-10-14 LAB
ANA SER QL IF: NEGATIVE
DSDNA AB SER-ACNC: 4 IU/ML

## 2020-01-02 ENCOUNTER — DOCUMENTATION ONLY (OUTPATIENT)
Dept: OTOLARYNGOLOGY | Facility: CLINIC | Age: 45
End: 2020-01-02

## 2020-01-02 NOTE — PROGRESS NOTES
"Paper documentation states   \"No PA is required. Coverage based on medical necessity. Ok to move ahead.\"   -Suha 8/26/2019    - 9/18/2019   Patient will call when ready to schedule   "

## 2020-01-23 DIAGNOSIS — M06.4 UNDIFFERENTIATED INFLAMMATORY ARTHRITIS (H): Primary | ICD-10-CM

## 2020-01-24 RX ORDER — HYDROXYCHLOROQUINE SULFATE 200 MG/1
200 TABLET, FILM COATED ORAL 2 TIMES DAILY
Qty: 180 TABLET | Refills: 1 | Status: SHIPPED | OUTPATIENT
Start: 2020-01-24 | End: 2020-05-13

## 2020-03-02 ENCOUNTER — HEALTH MAINTENANCE LETTER (OUTPATIENT)
Age: 45
End: 2020-03-02

## 2020-05-10 DIAGNOSIS — M06.4 UNDIFFERENTIATED INFLAMMATORY ARTHRITIS (H): ICD-10-CM

## 2020-05-13 RX ORDER — HYDROXYCHLOROQUINE SULFATE 200 MG/1
200 TABLET, FILM COATED ORAL 2 TIMES DAILY
Qty: 180 TABLET | Refills: 1 | Status: SHIPPED | OUTPATIENT
Start: 2020-05-13 | End: 2020-11-02

## 2020-05-13 NOTE — TELEPHONE ENCOUNTER
"Plaquenil    Take 1 tablet (200 mg) by mouth 2 times daily     Last Written Prescription Date:  1/24/20  Last Fill Quantity: 180,   # refills: 1  Last Office Visit: 10/11/2019  Future Office visit: 8/7/20  Last Eye Exam: 10/4/2019 [facility]  Cr=0.80 on 2/25/20 per patient attached MyChart lab work 3/10/20     Appt in April cancelled, labs sent per MyChart.  \"(Due to the COVID-19 I feel I do not want to come down to the [facility] for this appointment. So far I am feeling good. I will reschedule when it is safe to go again.)\"            "

## 2020-07-09 DIAGNOSIS — J31.0 CHRONIC RHINITIS: ICD-10-CM

## 2020-07-09 DIAGNOSIS — J32.9 RECURRENT SINUS INFECTIONS: ICD-10-CM

## 2020-07-21 ENCOUNTER — DOCUMENTATION ONLY (OUTPATIENT)
Dept: CARE COORDINATION | Facility: CLINIC | Age: 45
End: 2020-07-21

## 2020-08-07 ENCOUNTER — VIRTUAL VISIT (OUTPATIENT)
Dept: RHEUMATOLOGY | Facility: CLINIC | Age: 45
End: 2020-08-07
Attending: INTERNAL MEDICINE
Payer: COMMERCIAL

## 2020-08-07 ENCOUNTER — TELEPHONE (OUTPATIENT)
Dept: RHEUMATOLOGY | Facility: CLINIC | Age: 45
End: 2020-08-07

## 2020-08-07 DIAGNOSIS — M06.4 UNDIFFERENTIATED INFLAMMATORY ARTHRITIS (H): Primary | ICD-10-CM

## 2020-08-07 RX ORDER — LEUPROLIDE ACETATE 1 MG/0.2ML
KIT SUBCUTANEOUS
COMMUNITY
Start: 2020-06-23 | End: 2021-04-11

## 2020-08-07 ASSESSMENT — PAIN SCALES - GENERAL: PAINLEVEL: MODERATE PAIN (4)

## 2020-08-07 NOTE — PROGRESS NOTES
Rheumatology F/U Virtual Visit Note    Date of last visit: 10/11/2019  Today's visit date: 8/7/2020    Reason for visit: Seronegative non-erosive inflammatory arthritis    (this is a phone visit due to COVID-19 outbreak), patient agreed    HPI from initial visit    Gwendolyn Melgar is a 40 yo WF who was referred to our clinic for evaluation and management of her IA.    On 1/30/2006, she woke up with pain/swelling of L 2nd finger. Was treated with prednisone and reportedly L hand MRI showed inflammatory arthritis. She had neg HLA-B27, NANCY, RF and anti-CCP in 2006. She was referred to rheumatology and was put on SSZ (? dose, ?2-3 tab twice a day). She was able to make a full fist and her joint sx improved. She was on that x 6 months and was told to stop it. Her arthritis was under good control x 3 yr.     In 2010, started having low back pain. She had problem with low back pain and getting out of the chair. At that time, had hand AM stiffness in AM to the point that could not open her hands. She was put on lodine. It helped her. Was told to taper the dose from 2 a day to 1 a day.    Last summer, they bought a new house and her father had double lung transplant. She started having low back pain in 10/2013, had severe pain to the point that she could not get off the floor without help. She was prescribed prednisone 20 max over 20 days. It helped her. Had unremarkable L hip x-ray. Never had SI joint x-ray or MRI.    Was recommended to start anti-TNF Tx but she is concerned about side effects and is seeking second opinion.      Reports fatigue. Currently has a cold with dry cough. Has h/o alopecia areata, has thin hair. Has occasional diarrhea sec anxiety, anxiety on lexapro, myalgia, occasional numbness/tingling ta night in hands which wakes her up at night.    Today, has pain around shoulders and hips which could be sec sitting at desc and cold.    Interval hx: Her extensive rheumatologic work up at initial visit was  negative. I diagnosed her with seroneg non-erosive IA and advised her to increase lodine from 1 to 2 a day which she did but could not tolerate it a sit caused GI upset. She had pain/tenderness/swelling of PIP joints with AM stiffness> 1 hr. Prescribed her  mg bid in 3/2014, she reports significant improvement of joint sx on it. Tolerates it well. Was also put her on cymbalta at the same time helped with her mood and back pain. She stopped cymbalta as had pregnancy plan, reports occasional LBP off cymbalta. Had leg cramps at last visit, zanaflex was prescribed but leg cramps resolved on its won and she did not try the zanaflex. R hip bursitis improved after doing PT.    She is feeling well today. Just found out to be pregnant on Maddox's day. She is 6 wk pregnant, is excited about it. Her TG is 10/17/2015. She is due for eye exam in 9/2015. She is still taking the HCQ and is helping, no joint pain/swelling or AM stiffness. Had pain over R foot on walking about 6 wk ago when she was in [place], it eventually resolved on its own.        12/2016: Doing well with no joint pain or arthritis flare. Now is going through IVF, had one round in 11/2016 which was not successful, now is going to do another round in January 2017. She developed R sciatica pain after 1st round of IVF which resolved after working with chiropractor. 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 I don't have her test results, she thinks they were tested only once.      10/2017: No arthritis flare up since last visit, doing well. Has ongoing aching around shoulders and low back which is intermittent, started to see a chiropractor and it's helping. She failed IVF, one fresh embryo (x2 embryos) transfer and one frozen embryo transfer. Now is on waiting list to get donor embryo, is hoping to get one by March 2018.  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 Her OB believes that + APS antibodies might explain the fetal loss at 12 wk when Gwendolyn got pregnant without assistance; however it was never confirmed, no genetic testing was done on fetus.    She reports no recurrence of hand stiffness or pain since she started taking HCQ.    10/2018: 2wk ago, started to hurt over R shoulder pain and L 2nd finger pain. Otherwise had a good yr. Taking a hot bath helps. Tylenol or ibuprofen helps. zanaflex helps with leg cramps and pain.    Sometimes gets canker sores.    No major fatigue.    Overall doing fairly well with no major arthritis flare up on HCQ, waiting for donor embryo. On 8/3/2018, had surgery for endometriosis, it was converted to open laparotomy, right salpingo-oophorectomy. Appendectomy. Left salpingectomy      10/11/2019: R shoulder was hurting last winter, got better and now the pain is back. When it gets cold, her 5th fingers hurt. R 5th PIP hurt. No joint swelling.     Hip pain is good.    She does stretching exercises.    Now goes to walking track at high school and also does bowling.    Last eye exam was on 10/4/2019.    In the process of getting donated embryo transferred (10/29/2019), excited about it.    Today 8/7/2020: Had 2 donated embryos trannsferred in 10/2019. Was able too kept one and had a positive pregnacy test. Unfortunately miscarried at 12 wk. At 8 wk baby stopped. Was on ASA and lovenox the whole time.    She started another cycle and is going to have last left over donated embryo transferred on 9/1/2020.    Her hips, knees ache with activity sometimes but pin is mild.    Hands are good. Sometimes her R 5th finger gets stuck.    R shoulder pain has not resolved, reports one tender spot over lateral side of R shoulder, hs full ROM.    Eye exam was done in 10/2019.    ROS:   A comprehensive ROS was done, positives are per HPI.      Interval History (2015)  Pt states that she had a spontaneous  at approximately 12 weeks.  Since that time she has had a recurrence of her bilateral lower hip and back pain that started approximately 1-2 months after the miscarriage occurred.  Morning stiffness duration varies with activity and achiness that lasts for approximately on hour. Pt states that he rpain is currenlty a 1-2/10 at its maximum, 3-4/10 at maximum.  Ice and physical therapy alleviated pain.  She is not taking any analgesics.  Achiness can return at the end of the day.  Pt states taht she feels taht her symptoms are being well controlled on plaquenil and that she is not experiencing any side effects.  She states that she would like to remain on the plaquenil.  She is trying to become pregnant again, so she does not want to initiate any medications that are potentially teratogenic.  She states that she doesn't think that she needs a short course of prednisone to deal with her current symptoms    Pt states that in February she had an acute attack of right 1st metatarsophalangeal joint pain.  Pt denies callor, edema, or warmth.  Pt states that the pain is severe to the point that she can't walk on it.  Pain is worse than her normal arthritis pain.  Pt does not drink.  No association with seafood, organ meats.  Pt states that she was eating a lot of red meats during that period.     ROS:  Affirms SI joint and trochanteric pain with 1 hour of morning stiffness.  Denies HA, fevers, chills, night sweats, changes in vision, sicca symptoms, ulcers, epistaxis, lumps and bumps in neck, chest pain, palpitations, SOB, stomach pain, n/v/d, constipation, hematochezia, melena, dysuria, hematuria, weakness of muscles, new rashes, raynaud's.    Today: Had neg HLA-B27 and neg pelvic MRI for sacroiliitis. Her SI joint pain resolved. Reports intermittent B/L shoulder pain and R hip bursitis pain  but overall her pain much less and she did not have as much pain and problem with her joints this past winter. After having a miscarriage at 11 wk, now has infertility problem. Reports developing ovarian cyst after taking clomiphene, now is on both metformin and OC, will try fertility meds again after resolution of cysts. She reports having diarrhea on metformin but now it's better.      Her R shoulder still hurts, santoyo hard timee sleeping on that side. Has full ROM of the shulder. Has not tried bengay.    Her limited rheumatology records were reviewed.    This Document is currently in Final Status  Exam Accession# [id number]     Signs and Symptoms for Radiological Exam from [facility]:       * hip pain  pt states left hip pain no injury  [id number]  History from [facility]:       * ARTHROPATHY, UNSPECIFIED, SITE UNSPECIFIED; PAIN IN JOINT, PELVIC REGION AND THIGH  AP PELVIS AND LATERAL VIEW LEFT HIP 11/05/2012     COMPARISON: AP pelvis 05/11/2006.     FINDINGS: The pelvic ring is intact. Normal alignment of the left hip.  The joint spaces are symmetric. Tiny accessory ossicle at the lateral  aspect of the left acetabular roof. This is stable from the previous  study.          Examination Interpreted at: [facility],   [place], [place]  The Interpreting Physician is FERNANDO DAVIS  [facility]  Component      Latest Ref Rng 1/23/2014 1/23/2014          12:00 AM 12:00 AM   Sodium      137 - 145 mmol/L 137    Potassium      3.6 - 5.0 mmol/L 4.4    Chloride      98 - 107 mmol/L 99    CARBON DIOXIDE, TOTAL      22 - 35 mmol/L 26    Anion Gap       12    Glucose      65 - 100 mg/dL 100    Urea Nitrogen      7 - 20 mg/dL 14    Creatinine      0.5 - 1.0 mg/dL 0.7    Calcium      8.4 - 10.2 mg/dL 9.3    Protein Total      6.3 - 8.2 g/dL 7.1    Albumin      3.5 - 5.0 g/dL 4.2    Bilirubin Total      0.2 - 1.3 mg/dL 0.5    Alkaline Phosphatase      38 - 126 U/L 47    AST      14 - 59 U/L 26     ALT      9 - 72 U/L 28    RNP Antibodies      0.0 - 0.9 AI <0.2 <0.2   Kevin Antibodies      0.0 - 0.9 AI <0.2 <0.2   Scleroderma Antibody Scl-70 ANA CRISTINA IgG      0.0 - 0.9 AI  <0.2   Sjogren's Anti-SS-A      0.0 - 0.9 AI  <0.2   Sjogren's Anti-SS-B      0.0 - 0.9 AI  <0.2   Antichromatin Antibodies      0.0 - 0.9 AI  <0.2   Anti-Laura-1      0.0 - 0.9 Al  <0.2   Centomere B Atb      0.0 - 0.9 AI  <0.2   QuantiFERON TB Gold       Neg    QuantiFERON TB Ag Value       0.05    QuantiFERON Nil Value       0.05    QuantiFERON Mitogen Value       >10.00    QFT TB Ag minus Nil Value       0.00    NANCY Screen by EIA       Neg    Hepatitis C PANKAJ      0.0 - 0.9 0.1    Vitamin D,25-Hydroxy      30.0 - 100.0 ng/mL 34.8    HBsAg Screen       Neg    Hepatitis B Core Antibody       Neg    Complement C4      9 - 36 16    Complement C3      90 - 180 153    Rheumatoid Factor      0.0 - 13.9 KIU/L 9.8    C-Reactive Protein      0.0 - 4.9 mg/dL 2.7    Anti-DNA (DS) Ab Qn      0 - 9 IU/mL 9    CCP Antibodies      0 - 19 U/mL 3      Exam: Bilateral wrists, 3 views each. 1/10/2014.    Comparison: None.    Clinical history: Arthropathy.    Findings: 3 views each of the bilateral wrists were obtained. Joint  spaces are well-maintained. No erosive changes are noted. No soft  tissue abnormalities are seen.            Result Impression       Impression: No erosive changes in the bilateral wrists.    AVELINA HIGGINBOTHAM MD  I have personally reviewed the image and initial interpretation, and I  agree with findings.     Exam: Bilateral hands, 3 views each. 1/10/2014.    Comparison: None.    Clinical history: Arthropathy.    Findings: 3 views each of the bilateral hands were obtained. The joint  spaces are well-maintained. No soft tissue abnormalities are noted. No  erosive changes are seen.            Result Impression       Impression: No erosive changes in the bilateral hands.    AVELINA HIGGINBOTHAM MD  I have personally reviewed the image and initial  interpretation, and I  agree with findings.     Exam: Sacroiliac joints, 3 views. 1/10/2014.    Comparison: None.    Clinical history: Arthropathy.    Findings: 3 views of the sacroiliac joints were obtained. The  sacroiliac joints are patent. No abnormal sclerosis is noted. No  definite erosive changes are seen.            Result Impression       Impression: No acute bone abnormality in the sacroiliac joints.    AVELINA HIGGINBOTHAM MD  I have personally reviewed the image and initial interpretation, and I  agree with findings.          Component      Latest Ref Rng 2/20/2015   WBC      4.0 - 11.0 10e9/L 11.1 (H)   RBC Count      3.8 - 5.2 10e12/L 4.28   Hemoglobin      11.7 - 15.7 g/dL 13.1   Hematocrit      35.0 - 47.0 % 38.9   MCV      78 - 100 fl 91   MCH      26.5 - 33.0 pg 30.6   MCHC      31.5 - 36.5 g/dL 33.7   RDW      10.0 - 15.0 % 13.1   Platelet Count      150 - 450 10e9/L 238   Diff Method       Automated Method   % Neutrophils       66.4   % Lymphocytes       21.7   % Monocytes       9.6   % Eosinophils       1.5   % Basophils       0.4   % Immature Granulocytes       0.4   Absolute Neutrophil      1.6 - 8.3 10e9/L 7.4   Absolute Lymphocytes      0.8 - 5.3 10e9/L 2.4   Absolute Monoctyes      0.0 - 1.3 10e9/L 1.1   Absolute Eosinophils      0.0 - 0.7 10e9/L 0.2   Absolute Basophils      0.0 - 0.2 10e9/L 0.0   Abs Immature Granulocytes      0 - 0.4 10e9/L 0.0   Creatinine      0.52 - 1.04 mg/dL 0.72   GFR Estimate      >60 mL/min/1.7m2 90   GFR Estimate If Black      >60 mL/min/1.7m2 >90 . . .   CRP Inflammation      0.0 - 8.0 mg/L 3.5   Sed Rate      0 - 20 mm/h 9   Albumin      3.4 - 5.0 g/dL 3.8   ALT      0 - 50 U/L 67 (H)   AST      0 - 45 U/L 28     Component      Latest Ref Rng 8/21/2015   WBC      4.0 - 11.0 10e9/L 7.9   RBC Count      3.8 - 5.2 10e12/L 4.50   Hemoglobin      11.7 - 15.7 g/dL 13.8   Hematocrit      35.0 - 47.0 % 40.0   MCV      78 - 100 fl 89   MCH      26.5 - 33.0 pg 30.7   MCHC       31.5 - 36.5 g/dL 34.5   RDW      10.0 - 15.0 % 12.6   Platelet Count      150 - 450 10e9/L 273   Diff Method       Automated Method   % Neutrophils       66.4   % Lymphocytes       23.6   % Monocytes       8.0   % Eosinophils       1.3   % Basophils       0.3   % Immature Granulocytes       0.4   Absolute Neutrophil      1.6 - 8.3 10e9/L 5.3   Absolute Lymphocytes      0.8 - 5.3 10e9/L 1.9   Absolute Monoctyes      0.0 - 1.3 10e9/L 0.6   Absolute Eosinophils      0.0 - 0.7 10e9/L 0.1   Absolute Basophils      0.0 - 0.2 10e9/L 0.0   Abs Immature Granulocytes      0 - 0.4 10e9/L 0.0   Creatinine      0.52 - 1.04 mg/dL 0.78   GFR Estimate      >60 mL/min/1.7m2 81   GFR Estimate If Black      >60 mL/min/1.7m2 >90 . . .   [id number] Method       SSOP   B locus       B27 Neg   CRP Inflammation      0.0 - 8.0 mg/L <2.9   Sed Rate      0 - 20 mm/h 11   Albumin      3.4 - 5.0 g/dL 4.2   ALT      0 - 50 U/L 50   AST      0 - 45 U/L 29   HLA B27 Typing       Specimen received - Immunology report to follow upon completion.   Exam: MRI of the sacroiliac joints dated 10/21/2015.  Comparison: 1/10/2014.  Clinical history: Evaluate for sacroiliitis.  Technique: Multiplanar, multisequence MR imaging of the sacroiliac  joints were obtained using standard sequences in 2 orthogonal planes  before and after the uneventful administration of intravenous  gadolinium contrast.  Findings:  No significant fluid in the sacroiliac joints. No abnormal enhancement  to suggest sacroiliitis. No erosive changes are noted.  The muscle bulk is intact without significant atrophy or edema. The  visualized intrapelvic structures are unremarkable. No  lymphadenopathy. No full-thickness tear or tendon retraction.  No abnormal marrow signal to suggest fracture, osteonecrosis, or  marrow infiltration. Nonspecific subtle focus of T2 hyperintensity  within the left iliac bone, coronal series 3 image 10, likely  vascularity.  Large field-of-view limits  evaluation the hip joints. No  full-thickness cartilage loss or joint effusion. The visualized lower  lumbar spine is unremarkable.  IMPRESSION  Impression:  1. No findings to suggest sacroiliitis.  2. No abnormal marrow signal to suggest fracture, osteonecrosis, or  marrow infiltration.  AVELINA HIGGINBOTHAM MD  Component      Latest Ref Rng & Units 12/16/2016   Cardiolipin Antibody IgG      0.0 - 19.9 GPL-U/mL 3.2   Cardiolipin Antibody IgM      0.0 - 19.9 MPL-U/mL 27.7 (H)   Cardiolipin Antibody IgA      0.0 - 19.9 APL U/mL 5.7   Beta 2 Glycoprotein 1 Antibody IgA      <7 U/mL 1.6     Component      Latest Ref Rng & Units 10/13/2017   WBC      4.0 - 11.0 10e9/L 7.3   RBC Count      3.8 - 5.2 10e12/L 4.13   Hemoglobin      11.7 - 15.7 g/dL 12.6   Hematocrit      35.0 - 47.0 % 37.5   MCV      78 - 100 fl 91   MCH      26.5 - 33.0 pg 30.5   MCHC      31.5 - 36.5 g/dL 33.6   RDW      10.0 - 15.0 % 11.8   Platelet Count      150 - 450 10e9/L 283   Diff Method       Automated Method   % Neutrophils      % 63.1   % Lymphocytes      % 25.2   % Monocytes      % 9.0   % Eosinophils      % 1.6   % Basophils      % 0.8   % Immature Granulocytes      % 0.3   Nucleated RBCs      0 /100 0   Absolute Neutrophil      1.6 - 8.3 10e9/L 4.6   Absolute Lymphocytes      0.8 - 5.3 10e9/L 1.8   Absolute Monocytes      0.0 - 1.3 10e9/L 0.7   Absolute Eosinophils      0.0 - 0.7 10e9/L 0.1   Absolute Basophils      0.0 - 0.2 10e9/L 0.1   Abs Immature Granulocytes      0 - 0.4 10e9/L 0.0   Absolute Nucleated RBC       0.0   Color Urine       Yellow   Appearance Urine       Slightly Cloudy   Glucose Urine      NEG:Negative mg/dL Negative   Bilirubin Urine      NEG:Negative Negative   Ketones Urine      NEG:Negative mg/dL Negative   Specific Gravity Urine      1.003 - 1.035 1.015   Blood Urine      NEG:Negative Negative   pH Urine      5.0 - 7.0 pH 5.0   Protein Albumin Urine      NEG:Negative mg/dL Negative   Urobilinogen mg/dL      0.0 - 2.0  mg/dL 0.0   Nitrite Urine      NEG:Negative Negative   Leukocyte Esterase Urine      NEG:Negative Small (A)   Source       Midstream Urine   WBC Urine      0 - 2 /HPF 2   RBC Urine      0 - 2 /HPF 1   Bacteria Urine      NEG:Negative /HPF Few (A)   Squamous Epithelial /HPF Urine      0 - 1 /HPF 4 (H)   Mucous Urine      NEG:Negative /LPF Present (A)   Creatinine      0.52 - 1.04 mg/dL 0.91   GFR Estimate      >60 mL/min/1.7m2 67   GFR Estimate If Black      >60 mL/min/1.7m2 82   Cardiolipin Antibody IgG      0.0 - 19.9 GPL-U/mL 3.5   Cardiolipin Antibody IgM      0.0 - 19.9 MPL-U/mL 27.0 (H)   Protein Random Urine      g/L 0.18   Protein Total Urine g/gr Creatinine      0 - 0.2 g/g Cr 0.15   NANCY interpretation      NEG:Negative Negative   NANCY titer 1       1:40   Lupus Result      NEG:Negative Negative   Beta 2 Glycoprotein 1 Antibody IgM      <7 U/mL 8.9 (H)   Beta 2 Glycoprotein 1 Antibody IgG      <7 U/mL <0.6   AST      0 - 45 U/L 19   ALT      0 - 50 U/L 35   Albumin      3.4 - 5.0 g/dL 4.0   Complement C4      15 - 50 mg/dL 19   Complement C3      76 - 169 mg/dL 131   CRP Inflammation      0.0 - 8.0 mg/L <2.9   Sed Rate      0 - 20 mm/h 18   DNA-ds      <10 IU/mL 4   Creatinine Urine      mg/dL 124     Component      Latest Ref Rng & Units 10/11/2019   Vitamin D Deficiency screening      20 - 75 ug/L 85 (H)   SSA (Ro) (ANA CRISTINA) Antibody, IgG      0.0 - 0.9 AI <0.2   SSB (La) (ANA CRISTINA) Antibody, IgG      0.0 - 0.9 AI <0.2   NANCY interpretation      NEG:Negative Negative   DNA-ds      <10 IU/mL 4   Complement C3      76 - 169 mg/dL 119   Complement C4      15 - 50 mg/dL 15     HISTORY REVIEW:  Past Medical History:   Diagnosis Date     Anxiety 2013     Asthma      Chronic sinusitis 2018     Hypertension      Inflammatory arthritis      Past Surgical History:   Procedure Laterality Date     CHOLECYSTECTOMY       GALLBLADDER SURGERY       GYN SURGERY  2018    both my Fallopian tube, and right ovary, and appendix      HYSTERECTOMY Right     Partial- right ovary removed     TONSILLECTOMY       TONSILLECTOMY  1993     Family History   Problem Relation Age of Onset     Lupus Paternal Aunt         father had double lung transplant for alpha 1 anti-trypsin def     Arthritis Maternal Grandmother         RA     Hypertension Maternal Grandmother      Depression Maternal Grandmother      Arthritis Paternal Grandfather         RA     Family History Negative Other         neg for AS, psoriasis     Gastrointestinal Disease Other         cousin has colitis     Diabetes Father         due to transplant     Allergies Father      Diabetes Other         Aunts on both sides     Hypertension Mother      Allergies Mother      Hypertension Maternal Grandfather      Arthritis Maternal Grandfather      Arthritis Paternal Grandmother      Other - See Comments Other         fibromyalgia - paternal aunt     Diabetes Other      Social History     Socioeconomic History     Marital status:      Spouse name: Not on file     Number of children: 0     Years of education: Not on file     Highest education level: Not on file   Occupational History     Occupation:      Employer: [employer]   Social Needs     Financial resource strain: Not on file     Food insecurity     Worry: Not on file     Inability: Not on file     Transportation needs     Medical: Not on file     Non-medical: Not on file   Tobacco Use     Smoking status: Never Smoker     Smokeless tobacco: Never Used   Substance and Sexual Activity     Alcohol use: Yes     Comment: occ     Drug use: No     Sexual activity: Yes     Partners: Male     Birth control/protection: None   Lifestyle     Physical activity     Days per week: Not on file     Minutes per session: Not on file     Stress: Not on file   Relationships     Social connections     Talks on phone: Not on file     Gets together: Not on file     Attends Druze service: Not on file     Active member of club or organization: Not on  file     Attends meetings of clubs or organizations: Not on file     Relationship status: Not on file     Intimate partner violence     Fear of current or ex partner: Not on file     Emotionally abused: Not on file     Physically abused: Not on file     Forced sexual activity: Not on file   Other Topics Concern     Not on file   Social History Narrative    2019        ENVIRONMENTAL HISTORY: [place] home is heated with a gas furnace and infloor heating. They do have central air conditioning. The patient's bedroom is furnished with carpeting in bedroom and fabric window coverings.  No pets inside the house. There is no history of cockroach or mice infestation. There are no smokers in the house.  The house does not have a basement.        PMHx, FHx, SHx were reviewed, unchanged.    Pregnancy Hx: She is  s/p one miscarriage around 12 wk    Outpatient Encounter Medications as of 2020   Medication Sig Dispense Refill     albuterol (VENTOLIN HFA) 108 (90 BASE) MCG/ACT inhaler Inhale 2 Puffs into the lungs four times a day as needed for Wheezing or Shortness of Breath (as needed). Shake before using.       budesonide (RINOCORT AQUA) 32 MCG/ACT nasal spray Spray 1 spray into both nostrils daily Follow-up needed for further refills. 8.6 Bottle 0     escitalopram (LEXAPRO) 10 MG tablet Take 10 mg by mouth daily  0     hydroxychloroquine (PLAQUENIL) 200 MG tablet TAKE 1 TABLET (200 MG) BY MOUTH 2 TIMES DAILY 180 tablet 1     labetalol (NORMODYNE) 200 MG tablet Take 100 mg by mouth 2 times daily       leuprolide acetate (LUPRON) 1 MG/0.2ML kit INJECT 20 UNITS SC D AND DECREASE UTD       metFORMIN (GLUCOPHAGE) 500 MG tablet        montelukast (SINGULAIR) 10 MG tablet Take 1 tablet by mouth daily  3     norethindrone-ethinyl estradiol-iron (ESTROSTEP FE) 1-20/1-30/1-35 MG-MCG per tablet Take 1 tablet by mouth daily       Prenatal Multivit-Min-Fe-FA (PRENATAL  VITAMINS PO) With iron       tiZANidine (ZANAFLEX) 4 MG tablet Take 4 mg by mouth 3 times daily       traMADol (ULTRAM) 50 MG tablet Take 50 mg by mouth       VITAMIN D, CHOLECALCIFEROL, PO Take 2,000 Units by mouth daily       BUDESONIDE NA        cetirizine (ZYRTEC) 10 MG tablet Take 10 mg by mouth as needed for allergies       fluticasone (FLOVENT HFA) 110 MCG/ACT inhaler Inhale 2 Puffs into the lungs two times a day. 4 puffs twice daily in yellow zone       hydrOXYzine (ATARAX) 25 MG tablet Take 25 mg by mouth every 8 hours as needed for itching       ORILISSA 200 MG TABS TAKE 200 MG BY MOUTH TWO TIMES A DAY.  5     No facility-administered encounter medications on file as of 8/7/2020.      Allergies   Allergen Reactions     Tetanus Immune Globulin      Fever     Tetanus Toxoid            Ph.E:    Not done, phone visit    Assessment/ plan:    #seronegative inflammatory arthritis  - Per previous noteH/o seronegative IA Dx 1/2006 with flares of IA in hands, low back pain s/p Tx with prednisone, SSZ and lodine. Received MRI report of L index finger 4/06  which showed L index finger edema from PIP to DIP (non specific finding). Her work up at initial visit in 1/2014 was suggestive of seroneg non-erosive IA. She was recommended to increase lodine to 2 tab a day but could not tolerate it sec GI upset. Has FM TP but FMS can't explain all her pain including pain/tenderness/swelling over PIP joints, AM stiffness> 1hr and good response to steroid/SSZ in the past. For AI, recommended a trial of HCQ. She is on HCQ since 3/2014 with excellent response. Her IA is under excellent control on HCQ. R shoulder pain is most likely due to bursitis, she wants to hold off getting local steroid shot. Advised rhoda to try voltaren gel.    -neg HLA-B27 and neg pelvic MRI 10/2015 for sacroiliitis, SI joint pain resolved.      - continue plaquenil 200mg PO BID, was informed that's safe in pregnancy    -Recommend eye exam for HCQ monitoring.  Eye exam is updated (10/2019).    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 She was put on ASA+lovenox during IVF. She failed IVF fresh embryo (two) and leftover frozen embryo (one) transfer and was told given her age her best option would be to use donor embryo. She is recommended to use ASA 81 mg qd+lovenox during future pregnancy with donor embryo and I agree given her h/o miscarriage at 12 wk. She has no h/o thrombosis. She still does not meet criteria for APS syndrome as miscarriage was still considered 1st trimester miscarriage.    Had embryo transfer on 10/29/2019, unfortunately miscarried at 3 months despite being on ASA+Lovenox. Reportedly, baby stopped growing. as upcoming embryo transfer on 9/1/2020,  This is her last donor embryo.    She always had neg NANCY here and no features of SLE.    Labs today on 8/10/2020 at [facility], orders were faxed.    Bengay arthritis or voltaren gel for R shoulder pain. Consider PT and shoulder injection      Return in 6 months        MEDICATION CHANGES: as above    Orders Placed This Encounter   Procedures     ALT     Albumin level     AST     CBC with platelets differential     Creatinine     Complement C4     Complement C3     CRP inflammation     DNA double stranded antibodies     Erythrocyte sedimentation rate auto     UA with Microscopic reflex to Culture     Protein  random urine with Creat Ratio     Creatinine random urine     Vitamin D Deficiency         Phone visit: 15  min        HPI      ROS      Physical Exam

## 2020-08-07 NOTE — LETTER
"8/7/2020       RE: Gwendolyn Melgar  [address] [facility] [address]     Dear Colleague,    Thank you for referring your patient, Gwendolyn Melgar, to the [facility] RHEUMATOLOGY at [facility]. Please see a copy of my visit note below.    Gwendolyn Melgar is a 44 year old female who is being evaluated via a billable telephone visit.      The patient has been notified of following:     \"This telephone visit will be conducted via a call between you and your physician/provider. We have found that certain health care needs can be provided without the need for a physical exam.  This service lets us provide the care you need with a short phone conversation.  If a prescription is necessary we can send it directly to your pharmacy.  If lab work is needed we can place an order for that and you can then stop by our lab to have the test done at a later time.    Telephone visits are billed at different rates depending on your insurance coverage. During this emergency period, for some insurers they may be billed the same as an in-person visit.  Please reach out to your insurance provider with any questions.    If during the course of the call the physician/provider feels a telephone visit is not appropriate, you will not be charged for this service.\"    Patient has given verbal consent for Telephone visit?  Yes    What phone number would you like to be contacted at? [phone]    How would you like to obtain your AVS? MyChart    Phone call duration: 15 minutes    Marie Charles MD        Rheumatology F/U Virtual Visit Note    Date of last visit: 10/11/2019  Today's visit date: 8/7/2020    Reason for visit: Seronegative non-erosive inflammatory arthritis    (this is a phone visit due to COVID-19 outbreak), patient agreed    HPI from initial visit    Gwendolyn Melgar is a 38 yo WF who was referred to our clinic for evaluation and management of her IA.    On 1/30/2006, she woke up with pain/swelling of " L 2nd finger. Was treated with prednisone and reportedly L hand MRI showed inflammatory arthritis. She had neg HLA-B27, NANCY, RF and anti-CCP in 2006. She was referred to rheumatology and was put on SSZ (? dose, ?2-3 tab twice a day). She was able to make a full fist and her joint sx improved. She was on that x 6 months and was told to stop it. Her arthritis was under good control x 3 yr.     In 2010, started having low back pain. She had problem with low back pain and getting out of the chair. At that time, had hand AM stiffness in AM to the point that could not open her hands. She was put on lodine. It helped her. Was told to taper the dose from 2 a day to 1 a day.    Last summer, they bought a new house and her father had double lung transplant. She started having low back pain in 10/2013, had severe pain to the point that she could not get off the floor without help. She was prescribed prednisone 20 max over 20 days. It helped her. Had unremarkable L hip x-ray. Never had SI joint x-ray or MRI.    Was recommended to start anti-TNF Tx but she is concerned about side effects and is seeking second opinion.      Reports fatigue. Currently has a cold with dry cough. Has h/o alopecia areata, has thin hair. Has occasional diarrhea sec anxiety, anxiety on lexapro, myalgia, occasional numbness/tingling ta night in hands which wakes her up at night.    Today, has pain around shoulders and hips which could be sec sitting at desc and cold.    Interval hx: Her extensive rheumatologic work up at initial visit was negative. I diagnosed her with seroneg non-erosive IA and advised her to increase lodine from 1 to 2 a day which she did but could not tolerate it a sit caused GI upset. She had pain/tenderness/swelling of PIP joints with AM stiffness> 1 hr. Prescribed her  mg bid in 3/2014, she reports significant improvement of joint sx on it. Tolerates it well. Was also put her on cymbalta at the same time helped with her  mood and back pain. She stopped cymbalta as had pregnancy plan, reports occasional LBP off cymbalta. Had leg cramps at last visit, zanaflex was prescribed but leg cramps resolved on its won and she did not try the zanaflex. R hip bursitis improved after doing PT.    She is feeling well today. Just found out to be pregnant on Maddox's day. She is 6 wk pregnant, is excited about it. Her TG is 10/17/2015. She is due for eye exam in 9/2015. She is still taking the HCQ and is helping, no joint pain/swelling or AM stiffness. Had pain over R foot on walking about 6 wk ago when she was in [place], it eventually resolved on its own.        12/2016: Doing well with no joint pain or arthritis flare. Now is going through IVF, had one round in 11/2016 which was not successful, now is going to do another round in January 2017. She developed R sciatica pain after 1st round of IVF which resolved after working with chiropractor. 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 I don't have her test results, she thinks they were tested only once.      10/2017: No arthritis flare up since last visit, doing well. Has ongoing aching around shoulders and low back which is intermittent, started to see a chiropractor and it's helping. She failed IVF, one fresh embryo (x2 embryos) transfer and one frozen embryo transfer. Now is on waiting list to get donor embryo, is hoping to get one by March 2018. 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 Her OB believes that + APS antibodies might explain the fetal loss at 12 wk when Gwendolyn got pregnant without assistance; however it was never confirmed, no genetic testing was done on fetus.    She reports no recurrence of hand stiffness or pain  since she started taking HCQ.    10/2018: 2wk ago, started to hurt over R shoulder pain and L 2nd finger pain. Otherwise had a good yr. Taking a hot bath helps. Tylenol or ibuprofen helps. zanaflex helps with leg cramps and pain.    Sometimes gets canker sores.    No major fatigue.    Overall doing fairly well with no major arthritis flare up on HCQ, waiting for donor embryo. On 8/3/2018, had surgery for endometriosis, it was converted to open laparotomy, right salpingo-oophorectomy. Appendectomy. Left salpingectomy      10/11/2019: R shoulder was hurting last winter, got better and now the pain is back. When it gets cold, her 5th fingers hurt. R 5th PIP hurt. No joint swelling.     Hip pain is good.    She does stretching exercises.    Now goes to walking track at high school and also does bowling.    Last eye exam was on 10/4/2019.    In the process of getting donated embryo transferred (10/29/2019), excited about it.    Today 2020: Had 2 donated embryos trannsferred in 10/2019. Was able too kept one and had a positive pregnacy test. Unfortunately miscarried at 12 wk. At 8 wk baby stopped. Was on ASA and lovenox the whole time.    She started another cycle and is going to have last left over donated embryo transferred on 2020.    Her hips, knees ache with activity sometimes but pin is mild.    Hands are good. Sometimes her R 5th finger gets stuck.    R shoulder pain has not resolved, reports one tender spot over lateral side of R shoulder, hs full ROM.    Eye exam was done in 10/2019.    ROS:  A comprehensive ROS was done, positives are per HPI.      Interval History (2015)  Pt states that she had a spontaneous  at approximately 12 weeks.  Since that time she has had a recurrence of her bilateral lower hip and back pain that started approximately 1-2 months after the miscarriage occurred.  Morning stiffness duration varies with activity and achiness that lasts for approximately on hour. Pt  states that he cali is curreny a 1-2/10 at its maximum, 3-4/10 at maximum.  Ice and physical therapy alleviated pain.  She is not taking any analgesics.  Achiness can return at the end of the day.  Pt states taht she feels taht her symptoms are being well controlled on plaquenil and that she is not experiencing any side effects.  She states that she would like to remain on the plaquenil.  She is trying to become pregnant again, so she does not want to initiate any medications that are potentially teratogenic.  She states that she doesn't think that she needs a short course of prednisone to deal with her current symptoms    Pt states that in February she had an acute attack of right 1st metatarsophalangeal joint pain.  Pt denies callor, edema, or warmth.  Pt states that the pain is severe to the point that she can't walk on it.  Pain is worse than her normal arthritis pain.  Pt does not drink.  No association with seafood, organ meats.  Pt states that she was eating a lot of red meats during that period.     ROS:  Affirms SI joint and trochanteric pain with 1 hour of morning stiffness.  Denies HA, fevers, chills, night sweats, changes in vision, sicca symptoms, ulcers, epistaxis, lumps and bumps in neck, chest pain, palpitations, SOB, stomach pain, n/v/d, constipation, hematochezia, melena, dysuria, hematuria, weakness of muscles, new rashes, raynaud's.    Today: Had neg HLA-B27 and neg pelvic MRI for sacroiliitis. Her SI joint pain resolved. Reports intermittent B/L shoulder pain and R hip bursitis pain but overall her pain much less and she did not have as much pain and problem with her joints this past winter. After having a miscarriage at 11 wk, now has infertility problem. Reports developing ovarian cyst after taking clomiphene, now is on both metformin and OC, will try fertility meds again after resolution of cysts. She reports having diarrhea on metformin but now it's better.      Her R shoulder still  hurts, santoyo hard timee sleeping on that side. Has full ROM of the shulder. Has not tried bengay.    Her limited rheumatology records were reviewed.    This Document is currently in Final Status  Exam Accession# [id number]     Signs and Symptoms for Radiological Exam from IDX:       * hip pain  pt states left hip pain no injury  [id number]  History from IDX:       * ARTHROPATHY, UNSPECIFIED, SITE UNSPECIFIED; PAIN IN JOINT, PELVIC REGION AND THIGH  AP PELVIS AND LATERAL VIEW LEFT HIP 11/05/2012     COMPARISON: AP pelvis 05/11/2006.     FINDINGS: The pelvic ring is intact. Normal alignment of the left hip.  The joint spaces are symmetric. Tiny accessory ossicle at the lateral  aspect of the left acetabular roof. This is stable from the previous  study.          Examination Interpreted at: [place], [place]  The Interpreting Physician is FERNANDO DAVIS  Exam was completed at [facility]  Component      Latest Ref Rng 1/23/2014 1/23/2014          12:00 AM 12:00 AM   Sodium      137 - 145 mmol/L 137    Potassium      3.6 - 5.0 mmol/L 4.4    Chloride      98 - 107 mmol/L 99    CARBON DIOXIDE, TOTAL      22 - 35 mmol/L 26    Anion Gap       12    Glucose      65 - 100 mg/dL 100    Urea Nitrogen      7 - 20 mg/dL 14    Creatinine      0.5 - 1.0 mg/dL 0.7    Calcium      8.4 - 10.2 mg/dL 9.3    Protein Total      6.3 - 8.2 g/dL 7.1    Albumin      3.5 - 5.0 g/dL 4.2    Bilirubin Total      0.2 - 1.3 mg/dL 0.5    Alkaline Phosphatase      38 - 126 U/L 47    AST      14 - 59 U/L 26    ALT      9 - 72 U/L 28    RNP Antibodies      0.0 - 0.9 AI <0.2 <0.2   Kevin Antibodies      0.0 - 0.9 AI <0.2 <0.2   Scleroderma Antibody Scl-70 ANA CRISTINA IgG      0.0 - 0.9 AI  <0.2   Sjogren's Anti-SS-A      0.0 - 0.9 AI  <0.2   Sjogren's Anti-SS-B      0.0 - 0.9 AI  <0.2   Antichromatin Antibodies      0.0 - 0.9 AI  <0.2   Anti-Laura-1      0.0 - 0.9 Al  <0.2   Centomere B Atb      0.0 - 0.9 AI  <0.2    QuantiFERON TB Gold       Neg    QuantiFERON TB Ag Value       0.05    QuantiFERON Nil Value       0.05    QuantiFERON Mitogen Value       >10.00    QFT TB Ag minus Nil Value       0.00    NANCY Screen by EIA       Neg    Hepatitis C PANKAJ      0.0 - 0.9 0.1    Vitamin D,25-Hydroxy      30.0 - 100.0 ng/mL 34.8    HBsAg Screen       Neg    Hepatitis B Core Antibody       Neg    Complement C4      9 - 36 16    Complement C3      90 - 180 153    Rheumatoid Factor      0.0 - 13.9 KIU/L 9.8    C-Reactive Protein      0.0 - 4.9 mg/dL 2.7    Anti-DNA (DS) Ab Qn      0 - 9 IU/mL 9    CCP Antibodies      0 - 19 U/mL 3      Exam: Bilateral wrists, 3 views each. 1/10/2014.    Comparison: None.    Clinical history: Arthropathy.    Findings: 3 views each of the bilateral wrists were obtained. Joint  spaces are well-maintained. No erosive changes are noted. No soft  tissue abnormalities are seen.            Result Impression       Impression: No erosive changes in the bilateral wrists.    AVELINA HIGGINBOTHAM MD  I have personally reviewed the image and initial interpretation, and I  agree with findings.     Exam: Bilateral hands, 3 views each. 1/10/2014.    Comparison: None.    Clinical history: Arthropathy.    Findings: 3 views each of the bilateral hands were obtained. The joint  spaces are well-maintained. No soft tissue abnormalities are noted. No  erosive changes are seen.            Result Impression       Impression: No erosive changes in the bilateral hands.    AVELINA HIGGINBOTHAM MD  I have personally reviewed the image and initial interpretation, and I  agree with findings.     Exam: Sacroiliac joints, 3 views. 1/10/2014.    Comparison: None.    Clinical history: Arthropathy.    Findings: 3 views of the sacroiliac joints were obtained. The  sacroiliac joints are patent. No abnormal sclerosis is noted. No  definite erosive changes are seen.            Result Impression       Impression: No acute bone abnormality in the sacroiliac  joints.    AVELINA HIGGINBOTHAM MD  I have personally reviewed the image and initial interpretation, and I  agree with findings.          Component      Latest Ref Rng 2/20/2015   WBC      4.0 - 11.0 10e9/L 11.1 (H)   RBC Count      3.8 - 5.2 10e12/L 4.28   Hemoglobin      11.7 - 15.7 g/dL 13.1   Hematocrit      35.0 - 47.0 % 38.9   MCV      78 - 100 fl 91   MCH      26.5 - 33.0 pg 30.6   MCHC      31.5 - 36.5 g/dL 33.7   RDW      10.0 - 15.0 % 13.1   Platelet Count      150 - 450 10e9/L 238   Diff Method       Automated Method   % Neutrophils       66.4   % Lymphocytes       21.7   % Monocytes       9.6   % Eosinophils       1.5   % Basophils       0.4   % Immature Granulocytes       0.4   Absolute Neutrophil      1.6 - 8.3 10e9/L 7.4   Absolute Lymphocytes      0.8 - 5.3 10e9/L 2.4   Absolute Monoctyes      0.0 - 1.3 10e9/L 1.1   Absolute Eosinophils      0.0 - 0.7 10e9/L 0.2   Absolute Basophils      0.0 - 0.2 10e9/L 0.0   Abs Immature Granulocytes      0 - 0.4 10e9/L 0.0   Creatinine      0.52 - 1.04 mg/dL 0.72   GFR Estimate      >60 mL/min/1.7m2 90   GFR Estimate If Black      >60 mL/min/1.7m2 >90 . . .   CRP Inflammation      0.0 - 8.0 mg/L 3.5   Sed Rate      0 - 20 mm/h 9   Albumin      3.4 - 5.0 g/dL 3.8   ALT      0 - 50 U/L 67 (H)   AST      0 - 45 U/L 28     Component      Latest Ref Rng 8/21/2015   WBC      4.0 - 11.0 10e9/L 7.9   RBC Count      3.8 - 5.2 10e12/L 4.50   Hemoglobin      11.7 - 15.7 g/dL 13.8   Hematocrit      35.0 - 47.0 % 40.0   MCV      78 - 100 fl 89   MCH      26.5 - 33.0 pg 30.7   MCHC      31.5 - 36.5 g/dL 34.5   RDW      10.0 - 15.0 % 12.6   Platelet Count      150 - 450 10e9/L 273   Diff Method       Automated Method   % Neutrophils       66.4   % Lymphocytes       23.6   % Monocytes       8.0   % Eosinophils       1.3   % Basophils       0.3   % Immature Granulocytes       0.4   Absolute Neutrophil      1.6 - 8.3 10e9/L 5.3   Absolute Lymphocytes      0.8 - 5.3 10e9/L 1.9   Absolute  Monoctyes      0.0 - 1.3 10e9/L 0.6   Absolute Eosinophils      0.0 - 0.7 10e9/L 0.1   Absolute Basophils      0.0 - 0.2 10e9/L 0.0   Abs Immature Granulocytes      0 - 0.4 10e9/L 0.0   Creatinine      0.52 - 1.04 mg/dL 0.78   GFR Estimate      >60 mL/min/1.7m2 81   GFR Estimate If Black      >60 mL/min/1.7m2 >90 . . .   [id number] Method       SSOP   B locus       B27 Neg   CRP Inflammation      0.0 - 8.0 mg/L <2.9   Sed Rate      0 - 20 mm/h 11   Albumin      3.4 - 5.0 g/dL 4.2   ALT      0 - 50 U/L 50   AST      0 - 45 U/L 29   HLA B27 Typing       Specimen received - Immunology report to follow upon completion.   Exam: MRI of the sacroiliac joints dated 10/21/2015.  Comparison: 1/10/2014.  Clinical history: Evaluate for sacroiliitis.  Technique: Multiplanar, multisequence MR imaging of the sacroiliac  joints were obtained using standard sequences in 2 orthogonal planes  before and after the uneventful administration of intravenous  gadolinium contrast.  Findings:  No significant fluid in the sacroiliac joints. No abnormal enhancement  to suggest sacroiliitis. No erosive changes are noted.  The muscle bulk is intact without significant atrophy or edema. The  visualized intrapelvic structures are unremarkable. No  lymphadenopathy. No full-thickness tear or tendon retraction.  No abnormal marrow signal to suggest fracture, osteonecrosis, or  marrow infiltration. Nonspecific subtle focus of T2 hyperintensity  within the left iliac bone, coronal series 3 image 10, likely  vascularity.  Large field-of-view limits evaluation the hip joints. No  full-thickness cartilage loss or joint effusion. The visualized lower  lumbar spine is unremarkable.  IMPRESSION  Impression:  1. No findings to suggest sacroiliitis.  2. No abnormal marrow signal to suggest fracture, osteonecrosis, or  marrow infiltration.  AVELINA HIGGINBOTHAM MD  Component      Latest Ref Rng & Units 12/16/2016   Cardiolipin Antibody IgG      0.0 - 19.9 GPL-U/mL 3.2    Cardiolipin Antibody IgM      0.0 - 19.9 MPL-U/mL 27.7 (H)   Cardiolipin Antibody IgA      0.0 - 19.9 APL U/mL 5.7   Beta 2 Glycoprotein 1 Antibody IgA      <7 U/mL 1.6     Component      Latest Ref Rng & Units 10/13/2017   WBC      4.0 - 11.0 10e9/L 7.3   RBC Count      3.8 - 5.2 10e12/L 4.13   Hemoglobin      11.7 - 15.7 g/dL 12.6   Hematocrit      35.0 - 47.0 % 37.5   MCV      78 - 100 fl 91   MCH      26.5 - 33.0 pg 30.5   MCHC      31.5 - 36.5 g/dL 33.6   RDW      10.0 - 15.0 % 11.8   Platelet Count      150 - 450 10e9/L 283   Diff Method       Automated Method   % Neutrophils      % 63.1   % Lymphocytes      % 25.2   % Monocytes      % 9.0   % Eosinophils      % 1.6   % Basophils      % 0.8   % Immature Granulocytes      % 0.3   Nucleated RBCs      0 /100 0   Absolute Neutrophil      1.6 - 8.3 10e9/L 4.6   Absolute Lymphocytes      0.8 - 5.3 10e9/L 1.8   Absolute Monocytes      0.0 - 1.3 10e9/L 0.7   Absolute Eosinophils      0.0 - 0.7 10e9/L 0.1   Absolute Basophils      0.0 - 0.2 10e9/L 0.1   Abs Immature Granulocytes      0 - 0.4 10e9/L 0.0   Absolute Nucleated RBC       0.0   Color Urine       Yellow   Appearance Urine       Slightly Cloudy   Glucose Urine      NEG:Negative mg/dL Negative   Bilirubin Urine      NEG:Negative Negative   Ketones Urine      NEG:Negative mg/dL Negative   Specific Gravity Urine      1.003 - 1.035 1.015   Blood Urine      NEG:Negative Negative   pH Urine      5.0 - 7.0 pH 5.0   Protein Albumin Urine      NEG:Negative mg/dL Negative   Urobilinogen mg/dL      0.0 - 2.0 mg/dL 0.0   Nitrite Urine      NEG:Negative Negative   Leukocyte Esterase Urine      NEG:Negative Small (A)   Source       Midstream Urine   WBC Urine      0 - 2 /HPF 2   RBC Urine      0 - 2 /HPF 1   Bacteria Urine      NEG:Negative /HPF Few (A)   Squamous Epithelial /HPF Urine      0 - 1 /HPF 4 (H)   Mucous Urine      NEG:Negative /LPF Present (A)   Creatinine      0.52 - 1.04 mg/dL 0.91   GFR Estimate      >60  mL/min/1.7m2 67   GFR Estimate If Black      >60 mL/min/1.7m2 82   Cardiolipin Antibody IgG      0.0 - 19.9 GPL-U/mL 3.5   Cardiolipin Antibody IgM      0.0 - 19.9 MPL-U/mL 27.0 (H)   Protein Random Urine      g/L 0.18   Protein Total Urine g/gr Creatinine      0 - 0.2 g/g Cr 0.15   NANCY interpretation      NEG:Negative Negative   NANCY titer 1       1:40   Lupus Result      NEG:Negative Negative   Beta 2 Glycoprotein 1 Antibody IgM      <7 U/mL 8.9 (H)   Beta 2 Glycoprotein 1 Antibody IgG      <7 U/mL <0.6   AST      0 - 45 U/L 19   ALT      0 - 50 U/L 35   Albumin      3.4 - 5.0 g/dL 4.0   Complement C4      15 - 50 mg/dL 19   Complement C3      76 - 169 mg/dL 131   CRP Inflammation      0.0 - 8.0 mg/L <2.9   Sed Rate      0 - 20 mm/h 18   DNA-ds      <10 IU/mL 4   Creatinine Urine      mg/dL 124     Component      Latest Ref Rng & Units 10/11/2019   Vitamin D Deficiency screening      20 - 75 ug/L 85 (H)   SSA (Ro) (ANA CRISTINA) Antibody, IgG      0.0 - 0.9 AI <0.2   SSB (La) (ANA CRISTINA) Antibody, IgG      0.0 - 0.9 AI <0.2   NANCY interpretation      NEG:Negative Negative   DNA-ds      <10 IU/mL 4   Complement C3      76 - 169 mg/dL 119   Complement C4      15 - 50 mg/dL 15     HISTORY REVIEW:  Past Medical History:   Diagnosis Date     Anxiety 2013     Asthma      Chronic sinusitis 2018     Hypertension      Inflammatory arthritis      Past Surgical History:   Procedure Laterality Date     CHOLECYSTECTOMY       GALLBLADDER SURGERY       GYN SURGERY  2018    both my Fallopian tube, and right ovary, and appendix     HYSTERECTOMY Right     Partial- right ovary removed     TONSILLECTOMY       TONSILLECTOMY  1993     Family History   Problem Relation Age of Onset     Lupus Paternal Aunt         father had double lung transplant for alpha 1 anti-trypsin def     Arthritis Maternal Grandmother         RA     Hypertension Maternal Grandmother      Depression Maternal Grandmother      Arthritis Paternal Grandfather         RA     Family  History Negative Other         neg for AS, psoriasis     Gastrointestinal Disease Other         cousin has colitis     Diabetes Father         due to transplant     Allergies Father      Diabetes Other         Aunts on both sides     Hypertension Mother      Allergies Mother      Hypertension Maternal Grandfather      Arthritis Maternal Grandfather      Arthritis Paternal Grandmother      Other - See Comments Other         fibromyalgia - paternal aunt     Diabetes Other      Social History     Socioeconomic History     Marital status:      Spouse name: Not on file     Number of children: 0     Years of education: Not on file     Highest education level: Not on file   Occupational History     Occupation:      Employer: [employer]   Social Needs     Financial resource strain: Not on file     Food insecurity     Worry: Not on file     Inability: Not on file     Transportation needs     Medical: Not on file     Non-medical: Not on file   Tobacco Use     Smoking status: Never Smoker     Smokeless tobacco: Never Used   Substance and Sexual Activity     Alcohol use: Yes     Comment: occ     Drug use: No     Sexual activity: Yes     Partners: Male     Birth control/protection: None   Lifestyle     Physical activity     Days per week: Not on file     Minutes per session: Not on file     Stress: Not on file   Relationships     Social connections     Talks on phone: Not on file     Gets together: Not on file     Attends [employer] service: Not on file     Active member of club or organization: Not on file     Attends meetings of clubs or organizations: Not on file     Relationship status: Not on file     Intimate partner violence     Fear of current or ex partner: Not on file     Emotionally abused: Not on file     Physically abused: Not on file     Forced sexual activity: Not on file   Other Topics Concern     Not on file   Social History Narrative    August 12, 2019        ENVIRONMENTAL HISTORY: [place] home is heated with a gas furnace and infloor heating. They do have central air conditioning. The patient's bedroom is furnished with carpeting in bedroom and fabric window coverings.  No pets inside the house. There is no history of cockroach or mice infestation. There are no smokers in the house.  The house does not have a basement.        PMHx, FHx, SHx were reviewed, unchanged.    Pregnancy Hx: She is  s/p one miscarriage around 12 wk    Outpatient Encounter Medications as of 2020   Medication Sig Dispense Refill     albuterol (VENTOLIN HFA) 108 (90 BASE) MCG/ACT inhaler Inhale 2 Puffs into the lungs four times a day as needed for Wheezing or Shortness of Breath (as needed). Shake before using.       budesonide (RINOCORT AQUA) 32 MCG/ACT nasal spray Spray 1 spray into both nostrils daily Follow-up needed for further refills. 8.6 Bottle 0     escitalopram (LEXAPRO) 10 MG tablet Take 10 mg by mouth daily  0     hydroxychloroquine (PLAQUENIL) 200 MG tablet TAKE 1 TABLET (200 MG) BY MOUTH 2 TIMES DAILY 180 tablet 1     labetalol (NORMODYNE) 200 MG tablet Take 100 mg by mouth 2 times daily       leuprolide acetate (LUPRON) 1 MG/0.2ML kit INJECT 20 UNITS SC D AND DECREASE UTD       metFORMIN (GLUCOPHAGE) 500 MG tablet        montelukast (SINGULAIR) 10 MG tablet Take 1 tablet by mouth daily  3     norethindrone-ethinyl estradiol-iron (ESTROSTEP FE) 1-20/1-30/1-35 MG-MCG per tablet Take 1 tablet by mouth daily       Prenatal Multivit-Min-Fe-FA (PRENATAL VITAMINS PO) With iron       tiZANidine (ZANAFLEX) 4 MG tablet Take 4 mg by mouth 3 times daily       traMADol (ULTRAM) 50 MG tablet Take 50 mg by mouth       VITAMIN D, CHOLECALCIFEROL, PO Take 2,000 Units by mouth daily       BUDESONIDE NA        cetirizine (ZYRTEC) 10 MG tablet Take 10 mg by mouth as needed for allergies       fluticasone (FLOVENT HFA) 110 MCG/ACT inhaler Inhale 2 Puffs into the lungs two times a day. 4  puffs twice daily in yellow zone       hydrOXYzine (ATARAX) 25 MG tablet Take 25 mg by mouth every 8 hours as needed for itching       ORILISSA 200 MG TABS TAKE 200 MG BY MOUTH TWO TIMES A DAY.  5     No facility-administered encounter medications on file as of 8/7/2020.      Allergies   Allergen Reactions     Tetanus Immune Globulin      Fever     Tetanus Toxoid            Ph.E:    Not done, phone visit    Assessment/ plan:    #seronegative inflammatory arthritis  - Per previous noteH/o seronegative IA Dx 1/2006 with flares of IA in hands, low back pain s/p Tx with prednisone, SSZ and lodine. Received MRI report of L index finger 4/06  which showed L index finger edema from PIP to DIP (non specific finding). Her work up at initial visit in 1/2014 was suggestive of seroneg non-erosive IA. She was recommended to increase lodine to 2 tab a day but could not tolerate it sec GI upset. Has FM TP but FMS can't explain all her pain including pain/tenderness/swelling over PIP joints, AM stiffness> 1hr and good response to steroid/SSZ in the past. For AI, recommended a trial of HCQ. She is on HCQ since 3/2014 with excellent response. Her IA is under excellent control on HCQ. R shoulder pain is most likely due to bursitis, she wants to hold off getting local steroid shot. Advised rhoda to try voltaren gel.    -neg HLA-B27 and neg pelvic MRI 10/2015 for sacroiliitis, SI joint pain resolved.      - continue plaquenil 200mg PO BID, was informed that's safe in pregnancy    -Recommend eye exam for HCQ monitoring. Eye exam is updated (10/2019).    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 She was put on ASA+lovenox during IVF. She failed IVF fresh embryo (two) and leftover frozen embryo (one) transfer and was told  given her age her best option would be to use donor embryo. She is recommended to use ASA 81 mg qd+lovenox during future pregnancy with donor embryo and I agree given her h/o miscarriage at 12 wk. She has no h/o thrombosis. She still does not meet criteria for APS syndrome as miscarriage was still considered 1st trimester miscarriage.    Had embryo transfer on 10/29/2019, unfortunately miscarried at 3 months despite being on ASA+Lovenox. Reportedly, baby stopped growing. as upcoming embryo transfer on 9/1/2020,  This is her last donor embryo.    She always had neg NANCY here and no features of SLE.    Labs today on 8/10/2020 at [facility], orders were faxed.    Bengay arthritis or voltaren gel for R shoulder pain. Consider PT and shoulder injection      Return in 6 months        MEDICATION CHANGES: as above    Orders Placed This Encounter   Procedures     ALT     Albumin level     AST     CBC with platelets differential     Creatinine     Complement C4     Complement C3     CRP inflammation     DNA double stranded antibodies     Erythrocyte sedimentation rate auto     UA with Microscopic reflex to Culture     Protein  random urine with Creat Ratio     Creatinine random urine     Vitamin D Deficiency         Phone visit: 15  min        HPI      ROS      Physical Exam        Again, thank you for allowing me to participate in the care of your patient.      Sincerely,    Marie Charles MD

## 2020-08-07 NOTE — PATIENT INSTRUCTIONS
Try voltaren gel over the right shoulder, consider PT and shoulder injection    Labs on Monday     Return in 6 months

## 2020-08-07 NOTE — TELEPHONE ENCOUNTER
----- Message from Marie Charles MD sent at 8/7/2020  1:55 PM CDT -----  Regarding: AVS, labs on Mon  Try voltaren gel over the right shoulder, consider PT and shoulder injection    Labs on Monday     Return in 6 months    ##Mariela, she wants to do labs at her PCP's office on Mon 8/10, I did local print.  Would you please fax them for her? Here is the info:    [facility]    [address]  [address]  [place] [address]    Phone: [phone]  Fax: [phone]

## 2020-08-07 NOTE — PROGRESS NOTES
"Gwendolyn Melgar is a 44 year old female who is being evaluated via a billable telephone visit.      The patient has been notified of following:     \"This telephone visit will be conducted via a call between you and your physician/provider. We have found that certain health care needs can be provided without the need for a physical exam.  This service lets us provide the care you need with a short phone conversation.  If a prescription is necessary we can send it directly to your pharmacy.  If lab work is needed we can place an order for that and you can then stop by our lab to have the test done at a later time.    Telephone visits are billed at different rates depending on your insurance coverage. During this emergency period, for some insurers they may be billed the same as an in-person visit.  Please reach out to your insurance provider with any questions.    If during the course of the call the physician/provider feels a telephone visit is not appropriate, you will not be charged for this service.\"    Patient has given verbal consent for Telephone visit?  Yes    What phone number would you like to be contacted at? [phone]    How would you like to obtain your AVS? MyChart    Phone call duration: 15 minutes    Marie Charles MD      "

## 2020-08-12 LAB
% GRANULOCYTES: 71.3 %
ALBUMIN SERPL-MCNC: 3.9 G/DL
ALT SERPL-CCNC: 19 U/L
APPEARANCE UR: CLEAR
AST SERPL-CCNC: 30 U/L
BACTERIA URINE: NORMAL /HPF
BASOPHILS NFR BLD AUTO: NORMAL %
BILIRUB UR QL: NORMAL
COLOR UR: YELLOW
CREAT SERPL-MCNC: 0.8 MG/DL
EOSINOPHIL NFR BLD AUTO: NORMAL %
EPITHELIAL CELLS UR: NORMAL /LPF
ERYTHROCYTE [DISTWIDTH] IN BLOOD BY AUTOMATED COUNT: 11.3 %
ERYTHROCYTE [SEDIMENTATION RATE] IN BLOOD: 3 MM/HR
GFR SERPL CREATININE-BSD FRML MDRD: 77.34 ML/MIN/{1.73_M2}
GLUCOSE URINE: NORMAL MG/DL
GRANULOCYTES #: 3.9 K/UL
HCT VFR BLD AUTO: 34.7 %
HEMOGLOBIN: 12.2 G/DL
HGB UR QL: NORMAL
KETONES UR QL: NORMAL MG/DL
LYMPHOCYTES # BLD AUTO: 1.3 K/UL
LYMPHOCYTES NFR BLD AUTO: 23.7 %
MCH RBC QN AUTO: 30.4 PG
MCHC RBC AUTO-ENTMCNC: 35.1 G/DL
MCV RBC AUTO: 87 FL
MID #: 0.3 K/UL
MID %: 5 % (ref 0.1–24)
MONOCYTES NFR BLD AUTO: NORMAL %
MUCUS URINE (EXTERNAL): NORMAL /LPF
NEUTROPHILS NFR BLD AUTO: NORMAL %
NITRITE UR QL STRIP: NORMAL
PH UR STRIP: 7.5 PH (ref 5–7)
PLATELET COUNT - QUEST: 288 K/UL (ref 150–450)
PMV BLD: 9 FL
PROT UR QL: NORMAL MG/DL
RBC # BLD AUTO: 4.01 M/UL
RBC URINE: NORMAL /HPF
SP GR UR STRIP: 1.03 (ref 1–1.03)
UROBILINOGEN UR QL STRIP: 0.2 EU/DL (ref 0.2–1)
VITAMIN D TOTAL: 70.48 NG/ML
WBC # BLD AUTO: 5.5 K/UL
WBC #/AREA URNS HPF: NEGATIVE /[HPF]
WBC URINE: NORMAL /HPF

## 2020-08-12 NOTE — LETTER
September 23, 2020      Gwendolyn Melgar  [address]  [place] [address]        Dear ,    We are writing to inform you of your test results.    Labs look good.    Resulted Orders   CBC with platelets differential   Result Value Ref Range    WBC 5.5 5.0 - 10.0 K/uL    RBC Count 4.01 3.70 - 6.30 M/uL    Hemoglobin 12.2 12.0 - 16.0 g/dL    Hematocrit 34.7 37.0 - 47.0 %    MCV 87 80 - 98 fl    MCH 30.4 27.0 - 31.0 pg    MCHC 35.1 32.0 - 40.0 g/dL    RDW 11.3 11.5 - 14.5 %    Platelet Count 288 145 - 375 k/uL    % Neutrophils      % Lymphocytes 23.7 0.9 - 44.0 %    % Monocytes      % Eosinophils      % Basophils      MPV 9.0 8.0 - 11.0 fL    GRANULOCYTES # 3.9 1.4 - 9.0 K/uL    % Granulocytes 71.3 43.0 - 76.0 %    Lymphocytes # 1.3 0.9 - 5.4 K/uL    Mid # 0.3 0.0 - 1.8 K/uL    Mid % 5.0 0.0 - 18.0 %   ALT   Result Value Ref Range    ALT 19 9 - 72 U/L   Creatinine   Result Value Ref Range    Creatinine 0.8 0.5 - 1.0 mg/dL    GFR Estimate 77.341 CALC    GFR Estimate If Black 93.583 CALC   AST   Result Value Ref Range    AST 30 14 - 59 U/L   Albumin level   Result Value Ref Range    Albumin 3.9 3.5 - 5.0 g/dL   Vitamin D Total GH   Result Value Ref Range    Vitamin D Total 70.48 30.00 - 100.00 ng/mL   Erythrocyte sedimentation rate auto   Result Value Ref Range    Sed Rate 3 0 - 20 mm/hr   Urine, Microscopic ([facility])   Result Value Ref Range    Color Urine Yellow     Appearance Clear Clear    Glucose Urine Neg neg mg/dL    Bilirubin Urine Neg neg    Ketones Urine Other neg mg/dL      Comment:      Trace    Specific Gravity Urine 1.030 1.003 - 1.035      Comment:      >/=1.030    Blood Urine Neg neg    pH Urine 7.5 5.0 - 7.0 pH    Protein Urine Trace Negative mg/dL    Urobilinogen Urine 0.2 0.2 - 1.0 EU/dL    Nitrite Urine Neg NEG    Leukocytes Negative Negative    WBC Urine 3-5 0 - 5 /HPF    RBC Urine 0-2 0 - 5 /HPF    Epithelial Cells UR Moderate None Seen /LPF    Bacteria Urine Trace Negative /HPF    Mucus  Urine (External) Moderate Negative /LPF       If you have any questions or concerns, please call the clinic at the number listed above.       Sincerely,        Marie Charles MD

## 2020-08-17 LAB
ANTI-DNA (DS) AB QN: 6 IU/ML
C3 SERPL-MCNC: 164 MG/DL
C4 SERPL-MCNC: 16 MG/DL
CREATINE URINE: 151.3 MG/DL
CRP SERPL-MCNC: 2 MG/L (ref 0–0.8)
Lab: NO GROWTH TEXT
PROT UR QL STRIP.AUTO: 8.6 MG/DL
PROT/CREAT UR: 57 MG/G CREAT

## 2020-08-17 NOTE — LETTER
August 20, 2020      Gwendolyn Melgar  [address] [address]        Dear ,    We are writing to inform you of your test results.    They are normal.    Resulted Orders   Complement C3   Result Value Ref Range    Complement C3 164 82 - 167 MG/DL   Protein and Creatinine Random Urine ([facility])   Result Value Ref Range    Creatine Urine 151.3 NOT ESTAB. MG/DL    Protein 8.6 NOT ESTAB. MG/DL      Comment:      PROTEIN,]    PROTEIN CREAT RATIO 57 0 - 200 mg/g creat   CRP inflammation   Result Value Ref Range    C-Reactive Protein 2 0 - 10 MG/L      Comment:      C-REACTIVE PROTEIN, QUANT   Anti dsDNA Double Stranded PANKAJ ([facility])   Result Value Ref Range    Anti-DNA (DS) Ab Qn 6 0 - 9 IU/mL   Culture Urine ([facility])   Result Value Ref Range    CULTURE URINE - NOTE NO GROWTH Text      Comment:      CLEAN CATCH   Complement C4   Result Value Ref Range    Complement C4 16 14 - 44 MG/DL       If you have any questions or concerns, please call the clinic at the number listed above.       Sincerely,        Marie Charles MD

## 2020-09-16 ENCOUNTER — TRANSFERRED RECORDS (OUTPATIENT)
Dept: HEALTH INFORMATION MANAGEMENT | Facility: CLINIC | Age: 45
End: 2020-09-16

## 2020-09-21 DIAGNOSIS — J32.9 RECURRENT SINUS INFECTIONS: ICD-10-CM

## 2020-09-21 DIAGNOSIS — J31.0 CHRONIC RHINITIS: ICD-10-CM

## 2020-09-21 NOTE — TELEPHONE ENCOUNTER
Spoke with patient, offered to schedule appointment, declined. Patient to request from PCP  Nita given x1 on 7/9/20, patient did not follow up   LOV:  8/12/2019    Diamante Brewster RN

## 2020-09-21 NOTE — TELEPHONE ENCOUNTER
Requested Prescriptions   Pending Prescriptions Disp Refills     budesonide (RINOCORT AQUA) 32 MCG/ACT nasal spray 8.6 Bottle 0     Sig: Spray 1 spray into both nostrils daily Follow-up needed for further refills.       There is no refill protocol information for this order      Last Written Prescription Date:    Last Fill Quantity: ,  # refills:    Last office visit: 8/12/2019 with prescribing provider:     Future Office Visit:

## 2020-10-28 DIAGNOSIS — M06.4 UNDIFFERENTIATED INFLAMMATORY ARTHRITIS (H): ICD-10-CM

## 2020-11-02 RX ORDER — HYDROXYCHLOROQUINE SULFATE 200 MG/1
200 TABLET, FILM COATED ORAL 2 TIMES DAILY
Qty: 180 TABLET | Refills: 1 | Status: SHIPPED | OUTPATIENT
Start: 2020-11-02 | End: 2021-05-22

## 2020-11-02 NOTE — TELEPHONE ENCOUNTER
Last Clinic Visit: 8/7/2020 [facility] [place]  Eye Exam: 9/18/2020

## 2020-12-20 ENCOUNTER — HEALTH MAINTENANCE LETTER (OUTPATIENT)
Age: 45
End: 2020-12-20

## 2021-02-24 ENCOUNTER — DOCUMENTATION ONLY (OUTPATIENT)
Dept: CARE COORDINATION | Facility: CLINIC | Age: 46
End: 2021-02-24

## 2021-02-26 ENCOUNTER — VIRTUAL VISIT (OUTPATIENT)
Dept: RHEUMATOLOGY | Facility: CLINIC | Age: 46
End: 2021-02-26
Attending: INTERNAL MEDICINE
Payer: COMMERCIAL

## 2021-02-26 DIAGNOSIS — O09.92 HIGH-RISK PREGNANCY IN SECOND TRIMESTER: ICD-10-CM

## 2021-02-26 DIAGNOSIS — M06.4 UNDIFFERENTIATED INFLAMMATORY ARTHRITIS (H): Primary | ICD-10-CM

## 2021-02-26 PROCEDURE — 99214 OFFICE O/P EST MOD 30 MIN: CPT | Mod: 95 | Performed by: INTERNAL MEDICINE

## 2021-02-26 ASSESSMENT — PAIN SCALES - GENERAL: PAINLEVEL: NO PAIN (1)

## 2021-02-26 NOTE — PROGRESS NOTES
Gwendolyn is a 45 year old who is being evaluated via a billable video visit.      How would you like to obtain your AVS? Mail a copy  If the video visit is dropped, the invitation should be resent by: Send to e-mail at: [email or web address]  Will anyone else be joining your video visit? No      Video Start Time: 10:41 am    Video-Visit Details    Type of service:  Video Visit    Video End Time: 10:57 am    Originating Location (pt. 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        Gwendolyn is now 27 wk pregnant. Her TG is 2021. She is doing well. No pregnancy issues, no gestational diabetes, no HTN. Had nausea during 1st trimester which is now resolved. Baby is growing well. Off hormones, used them x 12 weeks. She is on Lovenox and ASA.    Her hands feel stiff and achy in am. No joint swelling. Wagoner 1 hr of AM stiffness x 2-3 months. Joint sx are tolerable. Hips and low back are fine. In 2021, had upper back pain and fibromyalgia flare.    Her aunt  from lupus.       ROS:  A comprehensive ROS was done, positives are per HPI.            Component      Latest Ref Rng 2014          12:00 AM 12:00 AM   Sodium      137 - 145 mmol/L 137    Potassium      3.6 - 5.0 mmol/L 4.4    Chloride      98 - 107 mmol/L 99    CARBON DIOXIDE, TOTAL      22 - 35 mmol/L 26    Anion Gap       12    Glucose      65 - 100 mg/dL 100    Urea Nitrogen      7 - 20 mg/dL 14    Creatinine      0.5 - 1.0 mg/dL 0.7    Calcium      8.4 - 10.2 mg/dL 9.3    Protein Total      6.3 - 8.2 g/dL 7.1    Albumin      3.5 - 5.0 g/dL  4.2    Bilirubin Total      0.2 - 1.3 mg/dL 0.5    Alkaline Phosphatase      38 - 126 U/L 47    AST      14 - 59 U/L 26    ALT      9 - 72 U/L 28    RNP Antibodies      0.0 - 0.9 AI <0.2 <0.2   Kevin Antibodies      0.0 - 0.9 AI <0.2 <0.2   Scleroderma Antibody Scl-70 ANA CRISTINA IgG      0.0 - 0.9 AI  <0.2   Sjogren's Anti-SS-A      0.0 - 0.9 AI  <0.2   Sjogren's Anti-SS-B      0.0 - 0.9 AI  <0.2   Antichromatin Antibodies      0.0 - 0.9 AI  <0.2   Anti-Laura-1      0.0 - 0.9 Al  <0.2   Centomere B Atb      0.0 - 0.9 AI  <0.2   QuantiFERON TB Gold       Neg    QuantiFERON TB Ag Value       0.05    QuantiFERON Nil Value       0.05    QuantiFERON Mitogen Value       >10.00    QFT TB Ag minus Nil Value       0.00    NANCY Screen by EIA       Neg    Hepatitis C PANKAJ      0.0 - 0.9 0.1    Vitamin D,25-Hydroxy      30.0 - 100.0 ng/mL 34.8    HBsAg Screen       Neg    Hepatitis B Core Antibody       Neg    Complement C4      9 - 36 16    Complement C3      90 - 180 153    Rheumatoid Factor      0.0 - 13.9 KIU/L 9.8    C-Reactive Protein      0.0 - 4.9 mg/dL 2.7    Anti-DNA (DS) Ab Qn      0 - 9 IU/mL 9    CCP Antibodies      0 - 19 U/mL 3      Exam: Bilateral wrists, 3 views each. 1/10/2014.    Comparison: None.    Clinical history: Arthropathy.    Findings: 3 views each of the bilateral wrists were obtained. Joint  spaces are well-maintained. No erosive changes are noted. No soft  tissue abnormalities are seen.       Result Impression       Impression: No erosive changes in the bilateral wrists.    AVELINA HIGGINBOTHAM MD  I have personally reviewed the image and initial interpretation, and I  agree with findings.     Exam: Bilateral hands, 3 views each. 1/10/2014.    Comparison: None.    Clinical history: Arthropathy.    Findings: 3 views each of the bilateral hands were obtained. The joint  spaces are well-maintained. No soft tissue abnormalities are noted. No  erosive changes are seen.       Result Impression       Impression: No  erosive changes in the bilateral hands.    AVELINA HIGGINBOTHAM MD  I have personally reviewed the image and initial interpretation, and I  agree with findings.     Exam: Sacroiliac joints, 3 views. 1/10/2014.    Comparison: None.    Clinical history: Arthropathy.    Findings: 3 views of the sacroiliac joints were obtained. The  sacroiliac joints are patent. No abnormal sclerosis is noted. No  definite erosive changes are seen.       Result Impression       Impression: No acute bone abnormality in the sacroiliac joints.    AVELINA HIGGINBOTHAM MD  I have personally reviewed the image and initial interpretation, and I  agree with findings.       HLA B27 Typing       Specimen received - Immunology report to follow upon completion. NEGATIVE     Exam: MRI of the sacroiliac joints dated 10/21/2015.  Comparison: 1/10/2014.  Clinical history: Evaluate for sacroiliitis.  Technique: Multiplanar, multisequence MR imaging of the sacroiliac  joints were obtained using standard sequences in 2 orthogonal planes  before and after the uneventful administration of intravenous  gadolinium contrast.  Findings:  No significant fluid in the sacroiliac joints. No abnormal enhancement  to suggest sacroiliitis. No erosive changes are noted.  The muscle bulk is intact without significant atrophy or edema. The  visualized intrapelvic structures are unremarkable. No  lymphadenopathy. No full-thickness tear or tendon retraction.  No abnormal marrow signal to suggest fracture, osteonecrosis, or  marrow infiltration. Nonspecific subtle focus of T2 hyperintensity  within the left iliac bone, coronal series 3 image 10, likely  vascularity.  Large field-of-view limits evaluation the hip joints. No  full-thickness cartilage loss or joint effusion. The visualized lower  lumbar spine is unremarkable.  IMPRESSION  Impression:  1. No findings to suggest sacroiliitis.  2. No abnormal marrow signal to suggest fracture, osteonecrosis, or  marrow  infiltration.  AVELINA HIGGINBOTHAM MD  Component      Latest Ref Rng & Units 12/16/2016   Cardiolipin Antibody IgG      0.0 - 19.9 GPL-U/mL 3.2   Cardiolipin Antibody IgM      0.0 - 19.9 MPL-U/mL 27.7 (H)   Cardiolipin Antibody IgA      0.0 - 19.9 APL U/mL 5.7   Beta 2 Glycoprotein 1 Antibody IgA      <7 U/mL 1.6     Component      Latest Ref Rng & Units 10/13/2017   WBC      4.0 - 11.0 10e9/L 7.3   RBC Count      3.8 - 5.2 10e12/L 4.13   Hemoglobin      11.7 - 15.7 g/dL 12.6   Hematocrit      35.0 - 47.0 % 37.5   MCV      78 - 100 fl 91   MCH      26.5 - 33.0 pg 30.5   MCHC      31.5 - 36.5 g/dL 33.6   RDW      10.0 - 15.0 % 11.8   Platelet Count      150 - 450 10e9/L 283   Diff Method       Automated Method   % Neutrophils      % 63.1   % Lymphocytes      % 25.2   % Monocytes      % 9.0   % Eosinophils      % 1.6   % Basophils      % 0.8   % Immature Granulocytes      % 0.3   Nucleated RBCs      0 /100 0   Absolute Neutrophil      1.6 - 8.3 10e9/L 4.6   Absolute Lymphocytes      0.8 - 5.3 10e9/L 1.8   Absolute Monocytes      0.0 - 1.3 10e9/L 0.7   Absolute Eosinophils      0.0 - 0.7 10e9/L 0.1   Absolute Basophils      0.0 - 0.2 10e9/L 0.1   Abs Immature Granulocytes      0 - 0.4 10e9/L 0.0   Absolute Nucleated RBC       0.0   Color Urine       Yellow   Appearance Urine       Slightly Cloudy   Glucose Urine      NEG:Negative mg/dL Negative   Bilirubin Urine      NEG:Negative Negative   Ketones Urine      NEG:Negative mg/dL Negative   Specific Gravity Urine      1.003 - 1.035 1.015   Blood Urine      NEG:Negative Negative   pH Urine      5.0 - 7.0 pH 5.0   Protein Albumin Urine      NEG:Negative mg/dL Negative   Urobilinogen mg/dL      0.0 - 2.0 mg/dL 0.0   Nitrite Urine      NEG:Negative Negative   Leukocyte Esterase Urine      NEG:Negative Small (A)   Source       Midstream Urine   WBC Urine      0 - 2 /HPF 2   RBC Urine      0 - 2 /HPF 1   Bacteria Urine      NEG:Negative /HPF Few (A)   Squamous Epithelial /HPF  Urine      0 - 1 /HPF 4 (H)   Mucous Urine      NEG:Negative /LPF Present (A)   Creatinine      0.52 - 1.04 mg/dL 0.91   GFR Estimate      >60 mL/min/1.7m2 67   GFR Estimate If Black      >60 mL/min/1.7m2 82   Cardiolipin Antibody IgG      0.0 - 19.9 GPL-U/mL 3.5   Cardiolipin Antibody IgM      0.0 - 19.9 MPL-U/mL 27.0 (H)   Protein Random Urine      g/L 0.18   Protein Total Urine g/gr Creatinine      0 - 0.2 g/g Cr 0.15   NANCY interpretation      NEG:Negative Negative   NANCY titer 1       1:40   Lupus Result      NEG:Negative Negative   Beta 2 Glycoprotein 1 Antibody IgM      <7 U/mL 8.9 (H)   Beta 2 Glycoprotein 1 Antibody IgG      <7 U/mL <0.6   AST      0 - 45 U/L 19   ALT      0 - 50 U/L 35   Albumin      3.4 - 5.0 g/dL 4.0   Complement C4      15 - 50 mg/dL 19   Complement C3      76 - 169 mg/dL 131   CRP Inflammation      0.0 - 8.0 mg/L <2.9   Sed Rate      0 - 20 mm/h 18   DNA-ds      <10 IU/mL 4   Creatinine Urine      mg/dL 124     Component      Latest Ref Rng & Units 10/11/2019   Vitamin D Deficiency screening      20 - 75 ug/L 85 (H)   SSA (Ro) (ANA CRISTINA) Antibody, IgG      0.0 - 0.9 AI <0.2   SSB (La) (ANA CRISTINA) Antibody, IgG      0.0 - 0.9 AI <0.2   NANCY interpretation      NEG:Negative Negative   DNA-ds      <10 IU/mL 4   Complement C3      76 - 169 mg/dL 119   Complement C4      15 - 50 mg/dL 15     Component      Latest Ref Rng & Units 8/10/2020   Color Urine       Yellow   Appearance      Clear Clear   Glucose Urine      neg mg/dL Neg   Bilirubin Urine      neg Neg   Ketones Urine      neg mg/dL Other   Specific Gravity Urine      1.003 - 1.035 1.030   Blood Urine      neg Neg   pH Urine      5.0 - 7.0 pH 7.5   Protein Urine      Negative mg/dL Trace   Urobilinogen Urine      0.2 - 1.0 EU/dL 0.2   Nitrite Urine      NEG Neg   Leukocytes      Negative Negative   WBC Urine      0 - 5 /HPF 3-5   RBC Urine      0 - 5 /HPF 0-2   Epithelial Cells UR      None Seen /LPF Moderate   Bacteria Urine      Negative /HPF  Trace   Mucus Urine (External)      Negative /LPF Moderate   WBC      5.0 - 10.0 K/uL 5.5   RBC Count      3.70 - 6.30 M/uL 4.01   Hemoglobin      12.0 - 16.0 g/dL 12.2   Hematocrit      37.0 - 47.0 % 34.7   MCV      80 - 98 fl 87   MCH      27.0 - 31.0 pg 30.4   MCHC      32.0 - 40.0 g/dL 35.1   RDW      11.5 - 14.5 % 11.3   Platelet Count      145 - 375 k/uL 288   % Lymphocytes      0.9 - 44.0 % 23.7   MPV      8.0 - 11.0 fL 9.0   GRANULOCYTES #      1.4 - 9.0 K/uL 3.9   % Granulocytes      43.0 - 76.0 % 71.3   Lymphocytes #      0.9 - 5.4 K/uL 1.3   Mid #      0.0 - 1.8 K/uL 0.3   Mid %      0.0 - 18.0 % 5.0   Creatinine      0.5 - 1.0 mg/dL 0.8   GFR Estimate      CALC 77.341   GFR Estimate If Black      CALC 93.583   Creatine Urine      NOT ESTAB. MG/.3   Protein      NOT ESTAB. MG/DL 8.6   PROTEIN CREAT RATIO      0 - 200 mg/g creat 57   ALT      9 - 72 U/L 19   AST      14 - 59 U/L 30   Albumin      3.5 - 5.0 g/dL 3.9   Vitamin D Total      30.00 - 100.00 ng/mL 70.48   Sed Rate      0 - 20 mm/hr 3   Complement C3      82 - 167 MG/   C-Reactive Protein      0 - 10 MG/L 2   Anti-DNA (DS) Ab Qn      0 - 9 IU/mL 6   CULTURE URINE - NOTE      Text NO GROWTH   Complement C4      14 - 44 MG/DL 16     HISTORY REVIEW:  Past Medical History:   Diagnosis Date     Anxiety 2013     Asthma      Chronic sinusitis 2018     Hypertension      Inflammatory arthritis      Past Surgical History:   Procedure Laterality Date     CHOLECYSTECTOMY       GALLBLADDER SURGERY       GYN SURGERY  2018    both my Fallopian tube, and right ovary, and appendix     HYSTERECTOMY Right     Partial- right ovary removed     TONSILLECTOMY       TONSILLECTOMY  1993     Family History   Problem Relation Age of Onset     Lupus Paternal Aunt         father had double lung transplant for alpha 1 anti-trypsin def     Arthritis Maternal Grandmother         RA     Hypertension Maternal Grandmother      Depression Maternal Grandmother       Arthritis Paternal Grandfather         RA     Family History Negative Other         neg for AS, psoriasis     Gastrointestinal Disease Other         cousin has colitis     Diabetes Father         due to transplant     Allergies Father      Diabetes Other         Aunts on both sides     Hypertension Mother      Allergies Mother      Hypertension Maternal Grandfather      Arthritis Maternal Grandfather      Arthritis Paternal Grandmother      Other - See Comments Other         fibromyalgia - paternal aunt     Diabetes Other      Social History     Socioeconomic History     Marital status:      Spouse name: Not on file     Number of children: 0     Years of education: Not on file     Highest education level: Not on file   Occupational History     Occupation:      Employer: [employer]   Social Needs     Financial resource strain: Not on file     Food insecurity     Worry: Not on file     Inability: Not on file     Transportation needs     Medical: Not on file     Non-medical: Not on file   Tobacco Use     Smoking status: Never Smoker     Smokeless tobacco: Never Used   Substance and Sexual Activity     Alcohol use: Yes     Comment: occ     Drug use: No     Sexual activity: Yes     Partners: Male     Birth control/protection: None   Lifestyle     Physical activity     Days per week: Not on file     Minutes per session: Not on file     Stress: Not on file   Relationships     Social connections     Talks on phone: Not on file     Gets together: Not on file     Attends Alevism service: Not on file     Active member of club or organization: Not on file     Attends meetings of clubs or organizations: Not on file     Relationship status: Not on file     Intimate partner violence     Fear of current or ex partner: Not on file     Emotionally abused: Not on file     Physically abused: Not on file     Forced sexual activity: Not on file   Other Topics Concern     Not on file   Social History Narrative    August  2019        ENVIRONMENTAL HISTORY: [place] home is heated with a gas furnace and infloor heating. They do have central air conditioning. The patient's bedroom is furnished with carpeting in bedroom and fabric window coverings.  No pets inside the house. There is no history of cockroach or mice infestation. There are no smokers in the house.  The house does not have a basement.        PMHx, FHx, SHx were reviewed, unchanged.    Pregnancy Hx: She is  s/p one miscarriage around 12 wk    Outpatient Encounter Medications as of 2021   Medication Sig Dispense Refill     albuterol (VENTOLIN HFA) 108 (90 BASE) MCG/ACT inhaler Inhale 2 Puffs into the lungs four times a day as needed for Wheezing or Shortness of Breath (as needed). Shake before using.       budesonide (RINOCORT AQUA) 32 MCG/ACT nasal spray Spray 1 spray into both nostrils daily Follow-up needed for further refills. 8.6 Bottle 0     escitalopram (LEXAPRO) 10 MG tablet Take 10 mg by mouth daily  0     hydroxychloroquine (PLAQUENIL) 200 MG tablet Take 1 tablet (200 mg) by mouth 2 times daily 180 tablet 1     labetalol (NORMODYNE) 200 MG tablet Take 100 mg by mouth 2 times daily       montelukast (SINGULAIR) 10 MG tablet Take 1 tablet by mouth daily  3     Prenatal Multivit-Min-Fe-FA (PRENATAL VITAMINS PO) With iron       tiZANidine (ZANAFLEX) 4 MG tablet Take 4 mg by mouth 3 times daily       traMADol (ULTRAM) 50 MG tablet Take 50 mg by mouth       VITAMIN D, CHOLECALCIFEROL, PO Take 2,000 Units by mouth daily       leuprolide acetate (LUPRON) 1 MG/0.2ML kit INJECT 20 UNITS SC D AND DECREASE UTD       metFORMIN (GLUCOPHAGE) 500 MG tablet        norethindrone-ethinyl estradiol-iron (ESTROSTEP FE) 1-20/1-30/1-35 MG-MCG per tablet Take 1 tablet by mouth daily       No facility-administered encounter medications on file as of 2021.      Allergies   Allergen Reactions     Tetanus Immune Globulin       Fever     Tetanus Toxoid            Ph.E:    GA: NAD, pleasant  Eyes: nl sclera, conj, EOMI  MSK: no active synovitis  Skin: no rash  Neuro: non focal  Psych: nl affect          Assessment/ plan:    #seronegative inflammatory arthritis  - H/o seronegative IA Dx 1/2006 with flares of IA in hands, low back pain s/p Tx with prednisone, SSZ and lodine. Received MRI report of L index finger 4/06  which showed L index finger edema from PIP to DIP (non specific finding). Her work up at initial visit in 1/2014 was suggestive of seroneg non-erosive IA. She was recommended to increase lodine to 2 tab a day but could not tolerate it sec GI upset. Has FM TP but FMS can't explain all her pain including pain/tenderness/swelling over PIP joints, AM stiffness> 1hr and good response to steroid/SSZ in the past. For AI, recommended a trial of HCQ. She is on HCQ since 3/2014 with excellent response. Her IA is under fair control on HCQ.     -neg HLA-B27 and neg pelvic MRI 10/2015 for sacroiliitis, SI joint pain resolved.      - continue plaquenil 200mg PO BID, was informed that's safe in pregnancy    -Recommend eye exam for HCQ monitoring.     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 She was put on ASA+lovenox during IVF. She failed IVF fresh embryo (two) and leftover frozen embryo (one) transfer and was told given her age her best option would be to use donor embryo. She is recommended to use ASA 81 mg qd+lovenox during future pregnancy with donor embryo and I agree given her h/o miscarriage at 12 wk. She has no h/o thrombosis. She still does not meet criteria for APS syndrome as miscarriage was still considered 1st trimester miscarriage.    Had embryo transfer on 10/29/2019, unfortunately miscarried at 3 months despite  being on ASA+Lovenox. Reportedly, baby stopped growing.     Had another embryo transfer in 9/2020,  This was her last donor embryo. Gwendolyn is now 27 wk pregnant. Her pregnancy is going well, joint sx are tolerable. Her GT is 5/21/2021.    She always had neg NANCY here and no features of SLE.    I reviewed her recent outside labs, which were all stable. Recommend to continue  mg bid which is safe in pregnancy and breastfeeding with yearly eye exam.      Continue ASA+lovenox per OB/GYN.    I ordered labs, which will be done at local lab.    Follow up in 3-4 months      Orders Placed This Encounter   Procedures     ALT     Albumin level     AST     CBC with platelets differential     Creatinine     Complement C4     Complement C3     CRP inflammation     DNA double stranded antibodies     Erythrocyte sedimentation rate auto     UA with Microscopic reflex to Culture     Protein  random urine with Creat Ratio     Creatinine random urine     Complement Activity Total (CH50)     Uric acid         Marie Charles MD

## 2021-02-26 NOTE — LETTER
2021       RE: Gwendolyn Melgar  [id number] [address] [address] [facility] [id number]     Dear Colleague,    Thank you for referring your patient, Gwendolyn Melgar, to the [facility] RHEUMATOLOGY [facility]INIC [place] at [facility]. Please see a copy of my visit note below.    Gwendolyn is a 45 year old who is being evaluated via a billable video visit.      How would you like to obtain your AVS? Mail a copy  If the video visit is dropped, the invitation should be resent by: Send to e-mail at: [email or web address]  Will anyone else be joining your video visit? No      Video Start Time: 10:41 am    Video-Visit Details    Type of service:  Video Visit    Video End Time: 10:57 am    Originating Location (pt. 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        Gwendolyn is now 27 wk pregnant. Her TG is 2021. She is doing well. No pregnancy issues, no gestational diabetes, no HTN. Had nausea during 1st trimester which is now resolved. Baby is growing well. Off hormones, used them x 12 weeks. She is on Lovenox and ASA.    Her hands feel stiff and achy in am. No joint swelling. Wagoner 1 hr of AM stiffness x 2-3 months. Joint sx are tolerable. Hips and low back are fine. In 2021, had upper back pain and fibromyalgia flare.    Her aunt  from lupus.       ROS:  A comprehensive ROS was done, positives are per HPI.            Component      Latest Ref Rng 2014          12:00 AM 12:00 AM   Sodium      137 - 145 mmol/L 137    Potassium      3.6 - 5.0 mmol/L 4.4    Chloride       98 - 107 mmol/L 99    CARBON DIOXIDE, TOTAL      22 - 35 mmol/L 26    Anion Gap       12    Glucose      65 - 100 mg/dL 100    Urea Nitrogen      7 - 20 mg/dL 14    Creatinine      0.5 - 1.0 mg/dL 0.7    Calcium      8.4 - 10.2 mg/dL 9.3    Protein Total      6.3 - 8.2 g/dL 7.1    Albumin      3.5 - 5.0 g/dL 4.2    Bilirubin Total      0.2 - 1.3 mg/dL 0.5    Alkaline Phosphatase      38 - 126 U/L 47    AST      14 - 59 U/L 26    ALT      9 - 72 U/L 28    RNP Antibodies      0.0 - 0.9 AI <0.2 <0.2   Kevin Antibodies      0.0 - 0.9 AI <0.2 <0.2   Scleroderma Antibody Scl-70 ANA CRISTINA IgG      0.0 - 0.9 AI  <0.2   Sjogren's Anti-SS-A      0.0 - 0.9 AI  <0.2   Sjogren's Anti-SS-B      0.0 - 0.9 AI  <0.2   Antichromatin Antibodies      0.0 - 0.9 AI  <0.2   Anti-Laura-1      0.0 - 0.9 Al  <0.2   Centomere B Atb      0.0 - 0.9 AI  <0.2   QuantiFERON TB Gold       Neg    QuantiFERON TB Ag Value       0.05    QuantiFERON Nil Value       0.05    QuantiFERON Mitogen Value       >10.00    QFT TB Ag minus Nil Value       0.00    NANCY Screen by EIA       Neg    Hepatitis C PANKAJ      0.0 - 0.9 0.1    Vitamin D,25-Hydroxy      30.0 - 100.0 ng/mL 34.8    HBsAg Screen       Neg    Hepatitis B Core Antibody       Neg    Complement C4      9 - 36 16    Complement C3      90 - 180 153    Rheumatoid Factor      0.0 - 13.9 KIU/L 9.8    C-Reactive Protein      0.0 - 4.9 mg/dL 2.7    Anti-DNA (DS) Ab Qn      0 - 9 IU/mL 9    CCP Antibodies      0 - 19 U/mL 3      Exam: Bilateral wrists, 3 views each. 1/10/2014.    Comparison: None.    Clinical history: Arthropathy.    Findings: 3 views each of the bilateral wrists were obtained. Joint  spaces are well-maintained. No erosive changes are noted. No soft  tissue abnormalities are seen.       Result Impression       Impression: No erosive changes in the bilateral wrists.    AVELINA HIGGINBOTHAM MD  I have personally reviewed the image and initial interpretation, and I  agree with findings.     Exam: Bilateral  hands, 3 views each. 1/10/2014.    Comparison: None.    Clinical history: Arthropathy.    Findings: 3 views each of the bilateral hands were obtained. The joint  spaces are well-maintained. No soft tissue abnormalities are noted. No  erosive changes are seen.       Result Impression       Impression: No erosive changes in the bilateral hands.    AVELINA HIGGINBOTHAM MD  I have personally reviewed the image and initial interpretation, and I  agree with findings.     Exam: Sacroiliac joints, 3 views. 1/10/2014.    Comparison: None.    Clinical history: Arthropathy.    Findings: 3 views of the sacroiliac joints were obtained. The  sacroiliac joints are patent. No abnormal sclerosis is noted. No  definite erosive changes are seen.       Result Impression       Impression: No acute bone abnormality in the sacroiliac joints.    AVELINA HIGGINBOTHAM MD  I have personally reviewed the image and initial interpretation, and I  agree with findings.       HLA B27 Typing       Specimen received - Immunology report to follow upon completion. NEGATIVE     Exam: MRI of the sacroiliac joints dated 10/21/2015.  Comparison: 1/10/2014.  Clinical history: Evaluate for sacroiliitis.  Technique: Multiplanar, multisequence MR imaging of the sacroiliac  joints were obtained using standard sequences in 2 orthogonal planes  before and after the uneventful administration of intravenous  gadolinium contrast.  Findings:  No significant fluid in the sacroiliac joints. No abnormal enhancement  to suggest sacroiliitis. No erosive changes are noted.  The muscle bulk is intact without significant atrophy or edema. The  visualized intrapelvic structures are unremarkable. No  lymphadenopathy. No full-thickness tear or tendon retraction.  No abnormal marrow signal to suggest fracture, osteonecrosis, or  marrow infiltration. Nonspecific subtle focus of T2 hyperintensity  within the left iliac bone, coronal series 3 image 10, likely  vascularity.  Large  field-of-view limits evaluation the hip joints. No  full-thickness cartilage loss or joint effusion. The visualized lower  lumbar spine is unremarkable.  IMPRESSION  Impression:  1. No findings to suggest sacroiliitis.  2. No abnormal marrow signal to suggest fracture, osteonecrosis, or  marrow infiltration.  AVELINA HIGGINBOTHAM MD  Component      Latest Ref Rng & Units 12/16/2016   Cardiolipin Antibody IgG      0.0 - 19.9 GPL-U/mL 3.2   Cardiolipin Antibody IgM      0.0 - 19.9 MPL-U/mL 27.7 (H)   Cardiolipin Antibody IgA      0.0 - 19.9 APL U/mL 5.7   Beta 2 Glycoprotein 1 Antibody IgA      <7 U/mL 1.6     Component      Latest Ref Rng & Units 10/13/2017   WBC      4.0 - 11.0 10e9/L 7.3   RBC Count      3.8 - 5.2 10e12/L 4.13   Hemoglobin      11.7 - 15.7 g/dL 12.6   Hematocrit      35.0 - 47.0 % 37.5   MCV      78 - 100 fl 91   MCH      26.5 - 33.0 pg 30.5   MCHC      31.5 - 36.5 g/dL 33.6   RDW      10.0 - 15.0 % 11.8   Platelet Count      150 - 450 10e9/L 283   Diff Method       Automated Method   % Neutrophils      % 63.1   % Lymphocytes      % 25.2   % Monocytes      % 9.0   % Eosinophils      % 1.6   % Basophils      % 0.8   % Immature Granulocytes      % 0.3   Nucleated RBCs      0 /100 0   Absolute Neutrophil      1.6 - 8.3 10e9/L 4.6   Absolute Lymphocytes      0.8 - 5.3 10e9/L 1.8   Absolute Monocytes      0.0 - 1.3 10e9/L 0.7   Absolute Eosinophils      0.0 - 0.7 10e9/L 0.1   Absolute Basophils      0.0 - 0.2 10e9/L 0.1   Abs Immature Granulocytes      0 - 0.4 10e9/L 0.0   Absolute Nucleated RBC       0.0   Color Urine       Yellow   Appearance Urine       Slightly Cloudy   Glucose Urine      NEG:Negative mg/dL Negative   Bilirubin Urine      NEG:Negative Negative   Ketones Urine      NEG:Negative mg/dL Negative   Specific Gravity Urine      1.003 - 1.035 1.015   Blood Urine      NEG:Negative Negative   pH Urine      5.0 - 7.0 pH 5.0   Protein Albumin Urine      NEG:Negative mg/dL Negative   Urobilinogen  mg/dL      0.0 - 2.0 mg/dL 0.0   Nitrite Urine      NEG:Negative Negative   Leukocyte Esterase Urine      NEG:Negative Small (A)   Source       Midstream Urine   WBC Urine      0 - 2 /HPF 2   RBC Urine      0 - 2 /HPF 1   Bacteria Urine      NEG:Negative /HPF Few (A)   Squamous Epithelial /HPF Urine      0 - 1 /HPF 4 (H)   Mucous Urine      NEG:Negative /LPF Present (A)   Creatinine      0.52 - 1.04 mg/dL 0.91   GFR Estimate      >60 mL/min/1.7m2 67   GFR Estimate If Black      >60 mL/min/1.7m2 82   Cardiolipin Antibody IgG      0.0 - 19.9 GPL-U/mL 3.5   Cardiolipin Antibody IgM      0.0 - 19.9 MPL-U/mL 27.0 (H)   Protein Random Urine      g/L 0.18   Protein Total Urine g/gr Creatinine      0 - 0.2 g/g Cr 0.15   NANCY interpretation      NEG:Negative Negative   NANCY titer 1       1:40   Lupus Result      NEG:Negative Negative   Beta 2 Glycoprotein 1 Antibody IgM      <7 U/mL 8.9 (H)   Beta 2 Glycoprotein 1 Antibody IgG      <7 U/mL <0.6   AST      0 - 45 U/L 19   ALT      0 - 50 U/L 35   Albumin      3.4 - 5.0 g/dL 4.0   Complement C4      15 - 50 mg/dL 19   Complement C3      76 - 169 mg/dL 131   CRP Inflammation      0.0 - 8.0 mg/L <2.9   Sed Rate      0 - 20 mm/h 18   DNA-ds      <10 IU/mL 4   Creatinine Urine      mg/dL 124     Component      Latest Ref Rng & Units 10/11/2019   Vitamin D Deficiency screening      20 - 75 ug/L 85 (H)   SSA (Ro) (ANA CRISTINA) Antibody, IgG      0.0 - 0.9 AI <0.2   SSB (La) (ANA CRISTINA) Antibody, IgG      0.0 - 0.9 AI <0.2   NANCY interpretation      NEG:Negative Negative   DNA-ds      <10 IU/mL 4   Complement C3      76 - 169 mg/dL 119   Complement C4      15 - 50 mg/dL 15     Component      Latest Ref Rng & Units 8/10/2020   Color Urine       Yellow   Appearance      Clear Clear   Glucose Urine      neg mg/dL Neg   Bilirubin Urine      neg Neg   Ketones Urine      neg mg/dL Other   Specific Gravity Urine      1.003 - 1.035 1.030   Blood Urine      neg Neg   pH Urine      5.0 - 7.0 pH 7.5   Protein  Urine      Negative mg/dL Trace   Urobilinogen Urine      0.2 - 1.0 EU/dL 0.2   Nitrite Urine      NEG Neg   Leukocytes      Negative Negative   WBC Urine      0 - 5 /HPF 3-5   RBC Urine      0 - 5 /HPF 0-2   Epithelial Cells UR      None Seen /LPF Moderate   Bacteria Urine      Negative /HPF Trace   Mucus Urine (External)      Negative /LPF Moderate   WBC      5.0 - 10.0 K/uL 5.5   RBC Count      3.70 - 6.30 M/uL 4.01   Hemoglobin      12.0 - 16.0 g/dL 12.2   Hematocrit      37.0 - 47.0 % 34.7   MCV      80 - 98 fl 87   MCH      27.0 - 31.0 pg 30.4   MCHC      32.0 - 40.0 g/dL 35.1   RDW      11.5 - 14.5 % 11.3   Platelet Count      145 - 375 k/uL 288   % Lymphocytes      0.9 - 44.0 % 23.7   MPV      8.0 - 11.0 fL 9.0   GRANULOCYTES #      1.4 - 9.0 K/uL 3.9   % Granulocytes      43.0 - 76.0 % 71.3   Lymphocytes #      0.9 - 5.4 K/uL 1.3   Mid #      0.0 - 1.8 K/uL 0.3   Mid %      0.0 - 18.0 % 5.0   Creatinine      0.5 - 1.0 mg/dL 0.8   GFR Estimate      CALC 77.341   GFR Estimate If Black      CALC 93.583   Creatine Urine      NOT ESTAB. MG/.3   Protein      NOT ESTAB. MG/DL 8.6   PROTEIN CREAT RATIO      0 - 200 mg/g creat 57   ALT      9 - 72 U/L 19   AST      14 - 59 U/L 30   Albumin      3.5 - 5.0 g/dL 3.9   Vitamin D Total      30.00 - 100.00 ng/mL 70.48   Sed Rate      0 - 20 mm/hr 3   Complement C3      82 - 167 MG/   C-Reactive Protein      0 - 10 MG/L 2   Anti-DNA (DS) Ab Qn      0 - 9 IU/mL 6   CULTURE URINE - NOTE      Text NO GROWTH   Complement C4      14 - 44 MG/DL 16     HISTORY REVIEW:  Past Medical History:   Diagnosis Date     Anxiety 2013     Asthma      Chronic sinusitis 2018     Hypertension      Inflammatory arthritis      Past Surgical History:   Procedure Laterality Date     CHOLECYSTECTOMY       GALLBLADDER SURGERY       GYN SURGERY  2018    both my Fallopian tube, and right ovary, and appendix     HYSTERECTOMY Right     Partial- right ovary removed     TONSILLECTOMY        TONSILLECTOMY  1993     Family History   Problem Relation Age of Onset     Lupus Paternal Aunt         father had double lung transplant for alpha 1 anti-trypsin def     Arthritis Maternal Grandmother         RA     Hypertension Maternal Grandmother      Depression Maternal Grandmother      Arthritis Paternal Grandfather         RA     Family History Negative Other         neg for AS, psoriasis     Gastrointestinal Disease Other         cousin has colitis     Diabetes Father         due to transplant     Allergies Father      Diabetes Other         Aunts on both sides     Hypertension Mother      Allergies Mother      Hypertension Maternal Grandfather      Arthritis Maternal Grandfather      Arthritis Paternal Grandmother      Other - See Comments Other         fibromyalgia - paternal aunt     Diabetes Other      Social History     Socioeconomic History     Marital status:      Spouse name: Not on file     Number of children: 0     Years of education: Not on file     Highest education level: Not on file   Occupational History     Occupation:      Employer: DNR   Social Needs     Financial resource strain: Not on file     Food insecurity     Worry: Not on file     Inability: Not on file     Transportation needs     Medical: Not on file     Non-medical: Not on file   Tobacco Use     Smoking status: Never Smoker     Smokeless tobacco: Never Used   Substance and Sexual Activity     Alcohol use: Yes     Comment: occ     Drug use: No     Sexual activity: Yes     Partners: Male     Birth control/protection: None   Lifestyle     Physical activity     Days per week: Not on file     Minutes per session: Not on file     Stress: Not on file   Relationships     Social connections     Talks on phone: Not on file     Gets together: Not on file     Attends [employer] service: Not on file     Active member of club or organization: Not on file     Attends meetings of clubs or organizations: Not on file      Relationship status: Not on file     Intimate partner violence     Fear of current or ex partner: Not on file     Emotionally abused: Not on file     Physically abused: Not on file     Forced sexual activity: Not on file   Other Topics Concern     Not on file   Social History Narrative    2019        ENVIRONMENTAL HISTORY: [place] is heated with a gas furnace and infloor heating. They do have central air conditioning. The patient's bedroom is furnished with carpeting in bedroom and fabric window coverings.  No pets inside the house. There is no history of cockroach or mice infestation. There are no smokers in the house.  The house does not have a basement.        PMHx, FHx, SHx were reviewed, unchanged.    Pregnancy Hx: She is  s/p one miscarriage around 12 wk    Outpatient Encounter Medications as of 2021   Medication Sig Dispense Refill     albuterol (VENTOLIN HFA) 108 (90 BASE) MCG/ACT inhaler Inhale 2 Puffs into the lungs four times a day as needed for Wheezing or Shortness of Breath (as needed). Shake before using.       budesonide (RINOCORT AQUA) 32 MCG/ACT nasal spray Spray 1 spray into both nostrils daily Follow-up needed for further refills. 8.6 Bottle 0     escitalopram (LEXAPRO) 10 MG tablet Take 10 mg by mouth daily  0     hydroxychloroquine (PLAQUENIL) 200 MG tablet Take 1 tablet (200 mg) by mouth 2 times daily 180 tablet 1     labetalol (NORMODYNE) 200 MG tablet Take 100 mg by mouth 2 times daily       montelukast (SINGULAIR) 10 MG tablet Take 1 tablet by mouth daily  3     Prenatal Multivit-Min-Fe-FA (PRENATAL VITAMINS PO) With iron       tiZANidine (ZANAFLEX) 4 MG tablet Take 4 mg by mouth 3 times daily       traMADol (ULTRAM) 50 MG tablet Take 50 mg by mouth       VITAMIN D, CHOLECALCIFEROL, PO Take 2,000 Units by mouth daily       leuprolide acetate (LUPRON) 1 MG/0.2ML kit INJECT 20 UNITS SC D AND DECREASE UTD       metFORMIN  (GLUCOPHAGE) 500 MG tablet        norethindrone-ethinyl estradiol-iron (ESTROSTEP FE) 1-20/1-30/1-35 MG-MCG per tablet Take 1 tablet by mouth daily       No facility-administered encounter medications on file as of 2/26/2021.      Allergies   Allergen Reactions     Tetanus Immune Globulin      Fever     Tetanus Toxoid            Ph.E:    GA: NAD, pleasant  Eyes: nl sclera, conj, EOMI  MSK: no active synovitis  Skin: no rash  Neuro: non focal  Psych: nl affect          Assessment/ plan:    #seronegative inflammatory arthritis  - H/o seronegative IA Dx 1/2006 with flares of IA in hands, low back pain s/p Tx with prednisone, SSZ and lodine. Received MRI report of L index finger 4/06  which showed L index finger edema from PIP to DIP (non specific finding). Her work up at initial visit in 1/2014 was suggestive of seroneg non-erosive IA. She was recommended to increase lodine to 2 tab a day but could not tolerate it sec GI upset. Has FM TP but FMS can't explain all her pain including pain/tenderness/swelling over PIP joints, AM stiffness> 1hr and good response to steroid/SSZ in the past. For AI, recommended a trial of HCQ. She is on HCQ since 3/2014 with excellent response. Her IA is under fair control on HCQ.     -neg HLA-B27 and neg pelvic MRI 10/2015 for sacroiliitis, SI joint pain resolved.      - continue plaquenil 200mg PO BID, was informed that's safe in pregnancy    -Recommend eye exam for HCQ monitoring.     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 She was put on ASA+lovenox during IVF. She failed IVF fresh embryo (two) and leftover frozen embryo (one) transfer and was told given her age her best option would be to use donor embryo. She is recommended to use ASA 81 mg qd+lovenox during future  pregnancy with donor embryo and I agree given her h/o miscarriage at 12 wk. She has no h/o thrombosis. She still does not meet criteria for APS syndrome as miscarriage was still considered 1st trimester miscarriage.    Had embryo transfer on 10/29/2019, unfortunately miscarried at 3 months despite being on ASA+Lovenox. Reportedly, baby stopped growing.     Had another embryo transfer in 9/2020,  This was her last donor embryo. Gwendolyn is now 27 wk pregnant. Her pregnancy is going well, joint sx are tolerable. Her TG is 5/21/2021.    She always had neg NANCY here and no features of SLE.    I reviewed her recent outside labs, which were all stable. Recommend to continue  mg bid which is safe in pregnancy and breastfeeding with yearly eye exam.      Continue ASA+lovenox per OB/GYN.    I ordered labs, which will be done at local lab.    Follow up in 3-4 months      Orders Placed This Encounter   Procedures     ALT     Albumin level     AST     CBC with platelets differential     Creatinine     Complement C4     Complement C3     CRP inflammation     DNA double stranded antibodies     Erythrocyte sedimentation rate auto     UA with Microscopic reflex to Culture     Protein  random urine with Creat Ratio     Creatinine random urine     Complement Activity Total (CH50)     Uric acid         Marie Charles MD      Labs faxed to [facility].    Yolanda Posey RN  Rheumatology Clinic

## 2021-04-24 ENCOUNTER — HEALTH MAINTENANCE LETTER (OUTPATIENT)
Age: 46
End: 2021-04-24

## 2021-05-21 DIAGNOSIS — M06.4 UNDIFFERENTIATED INFLAMMATORY ARTHRITIS (H): ICD-10-CM

## 2021-05-21 NOTE — LETTER
Gwendolyn Melgar  [address] [facility] [address]    Dear Gwendolyn Melgar,     Regular eye exams are required while taking hydroxychloroquine (Plaquenil). Eye exams should be completed by an eye specialist who is experienced in monitoring for hydroxychloroquine toxicity (a rare effect of the drug that can damage your eyes and vision).  These may be yearly or as determined by your eye specialist.     Although vision problems and loss of sight while taking hydroxychloroquine are very rare, notify your doctor immediately if you notice changes in your vision. The goal of screening is to detect toxicity before your vision is significantly or noticeably impacted. Failing to get proper screening exams puts you at risk of vision changes which may or may not be reversible.    Per the American Academy of Ophthalmology recommendations (2016), screening tests performed may include a 10-2 visual field test, spectral-domain optical coherence tomography (SD OCT), or other screening tests as determined by the eye specialist, including a multifocal electroretinogram (mfERG) or fundus auto-fluorescence (FAF).    We received a refill request from your pharmacy. A copy of your current eye exam was not found in your medical record. Your hydroxychloroquine prescription has been refilled with a limited supply pending confirmation you have had an eye exam to test for hydroxychloroquine toxicity.      We encourage you to bring this letter to your eye exam to discuss the exams that will be performed during your visit. Please request your eye clinic fax or mail a copy of your eye exam report to our clinic indicating that testing was completed for hydroxychloroquine toxicity screening. The exam notes must specifically comment on the potential for hydroxychloroquine toxicity and outline recommended follow-up.    If you have questions about hydroxychloroquine or the information in this letter, please call the clinic at [phone] or talk to your  provider at your next office visit.                        2    Eye Specialist Letter  Dear Eye Specialist,  To ensure safe use of Plaquenil (hydroxychloroquine), we are requesting your assistance in providing the following information. Please return this form to our clinic via mail or fax, or incorporate this information into your visit summary. Your note must indicate whether or not there is evidence of toxicity from Plaquenil use. For questions regarding this form, please call [phone].  Patient Name:   :             Date of Exam:    The following exams were completed during this visit in accordance with the American Academy of Ophthalmology recommendations (2016):  ? 10-2 automated visual field  ? Spectral-domain optical coherence tomography (SD OCT)  ? Multifocal electroretinogram (mfERG)  ? Fundus autofluorescence (FAF)  ? Other (please specify)  Please select from the following:  ? The findings from the above exams are not suggestive of toxicity from Plaquenil (hydroxychloroquine).  ? The findings from the above exams may suggest toxicity from Plaquenil (hydroxychloroquine).  Directly contact the clinic and fax this form.  Please provide additional guidance on whether or not the medication may be continued from your perspective:  Date of next recommended Plaquenil (hydroxychloroquine) screening eye exam:   ? 5 years  ? 1 year  ? 6 months  Other (please specify):   Eye Specialist Name (print):                                                                Date:  Eye Specialist Signature:

## 2021-05-22 RX ORDER — HYDROXYCHLOROQUINE SULFATE 200 MG/1
200 TABLET, FILM COATED ORAL 2 TIMES DAILY
Qty: 180 TABLET | Refills: 0 | Status: SHIPPED | OUTPATIENT
Start: 2021-05-22 | End: 2021-07-02

## 2021-05-22 NOTE — TELEPHONE ENCOUNTER
hydroxychloroquine (PLAQUENIL) 200 MG tablet   Last Written Prescription Date:  11/2/20  Last Fill Quantity: 180,   # refills: 1  Last Office Visit : 2/26/21  Future Office visit: 7/2/21    Eye exam:  10/4/19. Letter sent.    Routing refill request to provider for review/approval because:  Eye exam past due

## 2021-06-11 ENCOUNTER — TRANSFERRED RECORDS (OUTPATIENT)
Dept: HEALTH INFORMATION MANAGEMENT | Facility: CLINIC | Age: 46
End: 2021-06-11

## 2021-06-13 ENCOUNTER — HEALTH MAINTENANCE LETTER (OUTPATIENT)
Age: 46
End: 2021-06-13

## 2021-06-15 ENCOUNTER — TELEPHONE (OUTPATIENT)
Dept: RHEUMATOLOGY | Facility: CLINIC | Age: 46
End: 2021-06-15

## 2021-06-15 VITALS — WEIGHT: 157.85 LBS | BODY MASS INDEX: 30.83 KG/M2

## 2021-06-15 NOTE — TELEPHONE ENCOUNTER
Plaquenil Eye Exam    Date of last eye exam specifically commenting on HCQ toxicity: 6/11/2021  Clinic: [facility] Phone Number: [phone]   Toxicity: NO  Records scanned to chart: Yes      Mariela Rucker CMA   6/15/2021 10:49 AM

## 2021-06-29 DIAGNOSIS — M06.4 UNDIFFERENTIATED INFLAMMATORY ARTHRITIS (H): ICD-10-CM

## 2021-07-01 RX ORDER — HYDROXYCHLOROQUINE SULFATE 200 MG/1
200 TABLET, FILM COATED ORAL 2 TIMES DAILY
Qty: 180 TABLET | Refills: 0 | OUTPATIENT
Start: 2021-07-01

## 2021-07-02 ENCOUNTER — VIRTUAL VISIT (OUTPATIENT)
Dept: RHEUMATOLOGY | Facility: CLINIC | Age: 46
End: 2021-07-02
Attending: INTERNAL MEDICINE
Payer: COMMERCIAL

## 2021-07-02 DIAGNOSIS — Z51.81 MEDICATION MONITORING ENCOUNTER: ICD-10-CM

## 2021-07-02 DIAGNOSIS — M25.521 PAIN OF BOTH ELBOWS: ICD-10-CM

## 2021-07-02 DIAGNOSIS — M06.4 UNDIFFERENTIATED INFLAMMATORY ARTHRITIS (H): ICD-10-CM

## 2021-07-02 DIAGNOSIS — M06.4 UNDIFFERENTIATED INFLAMMATORY ARTHRITIS (H): Primary | ICD-10-CM

## 2021-07-02 DIAGNOSIS — M25.522 PAIN OF BOTH ELBOWS: ICD-10-CM

## 2021-07-02 PROCEDURE — 99214 OFFICE O/P EST MOD 30 MIN: CPT | Mod: 95 | Performed by: INTERNAL MEDICINE

## 2021-07-02 ASSESSMENT — PAIN SCALES - GENERAL: PAINLEVEL: NO PAIN (0)

## 2021-07-02 NOTE — PATIENT INSTRUCTIONS
Labs [facility] Thu (will fax) [phone]    Voltaren gel or ibuprofen    Return in 3-4 months (in person or video)

## 2021-07-02 NOTE — PROGRESS NOTES
Gwendolyn is a 45 year old who is being evaluated via a billable video visit.      How would you like to obtain your AVS? MyChart  If the video visit is dropped, the invitation should be resent by: Text to cell phone: [phone]  Will anyone else be joining your video visit? No      Video Start Time: 3:02 PM  Video-Visit Details    Type of service:  Video Visit    Video End Time: 3:30 PM    Originating Location (pt. 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      Gwendolyn was due on 5/21/2021.    She ended up going to [facility] for pre-eclampsia/HELLP on March 25 and had her daughter Miss Glenis Moya via C/S on March 26th 3 lbs 13 oz and 16 inches long.     BP was 200/100, her liver/kidney function was abnormal. plt was down, no tarnsfusion required. C/S recovered well. Her daughter had umbilical hernia surgery, recovered well. She is healthy.    Off lovenox now. Did it x 6 wk post partum.    Arthritis is doing good. Fingers get stiff, sometimes. Her R elbow sometimes gets stiff, achy. AM is worse, depending on the weather. Hard to extent the R elbow. Sometimes it afffects L elbow. Finger pain rotates. Sometimes AM stiffness is 2 hours. No rashes. No CP/SOB/cough. Fully vaccinated, 2nd one caused her chills the next day, lasted a day. Had pfizer.       ROS:  A comprehensive ROS was done, positives are per HPI.            Component      Latest Ref Rng 1/23/2014 1/23/2014          12:00 AM 12:00 AM   Sodium      137 - 145 mmol/L 137    Potassium      3.6 - 5.0 mmol/L 4.4     Chloride      98 - 107 mmol/L 99    CARBON DIOXIDE, TOTAL      22 - 35 mmol/L 26    Anion Gap       12    Glucose      65 - 100 mg/dL 100    Urea Nitrogen      7 - 20 mg/dL 14    Creatinine      0.5 - 1.0 mg/dL 0.7    Calcium      8.4 - 10.2 mg/dL 9.3    Protein Total      6.3 - 8.2 g/dL 7.1    Albumin      3.5 - 5.0 g/dL 4.2    Bilirubin Total      0.2 - 1.3 mg/dL 0.5    Alkaline Phosphatase      38 - 126 U/L 47    AST      14 - 59 U/L 26    ALT      9 - 72 U/L 28    RNP Antibodies      0.0 - 0.9 AI <0.2 <0.2   Kevin Antibodies      0.0 - 0.9 AI <0.2 <0.2   Scleroderma Antibody Scl-70 ANA CRISTINA IgG      0.0 - 0.9 AI  <0.2   Sjogren's Anti-SS-A      0.0 - 0.9 AI  <0.2   Sjogren's Anti-SS-B      0.0 - 0.9 AI  <0.2   Antichromatin Antibodies      0.0 - 0.9 AI  <0.2   Anti-Laura-1      0.0 - 0.9 Al  <0.2   Centomere B Atb      0.0 - 0.9 AI  <0.2   QuantiFERON TB Gold       Neg    QuantiFERON TB Ag Value       0.05    QuantiFERON Nil Value       0.05    QuantiFERON Mitogen Value       >10.00    QFT TB Ag minus Nil Value       0.00    NANCY Screen by EIA       Neg    Hepatitis C PANKAJ      0.0 - 0.9 0.1    Vitamin D,25-Hydroxy      30.0 - 100.0 ng/mL 34.8    HBsAg Screen       Neg    Hepatitis B Core Antibody       Neg    Complement C4      9 - 36 16    Complement C3      90 - 180 153    Rheumatoid Factor      0.0 - 13.9 KIU/L 9.8    C-Reactive Protein      0.0 - 4.9 mg/dL 2.7    Anti-DNA (DS) Ab Qn      0 - 9 IU/mL 9    CCP Antibodies      0 - 19 U/mL 3      Exam: Bilateral wrists, 3 views each. 1/10/2014.    Comparison: None.    Clinical history: Arthropathy.    Findings: 3 views each of the bilateral wrists were obtained. Joint  spaces are well-maintained. No erosive changes are noted. No soft  tissue abnormalities are seen.       Result Impression       Impression: No erosive changes in the bilateral wrists.    AVELINA HIGGINBOTHAM MD  I have personally reviewed the image and initial interpretation, and I  agree with findings.      Exam: Bilateral hands, 3 views each. 1/10/2014.    Comparison: None.    Clinical history: Arthropathy.    Findings: 3 views each of the bilateral hands were obtained. The joint  spaces are well-maintained. No soft tissue abnormalities are noted. No  erosive changes are seen.       Result Impression       Impression: No erosive changes in the bilateral hands.    AVELINA HIGGINBOTHAM MD  I have personally reviewed the image and initial interpretation, and I  agree with findings.     Exam: Sacroiliac joints, 3 views. 1/10/2014.    Comparison: None.    Clinical history: Arthropathy.    Findings: 3 views of the sacroiliac joints were obtained. The  sacroiliac joints are patent. No abnormal sclerosis is noted. No  definite erosive changes are seen.       Result Impression       Impression: No acute bone abnormality in the sacroiliac joints.    AVELINA HIGGINBOTHAM MD  I have personally reviewed the image and initial interpretation, and I  agree with findings.       HLA B27 Typing       Specimen received - Immunology report to follow upon completion. NEGATIVE     Exam: MRI of the sacroiliac joints dated 10/21/2015.  Comparison: 1/10/2014.  Clinical history: Evaluate for sacroiliitis.  Technique: Multiplanar, multisequence MR imaging of the sacroiliac  joints were obtained using standard sequences in 2 orthogonal planes  before and after the uneventful administration of intravenous  gadolinium contrast.  Findings:  No significant fluid in the sacroiliac joints. No abnormal enhancement  to suggest sacroiliitis. No erosive changes are noted.  The muscle bulk is intact without significant atrophy or edema. The  visualized intrapelvic structures are unremarkable. No  lymphadenopathy. No full-thickness tear or tendon retraction.  No abnormal marrow signal to suggest fracture, osteonecrosis, or  marrow infiltration. Nonspecific subtle focus of T2 hyperintensity  within the left iliac bone, coronal series 3 image 10,  likely  vascularity.  Large field-of-view limits evaluation the hip joints. No  full-thickness cartilage loss or joint effusion. The visualized lower  lumbar spine is unremarkable.  IMPRESSION  Impression:  1. No findings to suggest sacroiliitis.  2. No abnormal marrow signal to suggest fracture, osteonecrosis, or  marrow infiltration.  AVELINA HIGGINBOTHAM MD  Component      Latest Ref Rng & Units 12/16/2016   Cardiolipin Antibody IgG      0.0 - 19.9 GPL-U/mL 3.2   Cardiolipin Antibody IgM      0.0 - 19.9 MPL-U/mL 27.7 (H)   Cardiolipin Antibody IgA      0.0 - 19.9 APL U/mL 5.7   Beta 2 Glycoprotein 1 Antibody IgA      <7 U/mL 1.6     Component      Latest Ref Rng & Units 10/13/2017   WBC      4.0 - 11.0 10e9/L 7.3   RBC Count      3.8 - 5.2 10e12/L 4.13   Hemoglobin      11.7 - 15.7 g/dL 12.6   Hematocrit      35.0 - 47.0 % 37.5   MCV      78 - 100 fl 91   MCH      26.5 - 33.0 pg 30.5   MCHC      31.5 - 36.5 g/dL 33.6   RDW      10.0 - 15.0 % 11.8   Platelet Count      150 - 450 10e9/L 283   Diff Method       Automated Method   % Neutrophils      % 63.1   % Lymphocytes      % 25.2   % Monocytes      % 9.0   % Eosinophils      % 1.6   % Basophils      % 0.8   % Immature Granulocytes      % 0.3   Nucleated RBCs      0 /100 0   Absolute Neutrophil      1.6 - 8.3 10e9/L 4.6   Absolute Lymphocytes      0.8 - 5.3 10e9/L 1.8   Absolute Monocytes      0.0 - 1.3 10e9/L 0.7   Absolute Eosinophils      0.0 - 0.7 10e9/L 0.1   Absolute Basophils      0.0 - 0.2 10e9/L 0.1   Abs Immature Granulocytes      0 - 0.4 10e9/L 0.0   Absolute Nucleated RBC       0.0   Color Urine       Yellow   Appearance Urine       Slightly Cloudy   Glucose Urine      NEG:Negative mg/dL Negative   Bilirubin Urine      NEG:Negative Negative   Ketones Urine      NEG:Negative mg/dL Negative   Specific Gravity Urine      1.003 - 1.035 1.015   Blood Urine      NEG:Negative Negative   pH Urine      5.0 - 7.0 pH 5.0   Protein Albumin Urine      NEG:Negative mg/dL  Negative   Urobilinogen mg/dL      0.0 - 2.0 mg/dL 0.0   Nitrite Urine      NEG:Negative Negative   Leukocyte Esterase Urine      NEG:Negative Small (A)   Source       Midstream Urine   WBC Urine      0 - 2 /HPF 2   RBC Urine      0 - 2 /HPF 1   Bacteria Urine      NEG:Negative /HPF Few (A)   Squamous Epithelial /HPF Urine      0 - 1 /HPF 4 (H)   Mucous Urine      NEG:Negative /LPF Present (A)   Creatinine      0.52 - 1.04 mg/dL 0.91   GFR Estimate      >60 mL/min/1.7m2 67   GFR Estimate If Black      >60 mL/min/1.7m2 82   Cardiolipin Antibody IgG      0.0 - 19.9 GPL-U/mL 3.5   Cardiolipin Antibody IgM      0.0 - 19.9 MPL-U/mL 27.0 (H)   Protein Random Urine      g/L 0.18   Protein Total Urine g/gr Creatinine      0 - 0.2 g/g Cr 0.15   NANCY interpretation      NEG:Negative Negative   NANCY titer 1       1:40   Lupus Result      NEG:Negative Negative   Beta 2 Glycoprotein 1 Antibody IgM      <7 U/mL 8.9 (H)   Beta 2 Glycoprotein 1 Antibody IgG      <7 U/mL <0.6   AST      0 - 45 U/L 19   ALT      0 - 50 U/L 35   Albumin      3.4 - 5.0 g/dL 4.0   Complement C4      15 - 50 mg/dL 19   Complement C3      76 - 169 mg/dL 131   CRP Inflammation      0.0 - 8.0 mg/L <2.9   Sed Rate      0 - 20 mm/h 18   DNA-ds      <10 IU/mL 4   Creatinine Urine      mg/dL 124     Component      Latest Ref Rng & Units 10/11/2019   Vitamin D Deficiency screening      20 - 75 ug/L 85 (H)   SSA (Ro) (ANA CRISTINA) Antibody, IgG      0.0 - 0.9 AI <0.2   SSB (La) (ANA CRISTINA) Antibody, IgG      0.0 - 0.9 AI <0.2   NANCY interpretation      NEG:Negative Negative   DNA-ds      <10 IU/mL 4   Complement C3      76 - 169 mg/dL 119   Complement C4      15 - 50 mg/dL 15     Component      Latest Ref Rng & Units 8/10/2020   Color Urine       Yellow   Appearance      Clear Clear   Glucose Urine      neg mg/dL Neg   Bilirubin Urine      neg Neg   Ketones Urine      neg mg/dL Other   Specific Gravity Urine      1.003 - 1.035 1.030   Blood Urine      neg Neg   pH Urine      5.0  - 7.0 pH 7.5   Protein Urine      Negative mg/dL Trace   Urobilinogen Urine      0.2 - 1.0 EU/dL 0.2   Nitrite Urine      NEG Neg   Leukocytes      Negative Negative   WBC Urine      0 - 5 /HPF 3-5   RBC Urine      0 - 5 /HPF 0-2   Epithelial Cells UR      None Seen /LPF Moderate   Bacteria Urine      Negative /HPF Trace   Mucus Urine (External)      Negative /LPF Moderate   WBC      5.0 - 10.0 K/uL 5.5   RBC Count      3.70 - 6.30 M/uL 4.01   Hemoglobin      12.0 - 16.0 g/dL 12.2   Hematocrit      37.0 - 47.0 % 34.7   MCV      80 - 98 fl 87   MCH      27.0 - 31.0 pg 30.4   MCHC      32.0 - 40.0 g/dL 35.1   RDW      11.5 - 14.5 % 11.3   Platelet Count      145 - 375 k/uL 288   % Lymphocytes      0.9 - 44.0 % 23.7   MPV      8.0 - 11.0 fL 9.0   GRANULOCYTES #      1.4 - 9.0 K/uL 3.9   % Granulocytes      43.0 - 76.0 % 71.3   Lymphocytes #      0.9 - 5.4 K/uL 1.3   Mid #      0.0 - 1.8 K/uL 0.3   Mid %      0.0 - 18.0 % 5.0   Creatinine      0.5 - 1.0 mg/dL 0.8   GFR Estimate      CALC 77.341   GFR Estimate If Black      CALC 93.583   Creatine Urine      NOT ESTAB. MG/.3   Protein      NOT ESTAB. MG/DL 8.6   PROTEIN CREAT RATIO      0 - 200 mg/g creat 57   ALT      9 - 72 U/L 19   AST      14 - 59 U/L 30   Albumin      3.5 - 5.0 g/dL 3.9   Vitamin D Total      30.00 - 100.00 ng/mL 70.48   Sed Rate      0 - 20 mm/hr 3   Complement C3      82 - 167 MG/   C-Reactive Protein      0 - 10 MG/L 2   Anti-DNA (DS) Ab Qn      0 - 9 IU/mL 6   CULTURE URINE - NOTE      Text NO GROWTH   Complement C4      14 - 44 MG/DL 16     HISTORY REVIEW:  Past Medical History:   Diagnosis Date     Anxiety 2013     Asthma      Chronic sinusitis 2018     Hypertension      Inflammatory arthritis      Past Surgical History:   Procedure Laterality Date     CHOLECYSTECTOMY       GALLBLADDER SURGERY       GYN SURGERY  2018    both my Fallopian tube, and right ovary, and appendix     HYSTERECTOMY Right     Partial- right ovary removed      TONSILLECTOMY       TONSILLECTOMY  1993     Family History   Problem Relation Age of Onset     Lupus Paternal Aunt         father had double lung transplant for alpha 1 anti-trypsin def     Arthritis Maternal Grandmother         RA     Hypertension Maternal Grandmother      Depression Maternal Grandmother      Arthritis Paternal Grandfather         RA     Family History Negative Other         neg for AS, psoriasis     Gastrointestinal Disease Other         cousin has colitis     Diabetes Father         due to transplant     Allergies Father      Diabetes Other         Aunts on both sides     Hypertension Mother      Allergies Mother      Hypertension Maternal Grandfather      Arthritis Maternal Grandfather      Arthritis Paternal Grandmother      Other - See Comments Other         fibromyalgia - paternal aunt     Diabetes Other      Social History     Socioeconomic History     Marital status:      Spouse name: Not on file     Number of children: 0     Years of education: Not on file     Highest education level: Not on file   Occupational History     Occupation:      Employer: DNR   Social Needs     Financial resource strain: Not on file     Food insecurity     Worry: Not on file     Inability: Not on file     Transportation needs     Medical: Not on file     Non-medical: Not on file   Tobacco Use     Smoking status: Never Smoker     Smokeless tobacco: Never Used   Substance and Sexual Activity     Alcohol use: Yes     Comment: occ     Drug use: No     Sexual activity: Yes     Partners: Male     Birth control/protection: None   Lifestyle     Physical activity     Days per week: Not on file     Minutes per session: Not on file     Stress: Not on file   Relationships     Social connections     Talks on phone: Not on file     Gets together: Not on file     Attends Confucianism service: Not on file     Active member of club or organization: Not on file     Attends meetings of clubs or organizations: Not  on file     Relationship status: Not on file     Intimate partner violence     Fear of current or ex partner: Not on file     Emotionally abused: Not on file     Physically abused: Not on file     Forced sexual activity: Not on file   Other Topics Concern     Not on file   Social History Narrative    2019        ENVIRONMENTAL HISTORY: [place] is heated with a gas furnace and infloor heating. They do have central air conditioning. The patient's bedroom is furnished with carpeting in bedroom and fabric window coverings.  No pets inside the house. There is no history of cockroach or mice infestation. There are no smokers in the house.  The house does not have a basement.        PMHx, FHx, SHx were reviewed, unchanged.    Pregnancy Hx: She is  s/p one miscarriage around 12 wk, another fetal loss through IVF, delivered a baby girl on 3/26/2021.    Outpatient Encounter Medications as of 2021   Medication Sig Dispense Refill     albuterol (VENTOLIN HFA) 108 (90 BASE) MCG/ACT inhaler Inhale 2 Puffs into the lungs four times a day as needed for Wheezing or Shortness of Breath (as needed). Shake before using.       budesonide (RINOCORT AQUA) 32 MCG/ACT nasal spray Spray 1 spray into both nostrils daily Follow-up needed for further refills. 8.6 Bottle 0     escitalopram (LEXAPRO) 10 MG tablet Take 10 mg by mouth daily  0     hydroxychloroquine (PLAQUENIL) 200 MG tablet Take 1 tablet (200 mg) by mouth 2 times daily Get annual eye exams for hydroxychloroquine (Plaquenil) monitoring and fax to [phone] 180 tablet 0     labetalol (NORMODYNE) 200 MG tablet Take 100 mg by mouth 2 times daily       montelukast (SINGULAIR) 10 MG tablet Take 1 tablet by mouth daily  3     Prenatal Multivit-Min-Fe-FA (PRENATAL VITAMINS PO) With iron       tiZANidine (ZANAFLEX) 4 MG tablet Take 4 mg by mouth 3 times daily       traMADol (ULTRAM) 50 MG tablet Take 50 mg by mouth        VITAMIN D, CHOLECALCIFEROL, PO Take 2,000 Units by mouth daily       No facility-administered encounter medications on file as of 7/2/2021.      Allergies   Allergen Reactions     Tetanus Immune Globulin      Fever     Tetanus Toxoid            Ph.E:    GA: NAD, pleasant, her daughter is present  Eyes: nl sclera, conj, EOMI  MSK: no active synovitis but unable to extend R elbow  Skin: no rash  Neuro: non focal  Psych: nl affect          Assessment/ plan:    #seronegative inflammatory arthritis  - H/o seronegative IA Dx 1/2006 with flares of IA in hands, low back pain s/p Tx with prednisone, SSZ and lodine. Received MRI report of L index finger 4/06  which showed L index finger edema from PIP to DIP (non specific finding). Her work up at initial visit in 1/2014 was suggestive of seroneg non-erosive IA. She was recommended to increase lodine to 2 tab a day but could not tolerate it sec GI upset. Has FM TP but FMS can't explain all her pain including pain/tenderness/swelling over PIP joints, AM stiffness> 1hr and good response to steroid/SSZ in the past. For AI, recommended a trial of HCQ. She is on HCQ since 3/2014 with excellent response. Her IA is active with elbow pain/limited ROM of R elbow.     -neg HLA-B27 and neg pelvic MRI 10/2015 for sacroiliitis, SI joint pain resolved.      - continue plaquenil 200mg PO BID, was informed that's safe in breastfeeding.    -Recommend eye exam for HCQ monitoring.     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 She was put on ASA+lovenox during IVF. She failed IVF fresh embryo (two) and leftover frozen embryo (one) transfer and was told given her age her best option would be to use donor embryo. She is recommended to use ASA 81 mg qd+lovenox during future pregnancy  with donor embryo and I agree given her h/o miscarriage at 12 wk. She has no h/o thrombosis. 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    Had embryo transfer on 10/29/2019, unfortunately miscarried at 3 months despite being on ASA+Lovenox. Reportedly, baby stopped growing.     Had another embryo transfer in 9/2020,  This was her last donor embryo. Her TG was 5/21/2021, pregnancy went well but she delivered via C/S on 3/26/2021 due to pre-eclampsia/HELLP. Recovered well. Continues lovenox x 6 wk post partum, now off. No pregnancy plan.s She is happy with having her daughter who is healthy and growing well.    She always had neg NANCY here and no features of SLE.    I reviewed her recent outside labs, which showed improved LFTs. Recommend to continue  mg bid which is safe in breastfeeding with yearly eye exam. We discussed adding another agent to HCQ to control IA, she prefers to wait given breastfeeding. Will treat with voltaren gel+ibuprofen prn and re-evaluate. Will repeat lupus/RA work up.      Plan:    [facility] next Thu (will fax) [phone]    Voltaren gel or ibuprofen    Return in 3-4 months (in person or video)              Orders Placed This Encounter   Procedures     ALT     Albumin level     AST     CBC with platelets differential     Creatinine     Complement C4     Complement C3     CRP inflammation     DNA double stranded antibodies     Erythrocyte sedimentation rate auto     UA with Microscopic reflex to Culture     Protein  random urine with Creat Ratio     Creatinine random urine     Rheumatoid factor     Cyclic Citrullinated Peptide Antibody IgG         Marie Charles MD

## 2021-07-02 NOTE — LETTER
7/2/2021       RE: Gwendolyn Melgar  [address] [facility] [place] [address]     Dear Colleague,    Thank you for referring your patient, Gwendolyn Melgar, to the [facility] [place] at [facility]. Please see a copy of my visit note below.    Gwendolyn is a 45 year old who is being evaluated via a billable video visit.      How would you like to obtain your AVS? MyChart  If the video visit is dropped, the invitation should be resent by: Text to cell phone: [phone]  Will anyone else be joining your video visit? No      Video Start Time: 3:02 PM  Video-Visit Details    Type of service:  Video Visit    Video End Time: 3:30 PM    Originating Location (pt. 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      Gwendolyn was due on 5/21/2021.    She ended up going to [facility] for pre-eclampsia/HELLP on March 25 and had her daughter Miss Glenis Moya via C/S on March 26th 3 lbs 13 oz and 16 inches long.     BP was 200/100, her liver/kidney function was abnormal. plt was down, no tarnsfusion required. C/S recovered well. Her daughter had umbilical hernia surgery, recovered well. She is healthy.    Off lovenox now. Did it x 6 wk post partum.    Arthritis is doing good. Fingers get stiff, sometimes. Her R elbow sometimes gets stiff, achy. AM is worse, depending on the weather. Hard to extent the R elbow. Sometimes it afffects L elbow. Finger pain rotates. Sometimes AM stiffness is 2 hours. No rashes. No CP/SOB/cough. Fully  vaccinated, 2nd one caused her chills the next day, lasted a day. Had pfizer.       ROS:  A comprehensive ROS was done, positives are per HPI.            Component      Latest Ref Rng 1/23/2014 1/23/2014          12:00 AM 12:00 AM   Sodium      137 - 145 mmol/L 137    Potassium      3.6 - 5.0 mmol/L 4.4    Chloride      98 - 107 mmol/L 99    CARBON DIOXIDE, TOTAL      22 - 35 mmol/L 26    Anion Gap       12    Glucose      65 - 100 mg/dL 100    Urea Nitrogen      7 - 20 mg/dL 14    Creatinine      0.5 - 1.0 mg/dL 0.7    Calcium      8.4 - 10.2 mg/dL 9.3    Protein Total      6.3 - 8.2 g/dL 7.1    Albumin      3.5 - 5.0 g/dL 4.2    Bilirubin Total      0.2 - 1.3 mg/dL 0.5    Alkaline Phosphatase      38 - 126 U/L 47    AST      14 - 59 U/L 26    ALT      9 - 72 U/L 28    RNP Antibodies      0.0 - 0.9 AI <0.2 <0.2   Kevin Antibodies      0.0 - 0.9 AI <0.2 <0.2   Scleroderma Antibody Scl-70 ANA CRISTINA IgG      0.0 - 0.9 AI  <0.2   Sjogren's Anti-SS-A      0.0 - 0.9 AI  <0.2   Sjogren's Anti-SS-B      0.0 - 0.9 AI  <0.2   Antichromatin Antibodies      0.0 - 0.9 AI  <0.2   Anti-Laura-1      0.0 - 0.9 Al  <0.2   Centomere B Atb      0.0 - 0.9 AI  <0.2   QuantiFERON TB Gold       Neg    QuantiFERON TB Ag Value       0.05    QuantiFERON Nil Value       0.05    QuantiFERON Mitogen Value       >10.00    QFT TB Ag minus Nil Value       0.00    NANCY Screen by EIA       Neg    Hepatitis C PANKAJ      0.0 - 0.9 0.1    Vitamin D,25-Hydroxy      30.0 - 100.0 ng/mL 34.8    HBsAg Screen       Neg    Hepatitis B Core Antibody       Neg    Complement C4      9 - 36 16    Complement C3      90 - 180 153    Rheumatoid Factor      0.0 - 13.9 KIU/L 9.8    C-Reactive Protein      0.0 - 4.9 mg/dL 2.7    Anti-DNA (DS) Ab Qn      0 - 9 IU/mL 9    CCP Antibodies      0 - 19 U/mL 3      Exam: Bilateral wrists, 3 views each. 1/10/2014.    Comparison: None.    Clinical history: Arthropathy.    Findings: 3 views each of the bilateral wrists were obtained.  Joint  spaces are well-maintained. No erosive changes are noted. No soft  tissue abnormalities are seen.       Result Impression       Impression: No erosive changes in the bilateral wrists.    AVELINA HIGGINBOTHAM MD  I have personally reviewed the image and initial interpretation, and I  agree with findings.     Exam: Bilateral hands, 3 views each. 1/10/2014.    Comparison: None.    Clinical history: Arthropathy.    Findings: 3 views each of the bilateral hands were obtained. The joint  spaces are well-maintained. No soft tissue abnormalities are noted. No  erosive changes are seen.       Result Impression       Impression: No erosive changes in the bilateral hands.    AVELINA HIGGINBOTHAM MD  I have personally reviewed the image and initial interpretation, and I  agree with findings.     Exam: Sacroiliac joints, 3 views. 1/10/2014.    Comparison: None.    Clinical history: Arthropathy.    Findings: 3 views of the sacroiliac joints were obtained. The  sacroiliac joints are patent. No abnormal sclerosis is noted. No  definite erosive changes are seen.       Result Impression       Impression: No acute bone abnormality in the sacroiliac joints.    AVELINA HIGGINBOTHAM MD  I have personally reviewed the image and initial interpretation, and I  agree with findings.       HLA B27 Typing       Specimen received - Immunology report to follow upon completion. NEGATIVE     Exam: MRI of the sacroiliac joints dated 10/21/2015.  Comparison: 1/10/2014.  Clinical history: Evaluate for sacroiliitis.  Technique: Multiplanar, multisequence MR imaging of the sacroiliac  joints were obtained using standard sequences in 2 orthogonal planes  before and after the uneventful administration of intravenous  gadolinium contrast.  Findings:  No significant fluid in the sacroiliac joints. No abnormal enhancement  to suggest sacroiliitis. No erosive changes are noted.  The muscle bulk is intact without significant atrophy or edema. The  visualized  intrapelvic structures are unremarkable. No  lymphadenopathy. No full-thickness tear or tendon retraction.  No abnormal marrow signal to suggest fracture, osteonecrosis, or  marrow infiltration. Nonspecific subtle focus of T2 hyperintensity  within the left iliac bone, coronal series 3 image 10, likely  vascularity.  Large field-of-view limits evaluation the hip joints. No  full-thickness cartilage loss or joint effusion. The visualized lower  lumbar spine is unremarkable.  IMPRESSION  Impression:  1. No findings to suggest sacroiliitis.  2. No abnormal marrow signal to suggest fracture, osteonecrosis, or  marrow infiltration.  AVELINA HIGGINBOTHAM MD  Component      Latest Ref Rng & Units 12/16/2016   Cardiolipin Antibody IgG      0.0 - 19.9 GPL-U/mL 3.2   Cardiolipin Antibody IgM      0.0 - 19.9 MPL-U/mL 27.7 (H)   Cardiolipin Antibody IgA      0.0 - 19.9 APL U/mL 5.7   Beta 2 Glycoprotein 1 Antibody IgA      <7 U/mL 1.6     Component      Latest Ref Rng & Units 10/13/2017   WBC      4.0 - 11.0 10e9/L 7.3   RBC Count      3.8 - 5.2 10e12/L 4.13   Hemoglobin      11.7 - 15.7 g/dL 12.6   Hematocrit      35.0 - 47.0 % 37.5   MCV      78 - 100 fl 91   MCH      26.5 - 33.0 pg 30.5   MCHC      31.5 - 36.5 g/dL 33.6   RDW      10.0 - 15.0 % 11.8   Platelet Count      150 - 450 10e9/L 283   Diff Method       Automated Method   % Neutrophils      % 63.1   % Lymphocytes      % 25.2   % Monocytes      % 9.0   % Eosinophils      % 1.6   % Basophils      % 0.8   % Immature Granulocytes      % 0.3   Nucleated RBCs      0 /100 0   Absolute Neutrophil      1.6 - 8.3 10e9/L 4.6   Absolute Lymphocytes      0.8 - 5.3 10e9/L 1.8   Absolute Monocytes      0.0 - 1.3 10e9/L 0.7   Absolute Eosinophils      0.0 - 0.7 10e9/L 0.1   Absolute Basophils      0.0 - 0.2 10e9/L 0.1   Abs Immature Granulocytes      0 - 0.4 10e9/L 0.0   Absolute Nucleated RBC       0.0   Color Urine       Yellow   Appearance Urine       Slightly Cloudy   Glucose Urine       NEG:Negative mg/dL Negative   Bilirubin Urine      NEG:Negative Negative   Ketones Urine      NEG:Negative mg/dL Negative   Specific Gravity Urine      1.003 - 1.035 1.015   Blood Urine      NEG:Negative Negative   pH Urine      5.0 - 7.0 pH 5.0   Protein Albumin Urine      NEG:Negative mg/dL Negative   Urobilinogen mg/dL      0.0 - 2.0 mg/dL 0.0   Nitrite Urine      NEG:Negative Negative   Leukocyte Esterase Urine      NEG:Negative Small (A)   Source       Midstream Urine   WBC Urine      0 - 2 /HPF 2   RBC Urine      0 - 2 /HPF 1   Bacteria Urine      NEG:Negative /HPF Few (A)   Squamous Epithelial /HPF Urine      0 - 1 /HPF 4 (H)   Mucous Urine      NEG:Negative /LPF Present (A)   Creatinine      0.52 - 1.04 mg/dL 0.91   GFR Estimate      >60 mL/min/1.7m2 67   GFR Estimate If Black      >60 mL/min/1.7m2 82   Cardiolipin Antibody IgG      0.0 - 19.9 GPL-U/mL 3.5   Cardiolipin Antibody IgM      0.0 - 19.9 MPL-U/mL 27.0 (H)   Protein Random Urine      g/L 0.18   Protein Total Urine g/gr Creatinine      0 - 0.2 g/g Cr 0.15   NANCY interpretation      NEG:Negative Negative   NANCY titer 1       1:40   Lupus Result      NEG:Negative Negative   Beta 2 Glycoprotein 1 Antibody IgM      <7 U/mL 8.9 (H)   Beta 2 Glycoprotein 1 Antibody IgG      <7 U/mL <0.6   AST      0 - 45 U/L 19   ALT      0 - 50 U/L 35   Albumin      3.4 - 5.0 g/dL 4.0   Complement C4      15 - 50 mg/dL 19   Complement C3      76 - 169 mg/dL 131   CRP Inflammation      0.0 - 8.0 mg/L <2.9   Sed Rate      0 - 20 mm/h 18   DNA-ds      <10 IU/mL 4   Creatinine Urine      mg/dL 124     Component      Latest Ref Rng & Units 10/11/2019   Vitamin D Deficiency screening      20 - 75 ug/L 85 (H)   SSA (Ro) (ANA CRISTINA) Antibody, IgG      0.0 - 0.9 AI <0.2   SSB (La) (ANA CRISTINA) Antibody, IgG      0.0 - 0.9 AI <0.2   NANCY interpretation      NEG:Negative Negative   DNA-ds      <10 IU/mL 4   Complement C3      76 - 169 mg/dL 119   Complement C4      15 - 50 mg/dL 15      Component      Latest Ref Rng & Units 8/10/2020   Color Urine       Yellow   Appearance      Clear Clear   Glucose Urine      neg mg/dL Neg   Bilirubin Urine      neg Neg   Ketones Urine      neg mg/dL Other   Specific Gravity Urine      1.003 - 1.035 1.030   Blood Urine      neg Neg   pH Urine      5.0 - 7.0 pH 7.5   Protein Urine      Negative mg/dL Trace   Urobilinogen Urine      0.2 - 1.0 EU/dL 0.2   Nitrite Urine      NEG Neg   Leukocytes      Negative Negative   WBC Urine      0 - 5 /HPF 3-5   RBC Urine      0 - 5 /HPF 0-2   Epithelial Cells UR      None Seen /LPF Moderate   Bacteria Urine      Negative /HPF Trace   Mucus Urine (External)      Negative /LPF Moderate   WBC      5.0 - 10.0 K/uL 5.5   RBC Count      3.70 - 6.30 M/uL 4.01   Hemoglobin      12.0 - 16.0 g/dL 12.2   Hematocrit      37.0 - 47.0 % 34.7   MCV      80 - 98 fl 87   MCH      27.0 - 31.0 pg 30.4   MCHC      32.0 - 40.0 g/dL 35.1   RDW      11.5 - 14.5 % 11.3   Platelet Count      145 - 375 k/uL 288   % Lymphocytes      0.9 - 44.0 % 23.7   MPV      8.0 - 11.0 fL 9.0   GRANULOCYTES #      1.4 - 9.0 K/uL 3.9   % Granulocytes      43.0 - 76.0 % 71.3   Lymphocytes #      0.9 - 5.4 K/uL 1.3   Mid #      0.0 - 1.8 K/uL 0.3   Mid %      0.0 - 18.0 % 5.0   Creatinine      0.5 - 1.0 mg/dL 0.8   GFR Estimate      CALC 77.341   GFR Estimate If Black      CALC 93.583   Creatine Urine      NOT ESTAB. MG/.3   Protein      NOT ESTAB. MG/DL 8.6   PROTEIN CREAT RATIO      0 - 200 mg/g creat 57   ALT      9 - 72 U/L 19   AST      14 - 59 U/L 30   Albumin      3.5 - 5.0 g/dL 3.9   Vitamin D Total      30.00 - 100.00 ng/mL 70.48   Sed Rate      0 - 20 mm/hr 3   Complement C3      82 - 167 MG/   C-Reactive Protein      0 - 10 MG/L 2   Anti-DNA (DS) Ab Qn      0 - 9 IU/mL 6   CULTURE URINE - NOTE      Text NO GROWTH   Complement C4      14 - 44 MG/DL 16     HISTORY REVIEW:  Past Medical History:   Diagnosis Date     Anxiety 2013     Asthma       Chronic sinusitis 2018     Hypertension      Inflammatory arthritis      Past Surgical History:   Procedure Laterality Date     CHOLECYSTECTOMY       GALLBLADDER SURGERY       GYN SURGERY  2018    both my Fallopian tube, and right ovary, and appendix     HYSTERECTOMY Right     Partial- right ovary removed     TONSILLECTOMY       TONSILLECTOMY  1993     Family History   Problem Relation Age of Onset     Lupus Paternal Aunt         father had double lung transplant for alpha 1 anti-trypsin def     Arthritis Maternal Grandmother         RA     Hypertension Maternal Grandmother      Depression Maternal Grandmother      Arthritis Paternal Grandfather         RA     Family History Negative Other         neg for AS, psoriasis     Gastrointestinal Disease Other         cousin has colitis     Diabetes Father         due to transplant     Allergies Father      Diabetes Other         Aunts on both sides     Hypertension Mother      Allergies Mother      Hypertension Maternal Grandfather      Arthritis Maternal Grandfather      Arthritis Paternal Grandmother      Other - See Comments Other         fibromyalgia - paternal aunt     Diabetes Other      Social History     Socioeconomic History     Marital status:      Spouse name: Not on file     Number of children: 0     Years of education: Not on file     Highest education level: Not on file   Occupational History     Occupation:      Employer: DNR   Social Needs     Financial resource strain: Not on file     Food insecurity     Worry: Not on file     Inability: Not on file     Transportation needs     Medical: Not on file     Non-medical: Not on file   Tobacco Use     Smoking status: Never Smoker     Smokeless tobacco: Never Used   Substance and Sexual Activity     Alcohol use: Yes     Comment: occ     Drug use: No     Sexual activity: Yes     Partners: Male     Birth control/protection: None   Lifestyle     Physical activity     Days per week: Not on file      Minutes per session: Not on file     Stress: Not on file   Relationships     Social connections     Talks on phone: Not on file     Gets together: Not on file     Attends Christian service: Not on file     Active member of club or organization: Not on file     Attends meetings of clubs or organizations: Not on file     Relationship status: Not on file     Intimate partner violence     Fear of current or ex partner: Not on file     Emotionally abused: Not on file     Physically abused: Not on file     Forced sexual activity: Not on file   Other Topics Concern     Not on file   Social History Narrative    2019        ENVIRONMENTAL HISTORY: [place] is heated with a gas furnace and infloor heating. They do have central air conditioning. The patient's bedroom is furnished with carpeting in bedroom and fabric window coverings.  No pets inside the house. There is no history of cockroach or mice infestation. There are no smokers in the house.  The house does not have a basement.        PMHx, FHx, SHx were reviewed, unchanged.    Pregnancy Hx: She is  s/p one miscarriage around 12 wk, another fetal loss through IVF, delivered a baby girl on 3/26/2021.    Outpatient Encounter Medications as of 2021   Medication Sig Dispense Refill     albuterol (VENTOLIN HFA) 108 (90 BASE) MCG/ACT inhaler Inhale 2 Puffs into the lungs four times a day as needed for Wheezing or Shortness of Breath (as needed). Shake before using.       budesonide (RINOCORT AQUA) 32 MCG/ACT nasal spray Spray 1 spray into both nostrils daily Follow-up needed for further refills. 8.6 Bottle 0     escitalopram (LEXAPRO) 10 MG tablet Take 10 mg by mouth daily  0     hydroxychloroquine (PLAQUENIL) 200 MG tablet Take 1 tablet (200 mg) by mouth 2 times daily Get annual eye exams for hydroxychloroquine (Plaquenil) monitoring and fax to [phone] 180 tablet 0     labetalol (NORMODYNE) 200 MG  tablet Take 100 mg by mouth 2 times daily       montelukast (SINGULAIR) 10 MG tablet Take 1 tablet by mouth daily  3     Prenatal Multivit-Min-Fe-FA (PRENATAL VITAMINS PO) With iron       tiZANidine (ZANAFLEX) 4 MG tablet Take 4 mg by mouth 3 times daily       traMADol (ULTRAM) 50 MG tablet Take 50 mg by mouth       VITAMIN D, CHOLECALCIFEROL, PO Take 2,000 Units by mouth daily       No facility-administered encounter medications on file as of 7/2/2021.      Allergies   Allergen Reactions     Tetanus Immune Globulin      Fever     Tetanus Toxoid            Ph.E:    GA: NAD, pleasant, her daughter is present  Eyes: nl sclera, conj, EOMI  MSK: no active synovitis but unable to extend R elbow  Skin: no rash  Neuro: non focal  Psych: nl affect          Assessment/ plan:    #seronegative inflammatory arthritis  - H/o seronegative IA Dx 1/2006 with flares of IA in hands, low back pain s/p Tx with prednisone, SSZ and lodine. Received MRI report of L index finger 4/06  which showed L index finger edema from PIP to DIP (non specific finding). Her work up at initial visit in 1/2014 was suggestive of seroneg non-erosive IA. She was recommended to increase lodine to 2 tab a day but could not tolerate it sec GI upset. Has FM TP but FMS can't explain all her pain including pain/tenderness/swelling over PIP joints, AM stiffness> 1hr and good response to steroid/SSZ in the past. For AI, recommended a trial of HCQ. She is on HCQ since 3/2014 with excellent response. Her IA is active with elbow pain/limited ROM of R elbow.     -neg HLA-B27 and neg pelvic MRI 10/2015 for sacroiliitis, SI joint pain resolved.      - continue plaquenil 200mg PO BID, was informed that's safe in breastfeeding.    -Recommend eye exam for HCQ monitoring.     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 She was put on ASA+lovenox during IVF. She failed IVF fresh embryo (two) and leftover frozen embryo (one) transfer and was told given her age her best option would be to use donor embryo. She is recommended to use ASA 81 mg qd+lovenox during future pregnancy with donor embryo and I agree given her h/o miscarriage at 12 wk. She has no h/o thrombosis. 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    Had embryo transfer on 10/29/2019, unfortunately miscarried at 3 months despite being on ASA+Lovenox. Reportedly, baby stopped growing.     Had another embryo transfer in 9/2020,  This was her last donor embryo. Her TG was 5/21/2021, pregnancy went well but she delivered via C/S on 3/26/2021 due to pre-eclampsia/HELLP. Recovered well. Continues lovenox x 6 wk post partum, now off. No pregnancy plan.s She is happy with having her daughter who is healthy and growing well.    She always had neg NANCY here and no features of SLE.    I reviewed her recent outside labs, which showed improved LFTs. Recommend to continue  mg bid which is safe in breastfeeding with yearly eye exam. We discussed adding another agent to HCQ to control IA, she prefers to wait given breastfeeding. Will treat with voltaren gel+ibuprofen prn and re-evaluate. Will repeat lupus/RA work up.      Plan:    [facility] (will fax) [phone]    Voltaren gel or ibuprofen    Return in 3-4 months (in person or video)              Orders Placed This Encounter   Procedures     ALT     Albumin level     AST     CBC with platelets differential     Creatinine     Complement C4     Complement C3     CRP inflammation     DNA double stranded antibodies     Erythrocyte sedimentation rate auto     UA with Microscopic reflex to Culture     Protein  random urine with Creat Ratio     Creatinine random urine     Rheumatoid factor     Cyclic Citrullinated  Peptide Antibody IgG         Marie Charles MD

## 2021-07-06 RX ORDER — HYDROXYCHLOROQUINE SULFATE 200 MG/1
200 TABLET, FILM COATED ORAL 2 TIMES DAILY
Qty: 180 TABLET | Refills: 0 | Status: SHIPPED | OUTPATIENT
Start: 2021-07-06 | End: 2021-10-14

## 2021-07-06 NOTE — TELEPHONE ENCOUNTER
Plaquenil    Take 1 tablet (200 mg) by mouth 2 times daily Get annual eye exams for hydroxychloroquine (Plaquenil) monitoring and fax to [phone]   Last Written Prescription Date:  5/22/21  Last Fill Quantity: 180,   # refills: 0  Last Office Visit: 7/2/2021  Future Office visit: 3-4 months  Last Eye Exam: Date of last eye exam specifically commenting on HCQ toxicity: 6/11/2021  Clinic: [facility] Phone Number: [phone]       Cr done 3/30/21=0.82

## 2021-07-07 ENCOUNTER — TELEPHONE (OUTPATIENT)
Dept: RHEUMATOLOGY | Facility: CLINIC | Age: 46
End: 2021-07-07

## 2021-07-07 NOTE — TELEPHONE ENCOUNTER
Lab orders faxed to [facility] at [phone]. Patient notified via [employer] message.     Mariela Rucker CMA   7/7/2021 3:12 PM

## 2021-07-08 LAB
ALBUMIN (URINE): NORMAL
APPEARANCE UR: CLEAR
BACTERIA, UR: NORMAL
BASOPHILS NFR BLD AUTO: NORMAL %
BILIRUB UR QL: NORMAL
COLOR UR: YELLOW
CRYSTALS - QUEST: NORMAL
EOSINOPHIL NFR BLD AUTO: NORMAL %
ERYTHROCYTE [DISTWIDTH] IN BLOOD BY AUTOMATED COUNT: 11.7 %
ERYTHROCYTE [SEDIMENTATION RATE] IN BLOOD: 4 MM/HR
GLUCOSE URINE: NORMAL MG/DL
GRANULAR CASTS: NORMAL
HCT VFR BLD AUTO: 40 %
HEMOGLOBIN: 13.6 G/DL (ref 11.7–15.7)
HGB UR QL: NORMAL
HYALINE CASTS: NORMAL
KETONES UR QL: NORMAL MG/DL
LEUKOCYTE ESTERASE URINE: NORMAL
LYMPHOCYTES NFR BLD AUTO: 20.9 %
MCH RBC QN AUTO: 29 PG
MCHC RBC AUTO-ENTMCNC: 34 G/DL
MCV RBC AUTO: 85 FL
MONOCYTES NFR BLD AUTO: NORMAL %
MUCOUS URINE: NORMAL
NEUTROPHILS NFR BLD AUTO: NORMAL %
NITRITE UR QL STRIP: NORMAL
PH BLDA: 5 (ref 5–7)
PLATELET COUNT - QUEST: 234 10^9/L (ref 150–450)
RBC # BLD AUTO: 4.69 10^12/L
RBC, UR MICRO: NORMAL (ref ?–2)
SP GR UR STRIP: 1.03 (ref 1–1.03)
SQUAMOUS EPI: NORMAL
UROBILINOGEN UR QL STRIP: 0.2 EU/DL (ref 0.2–1)
WBC # BLD AUTO: 6.5 10^9/L
WBC, UR MICRO: NORMAL (ref ?–2)

## 2021-07-21 LAB — C4 SERPL-MCNC: 16 MG/DL (ref 12–38)

## 2021-08-09 LAB
C3 SERPL-MCNC: 152 MG/DL
CCP ANTIBODIES: 5 U/ML
CREATINE URINE: 136.5
CRP SERPL-MCNC: 2 MG/DL (ref 0–0.8)
DNA (DS) ANTIBODIES - QUEST: 7
RH FACTOR: NORMAL

## 2021-10-03 ENCOUNTER — HEALTH MAINTENANCE LETTER (OUTPATIENT)
Age: 46
End: 2021-10-03

## 2021-10-11 DIAGNOSIS — M06.4 UNDIFFERENTIATED INFLAMMATORY ARTHRITIS (H): ICD-10-CM

## 2021-10-14 RX ORDER — HYDROXYCHLOROQUINE SULFATE 200 MG/1
200 TABLET, FILM COATED ORAL 2 TIMES DAILY
Qty: 180 TABLET | Refills: 1 | Status: SHIPPED | OUTPATIENT
Start: 2021-10-14 | End: 2022-04-20

## 2021-10-14 NOTE — TELEPHONE ENCOUNTER
LCV: 7/2/2021  [facility]  NCV: 1-20-22  last eye exam: 6-11-21  outside lab 3-30-21: Creatinine (0.40 - 1.00 mg/dL) 0.82

## 2022-01-20 ENCOUNTER — VIRTUAL VISIT (OUTPATIENT)
Dept: RHEUMATOLOGY | Facility: CLINIC | Age: 47
End: 2022-01-20
Attending: INTERNAL MEDICINE
Payer: COMMERCIAL

## 2022-01-20 DIAGNOSIS — M06.4 UNDIFFERENTIATED INFLAMMATORY ARTHRITIS (H): Primary | ICD-10-CM

## 2022-01-20 PROCEDURE — 99214 OFFICE O/P EST MOD 30 MIN: CPT | Mod: 95 | Performed by: INTERNAL MEDICINE

## 2022-01-20 RX ORDER — METFORMIN HYDROCHLORIDE 750 MG/1
1 TABLET, EXTENDED RELEASE ORAL
COMMUNITY
Start: 2021-12-29

## 2022-01-20 ASSESSMENT — PAIN SCALES - GENERAL: PAINLEVEL: NO PAIN (0)

## 2022-01-20 NOTE — PROGRESS NOTES
Gwendolyn is a 46 year old who is being evaluated via a billable video visit.      How would you like to obtain your AVS? MyChart  If the video visit is dropped, the invitation should be resent by: Text to cell phone: [phone]  Will anyone else be joining your video visit? No      Video Start Time: 10 AM  Video-Visit Details    Type of service:  Video Visit    Video End Time:10:14 AM    Originating Location (pt. Location): Home    Distant Location (provider location):  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    Past visit:      Gwendolyn was due on 5/21/2021.    She ended up going to [facility] for pre-eclampsia/HELLP on March 25 and had her daughter Miss Glenis Moya via C/S on March 26th 3 lbs 13 oz and 16 inches long.     BP was 200/100, her liver/kidney function was abnormal. plt was down, no tarnsfusion required. C/S recovered well. Her daughter had umbilical hernia surgery, recovered well. She is healthy.    Off lovenox now. Did it x 6 wk post partum.    Arthritis is doing good. Fingers get stiff, sometimes. Her R elbow sometimes gets stiff, achy. AM is worse, depending on the weather. Hard to extent the R elbow. Sometimes it afffects L elbow. Finger pain rotates. Sometimes AM stiffness is 2 hours. No rashes. No CP/SOB/cough. Fully vaccinated, 2nd one caused her chills the next day, lasted a day. Had pfizer.     Today 1/20/2022: Gwendolyn is doing well, no flare, continues to have some pain over neck, low back and R shoulder, but it is tolerable. Seeing a chiropractor helps. No flares. Eye exam is due this summer.      ROS:   A comprehensive ROS was done, positives are per HPI.            Component      Latest Ref Rng 1/23/2014 1/23/2014          12:00 AM 12:00 AM   Sodium      137 - 145 mmol/L 137    Potassium      3.6 - 5.0 mmol/L 4.4    Chloride      98 - 107 mmol/L 99    CARBON DIOXIDE, TOTAL      22 - 35 mmol/L 26    Anion Gap       12    Glucose      65 - 100 mg/dL 100    Urea Nitrogen      7 - 20 mg/dL 14    Creatinine      0.5 - 1.0 mg/dL 0.7    Calcium      8.4 - 10.2 mg/dL 9.3    Protein Total      6.3 - 8.2 g/dL 7.1    Albumin      3.5 - 5.0 g/dL 4.2    Bilirubin Total      0.2 - 1.3 mg/dL 0.5    Alkaline Phosphatase      38 - 126 U/L 47    AST      14 - 59 U/L 26    ALT      9 - 72 U/L 28    RNP Antibodies      0.0 - 0.9 AI <0.2 <0.2   Kevin Antibodies      0.0 - 0.9 AI <0.2 <0.2   Scleroderma Antibody Scl-70 ANA CRISTINA IgG      0.0 - 0.9 AI  <0.2   Sjogren's Anti-SS-A      0.0 - 0.9 AI  <0.2   Sjogren's Anti-SS-B      0.0 - 0.9 AI  <0.2   Antichromatin Antibodies      0.0 - 0.9 AI  <0.2   Anti-Laura-1      0.0 - 0.9 Al  <0.2   Centomere B Atb      0.0 - 0.9 AI  <0.2   QuantiFERON TB Gold       Neg    QuantiFERON TB Ag Value       0.05    QuantiFERON Nil Value       0.05    QuantiFERON Mitogen Value       >10.00    QFT TB Ag minus Nil Value       0.00    NANCY Screen by EIA       Neg    Hepatitis C PANKAJ      0.0 - 0.9 0.1    Vitamin D,25-Hydroxy      30.0 - 100.0 ng/mL 34.8    HBsAg Screen       Neg    Hepatitis B Core Antibody       Neg    Complement C4      9 - 36 16    Complement C3      90 - 180 153    Rheumatoid Factor      0.0 - 13.9 KIU/L 9.8    C-Reactive Protein      0.0 - 4.9 mg/dL 2.7    Anti-DNA (DS) Ab Qn      0 - 9 IU/mL 9    CCP Antibodies      0 - 19 U/mL 3      Exam: Bilateral wrists, 3 views each. 1/10/2014.    Comparison: None.    Clinical history: Arthropathy.    Findings: 3 views each of the bilateral wrists were obtained. Joint  spaces are well-maintained. No erosive changes are noted. No soft  tissue abnormalities  are seen.       Result Impression       Impression: No erosive changes in the bilateral wrists.    AVELINA HIGGINBOTHAM MD  I have personally reviewed the image and initial interpretation, and I  agree with findings.     Exam: Bilateral hands, 3 views each. 1/10/2014.    Comparison: None.    Clinical history: Arthropathy.    Findings: 3 views each of the bilateral hands were obtained. The joint  spaces are well-maintained. No soft tissue abnormalities are noted. No  erosive changes are seen.       Result Impression       Impression: No erosive changes in the bilateral hands.    AVELINA HIGGINBOTHAM MD  I have personally reviewed the image and initial interpretation, and I  agree with findings.     Exam: Sacroiliac joints, 3 views. 1/10/2014.    Comparison: None.    Clinical history: Arthropathy.    Findings: 3 views of the sacroiliac joints were obtained. The  sacroiliac joints are patent. No abnormal sclerosis is noted. No  definite erosive changes are seen.       Result Impression       Impression: No acute bone abnormality in the sacroiliac joints.    AVELINA HIGGINBOTHAM MD  I have personally reviewed the image and initial interpretation, and I  agree with findings.       HLA B27 Typing       Specimen received - Immunology report to follow upon completion. NEGATIVE     Exam: MRI of the sacroiliac joints dated 10/21/2015.  Comparison: 1/10/2014.  Clinical history: Evaluate for sacroiliitis.  Technique: Multiplanar, multisequence MR imaging of the sacroiliac  joints were obtained using standard sequences in 2 orthogonal planes  before and after the uneventful administration of intravenous  gadolinium contrast.  Findings:  No significant fluid in the sacroiliac joints. No abnormal enhancement  to suggest sacroiliitis. No erosive changes are noted.  The muscle bulk is intact without significant atrophy or edema. The  visualized intrapelvic structures are unremarkable. No  lymphadenopathy. No full-thickness tear or tendon  retraction.  No abnormal marrow signal to suggest fracture, osteonecrosis, or  marrow infiltration. Nonspecific subtle focus of T2 hyperintensity  within the left iliac bone, coronal series 3 image 10, likely  vascularity.  Large field-of-view limits evaluation the hip joints. No  full-thickness cartilage loss or joint effusion. The visualized lower  lumbar spine is unremarkable.  IMPRESSION  Impression:  1. No findings to suggest sacroiliitis.  2. No abnormal marrow signal to suggest fracture, osteonecrosis, or  marrow infiltration.  AVELINA HIGGINBOTHAM MD  Component      Latest Ref Rng & Units 12/16/2016   Cardiolipin Antibody IgG      0.0 - 19.9 GPL-U/mL 3.2   Cardiolipin Antibody IgM      0.0 - 19.9 MPL-U/mL 27.7 (H)   Cardiolipin Antibody IgA      0.0 - 19.9 APL U/mL 5.7   Beta 2 Glycoprotein 1 Antibody IgA      <7 U/mL 1.6     Component      Latest Ref Rng & Units 10/13/2017   WBC      4.0 - 11.0 10e9/L 7.3   RBC Count      3.8 - 5.2 10e12/L 4.13   Hemoglobin      11.7 - 15.7 g/dL 12.6   Hematocrit      35.0 - 47.0 % 37.5   MCV      78 - 100 fl 91   MCH      26.5 - 33.0 pg 30.5   MCHC      31.5 - 36.5 g/dL 33.6   RDW      10.0 - 15.0 % 11.8   Platelet Count      150 - 450 10e9/L 283   Diff Method       Automated Method   % Neutrophils      % 63.1   % Lymphocytes      % 25.2   % Monocytes      % 9.0   % Eosinophils      % 1.6   % Basophils      % 0.8   % Immature Granulocytes      % 0.3   Nucleated RBCs      0 /100 0   Absolute Neutrophil      1.6 - 8.3 10e9/L 4.6   Absolute Lymphocytes      0.8 - 5.3 10e9/L 1.8   Absolute Monocytes      0.0 - 1.3 10e9/L 0.7   Absolute Eosinophils      0.0 - 0.7 10e9/L 0.1   Absolute Basophils      0.0 - 0.2 10e9/L 0.1   Abs Immature Granulocytes      0 - 0.4 10e9/L 0.0   Absolute Nucleated RBC       0.0   Color Urine       Yellow   Appearance Urine       Slightly Cloudy   Glucose Urine      NEG:Negative mg/dL Negative   Bilirubin Urine      NEG:Negative Negative   Ketones Urine       NEG:Negative mg/dL Negative   Specific Gravity Urine      1.003 - 1.035 1.015   Blood Urine      NEG:Negative Negative   pH Urine      5.0 - 7.0 pH 5.0   Protein Albumin Urine      NEG:Negative mg/dL Negative   Urobilinogen mg/dL      0.0 - 2.0 mg/dL 0.0   Nitrite Urine      NEG:Negative Negative   Leukocyte Esterase Urine      NEG:Negative Small (A)   Source       Midstream Urine   WBC Urine      0 - 2 /HPF 2   RBC Urine      0 - 2 /HPF 1   Bacteria Urine      NEG:Negative /HPF Few (A)   Squamous Epithelial /HPF Urine      0 - 1 /HPF 4 (H)   Mucous Urine      NEG:Negative /LPF Present (A)   Creatinine      0.52 - 1.04 mg/dL 0.91   GFR Estimate      >60 mL/min/1.7m2 67   GFR Estimate If Black      >60 mL/min/1.7m2 82   Cardiolipin Antibody IgG      0.0 - 19.9 GPL-U/mL 3.5   Cardiolipin Antibody IgM      0.0 - 19.9 MPL-U/mL 27.0 (H)   Protein Random Urine      g/L 0.18   Protein Total Urine g/gr Creatinine      0 - 0.2 g/g Cr 0.15   NANCY interpretation      NEG:Negative Negative   NANCY titer 1       1:40   Lupus Result      NEG:Negative Negative   Beta 2 Glycoprotein 1 Antibody IgM      <7 U/mL 8.9 (H)   Beta 2 Glycoprotein 1 Antibody IgG      <7 U/mL <0.6   AST      0 - 45 U/L 19   ALT      0 - 50 U/L 35   Albumin      3.4 - 5.0 g/dL 4.0   Complement C4      15 - 50 mg/dL 19   Complement C3      76 - 169 mg/dL 131   CRP Inflammation      0.0 - 8.0 mg/L <2.9   Sed Rate      0 - 20 mm/h 18   DNA-ds      <10 IU/mL 4   Creatinine Urine      mg/dL 124     Component      Latest Ref Rng & Units 10/11/2019   Vitamin D Deficiency screening      20 - 75 ug/L 85 (H)   SSA (Ro) (ANA CRISTINA) Antibody, IgG      0.0 - 0.9 AI <0.2   SSB (La) (ANA CRISTINA) Antibody, IgG      0.0 - 0.9 AI <0.2   NANCY interpretation      NEG:Negative Negative   DNA-ds      <10 IU/mL 4   Complement C3      76 - 169 mg/dL 119   Complement C4      15 - 50 mg/dL 15     Component      Latest Ref Rng & Units 8/10/2020   Color Urine       Yellow   Appearance      Clear  Clear   Glucose Urine      neg mg/dL Neg   Bilirubin Urine      neg Neg   Ketones Urine      neg mg/dL Other   Specific Gravity Urine      1.003 - 1.035 1.030   Blood Urine      neg Neg   pH Urine      5.0 - 7.0 pH 7.5   Protein Urine      Negative mg/dL Trace   Urobilinogen Urine      0.2 - 1.0 EU/dL 0.2   Nitrite Urine      NEG Neg   Leukocytes      Negative Negative   WBC Urine      0 - 5 /HPF 3-5   RBC Urine      0 - 5 /HPF 0-2   Epithelial Cells UR      None Seen /LPF Moderate   Bacteria Urine      Negative /HPF Trace   Mucus Urine (External)      Negative /LPF Moderate   WBC      5.0 - 10.0 K/uL 5.5   RBC Count      3.70 - 6.30 M/uL 4.01   Hemoglobin      12.0 - 16.0 g/dL 12.2   Hematocrit      37.0 - 47.0 % 34.7   MCV      80 - 98 fl 87   MCH      27.0 - 31.0 pg 30.4   MCHC      32.0 - 40.0 g/dL 35.1   RDW      11.5 - 14.5 % 11.3   Platelet Count      145 - 375 k/uL 288   % Lymphocytes      0.9 - 44.0 % 23.7   MPV      8.0 - 11.0 fL 9.0   GRANULOCYTES #      1.4 - 9.0 K/uL 3.9   % Granulocytes      43.0 - 76.0 % 71.3   Lymphocytes #      0.9 - 5.4 K/uL 1.3   Mid #      0.0 - 1.8 K/uL 0.3   Mid %      0.0 - 18.0 % 5.0   Creatinine      0.5 - 1.0 mg/dL 0.8   GFR Estimate      CALC 77.341   GFR Estimate If Black      CALC 93.583   Creatine Urine      NOT ESTAB. MG/.3   Protein      NOT ESTAB. MG/DL 8.6   PROTEIN CREAT RATIO      0 - 200 mg/g creat 57   ALT      9 - 72 U/L 19   AST      14 - 59 U/L 30   Albumin      3.5 - 5.0 g/dL 3.9   Vitamin D Total      30.00 - 100.00 ng/mL 70.48   Sed Rate      0 - 20 mm/hr 3   Complement C3      82 - 167 MG/   C-Reactive Protein      0 - 10 MG/L 2   Anti-DNA (DS) Ab Qn      0 - 9 IU/mL 6   CULTURE URINE - NOTE      Text NO GROWTH   Complement C4      14 - 44 MG/DL 16     HISTORY REVIEW:  Past Medical History:   Diagnosis Date     Anxiety 2013     Asthma      Chronic sinusitis 2018     Hypertension      Inflammatory arthritis      Past Surgical History:    Procedure Laterality Date     CHOLECYSTECTOMY       GALLBLADDER SURGERY       GYN SURGERY  2018    both my Fallopian tube, and right ovary, and appendix     HYSTERECTOMY Right     Partial- right ovary removed     TONSILLECTOMY       TONSILLECTOMY  1993     Family History   Problem Relation Age of Onset     Lupus Paternal Aunt         father had double lung transplant for alpha 1 anti-trypsin def     Arthritis Maternal Grandmother         RA     Hypertension Maternal Grandmother      Depression Maternal Grandmother      Arthritis Paternal Grandfather         RA     Family History Negative Other         neg for AS, psoriasis     Gastrointestinal Disease Other         cousin has colitis     Diabetes Father         due to transplant     Allergies Father      Diabetes Other         Aunts on both sides     Hypertension Mother      Allergies Mother      Hypertension Maternal Grandfather      Arthritis Maternal Grandfather      Arthritis Paternal Grandmother      Other - See Comments Other         fibromyalgia - paternal aunt     Diabetes Other      Social History     Socioeconomic History     Marital status:      Spouse name: Not on file     Number of children: 0     Years of education: Not on file     Highest education level: Not on file   Occupational History     Occupation:      Employer: DNR   Tobacco Use     Smoking status: Never Smoker     Smokeless tobacco: Never Used   Substance and Sexual Activity     Alcohol use: Yes     Comment: occ     Drug use: No     Sexual activity: Yes     Partners: Male     Birth control/protection: None   Other Topics Concern     Not on file   Social History Narrative    August 12, 2019        ENVIRONMENTAL HISTORY: [place] is heated with a gas furnace and infloor heating. They do have central air conditioning. The patient's bedroom is furnished with carpeting in bedroom and fabric window coverings.  No pets  inside the house. There is no history of cockroach or mice infestation. There are no smokers in the house.  The house does not have a basement.      Social Determinants of Health     Financial Resource Strain: Not on file   Food Insecurity: Not on file   Transportation Needs: Not on file   Physical Activity: Not on file   Stress: Not on file   Social Connections: Not on file   Intimate Partner Violence: Not on file   Housing Stability: Not on file       PMHx, FHx, SHx were reviewed, unchanged.    Pregnancy Hx: She is  s/p one miscarriage around 12 wk, another fetal loss through IVF, delivered a baby girl on 3/26/2021.    Outpatient Encounter Medications as of 2022   Medication Sig Dispense Refill     albuterol (VENTOLIN HFA) 108 (90 BASE) MCG/ACT inhaler Inhale 2 Puffs into the lungs four times a day as needed for Wheezing or Shortness of Breath (as needed). Shake before using.       budesonide (RINOCORT AQUA) 32 MCG/ACT nasal spray Spray 1 spray into both nostrils daily Follow-up needed for further refills. 8.6 Bottle 0     escitalopram (LEXAPRO) 10 MG tablet Take 10 mg by mouth daily  0     hydroxychloroquine (PLAQUENIL) 200 MG tablet TAKE 1 TABLET (200 MG) BY MOUTH 2 TIMES DAILY GET ANNUAL EYE EXAMS FOR HYDROXYCHLOROQUINE (PLAQUENIL) MONITORING AND FAX -[phone] tablet 1     labetalol (NORMODYNE) 200 MG tablet Take 100 mg by mouth 2 times daily       metFORMIN (GLUCOPHAGE-XR) 750 MG 24 hr tablet Take 1 tablet by mouth       montelukast (SINGULAIR) 10 MG tablet Take 1 tablet by mouth daily  3     tiZANidine (ZANAFLEX) 4 MG tablet Take 4 mg by mouth 3 times daily       traMADol (ULTRAM) 50 MG tablet Take 50 mg by mouth       VITAMIN D, CHOLECALCIFEROL, PO Take 2,000 Units by mouth daily       Prenatal Multivit-Min-Fe-FA (PRENATAL VITAMINS PO) With iron (Patient not taking: Reported on 2022)       No facility-administered encounter medications on file as of 2022.     Allergies   Allergen  Reactions     Tetanus Immune Globulin      Fever     Tetanus Toxoid            Ph.E:    GA: NAD, pleasant, her daughter is present  Eyes: nl sclera, conj, EOMI  MSK: no active synovitis   Skin: no rash  Neuro: non focal  Psych: nl affect          Assessment/ plan:    #seronegative inflammatory arthritis  - H/o seronegative IA Dx 1/2006 with flares of IA in hands, low back pain s/p Tx with prednisone, SSZ and lodine. Received MRI report of L index finger 4/06  which showed L index finger edema from PIP to DIP (non specific finding). Her work up at initial visit in 1/2014 was suggestive of seroneg non-erosive IA. She was recommended to increase lodine to 2 tab a day but could not tolerate it sec GI upset. Has FM TP but FMS can't explain all her pain including pain/tenderness/swelling over PIP joints, AM stiffness> 1hr and good response to steroid/SSZ in the past. For AI, recommended a trial of HCQ. She is on HCQ since 3/2014 with excellent response.     Her IA is under fair control.     -neg HLA-B27 and neg pelvic MRI 10/2015 for sacroiliitis, SI joint pain resolved.      - continue plaquenil 200mg PO BID, was informed that's safe in breastfeeding.    -Recommend eye exam for HCQ monitoring.     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 She was put on ASA+lovenox during IVF. She failed IVF fresh embryo (two) and leftover frozen embryo (one) transfer and was told given her age her best option would be to use donor embryo. She is recommended to use ASA 81 mg qd+lovenox during future pregnancy with donor embryo and I agree given her h/o miscarriage at 12 wk. She has no h/o thrombosis. 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    Had embryo transfer on  10/29/2019, unfortunately miscarried at 3 months despite being on ASA+Lovenox. Reportedly, baby stopped growing.     Had another embryo transfer in 9/2020,  This was her last donor embryo. Her TG was 5/21/2021, pregnancy went well but she delivered via C/S on 3/26/2021 due to pre-eclampsia/HELLP. Recovered well. Continues lovenox x 6 wk post partum, now off. No pregnancy plan. She is happy with having her daughter who is healthy and growing well.    She always had neg NANCY here and no features of SLE.    I reviewed her recent outside labs, which showed improved LFTs. Recommend to continue  mg bid which is safe in breastfeeding with yearly eye exam. Recent labs were reviewed and they were stable.      Plan:    Keep 5/6 appointment (in person)                Marie Charles MD

## 2022-01-20 NOTE — LETTER
1/20/2022       RE: Gwendolyn Melgar  [id number] [facility] [address]     Dear Colleague,    Thank you for referring your patient, Gwendolyn Melgar, to the [facility] RHEUMATOLOGY CLINIC [place] at [facility]. Please see a copy of my visit note below.    Gwendolyn is a 46 year old who is being evaluated via a billable video visit.      How would you like to obtain your AVS? MyChart  If the video visit is dropped, the invitation should be resent by: Text to cell phone: [phone]  Will anyone else be joining your video visit? No      Video Start Time: 10 AM  Video-Visit Details    Type of service:  Video Visit    Video End Time:10:14 AM    Originating Location (pt. 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    Past visit:      Gwendolyn was due on 5/21/2021.    She ended up going to [facility] for pre-eclampsia/HELLP on March 25 and had her daughter Miss Glenis Moya via C/S on March 26th 3 lbs 13 oz and 16 inches long.     BP was 200/100, her liver/kidney function was abnormal. plt was down, no tarnsfusion required. C/S recovered well. Her daughter had umbilical hernia surgery, recovered well. She is healthy.    Off lovenox now. Did it x 6 wk post partum.    Arthritis is doing good. Fingers get stiff, sometimes. Her R elbow sometimes gets stiff, achy. AM is worse, depending on the weather. Hard to extent the R elbow. Sometimes it afffects L elbow. Finger pain rotates. Sometimes AM stiffness is 2 hours. No rashes. No  CP/SOB/cough. Fully vaccinated, 2nd one caused her chills the next day, lasted a day. Had pfizer.     Today 1/20/2022: Gwendolyn is doing well, no flare, continues to have some pain over neck, low back and R shoulder, but it is tolerable. Seeing a chiropractor helps. No flares. Eye exam is due this summer.      ROS:  A comprehensive ROS was done, positives are per HPI.            Component      Latest Ref Rng 1/23/2014 1/23/2014          12:00 AM 12:00 AM   Sodium      137 - 145 mmol/L 137    Potassium      3.6 - 5.0 mmol/L 4.4    Chloride      98 - 107 mmol/L 99    CARBON DIOXIDE, TOTAL      22 - 35 mmol/L 26    Anion Gap       12    Glucose      65 - 100 mg/dL 100    Urea Nitrogen      7 - 20 mg/dL 14    Creatinine      0.5 - 1.0 mg/dL 0.7    Calcium      8.4 - 10.2 mg/dL 9.3    Protein Total      6.3 - 8.2 g/dL 7.1    Albumin      3.5 - 5.0 g/dL 4.2    Bilirubin Total      0.2 - 1.3 mg/dL 0.5    Alkaline Phosphatase      38 - 126 U/L 47    AST      14 - 59 U/L 26    ALT      9 - 72 U/L 28    RNP Antibodies      0.0 - 0.9 AI <0.2 <0.2   Kevin Antibodies      0.0 - 0.9 AI <0.2 <0.2   Scleroderma Antibody Scl-70 ANA CRISTINA IgG      0.0 - 0.9 AI  <0.2   Sjogren's Anti-SS-A      0.0 - 0.9 AI  <0.2   Sjogren's Anti-SS-B      0.0 - 0.9 AI  <0.2   Antichromatin Antibodies      0.0 - 0.9 AI  <0.2   Anti-Laura-1      0.0 - 0.9 Al  <0.2   Centomere B Atb      0.0 - 0.9 AI  <0.2   QuantiFERON TB Gold       Neg    QuantiFERON TB Ag Value       0.05    QuantiFERON Nil Value       0.05    QuantiFERON Mitogen Value       >10.00    QFT TB Ag minus Nil Value       0.00    NANCY Screen by EIA       Neg    Hepatitis C PANKAJ      0.0 - 0.9 0.1    Vitamin D,25-Hydroxy      30.0 - 100.0 ng/mL 34.8    HBsAg Screen       Neg    Hepatitis B Core Antibody       Neg    Complement C4      9 - 36 16    Complement C3      90 - 180 153    Rheumatoid Factor      0.0 - 13.9 KIU/L 9.8    C-Reactive Protein      0.0 - 4.9 mg/dL 2.7    Anti-DNA (DS) Ab Qn      0 - 9  IU/mL 9    CCP Antibodies      0 - 19 U/mL 3      Exam: Bilateral wrists, 3 views each. 1/10/2014.    Comparison: None.    Clinical history: Arthropathy.    Findings: 3 views each of the bilateral wrists were obtained. Joint  spaces are well-maintained. No erosive changes are noted. No soft  tissue abnormalities are seen.       Result Impression       Impression: No erosive changes in the bilateral wrists.    AVELINA HIGGINBOTHAM MD  I have personally reviewed the image and initial interpretation, and I  agree with findings.     Exam: Bilateral hands, 3 views each. 1/10/2014.    Comparison: None.    Clinical history: Arthropathy.    Findings: 3 views each of the bilateral hands were obtained. The joint  spaces are well-maintained. No soft tissue abnormalities are noted. No  erosive changes are seen.       Result Impression       Impression: No erosive changes in the bilateral hands.    AVELINA HIGGINBOTHAM MD  I have personally reviewed the image and initial interpretation, and I  agree with findings.     Exam: Sacroiliac joints, 3 views. 1/10/2014.    Comparison: None.    Clinical history: Arthropathy.    Findings: 3 views of the sacroiliac joints were obtained. The  sacroiliac joints are patent. No abnormal sclerosis is noted. No  definite erosive changes are seen.       Result Impression       Impression: No acute bone abnormality in the sacroiliac joints.    AVELINA HIGGINBOTHAM MD  I have personally reviewed the image and initial interpretation, and I  agree with findings.       HLA B27 Typing       Specimen received - Immunology report to follow upon completion. NEGATIVE     Exam: MRI of the sacroiliac joints dated 10/21/2015.  Comparison: 1/10/2014.  Clinical history: Evaluate for sacroiliitis.  Technique: Multiplanar, multisequence MR imaging of the sacroiliac  joints were obtained using standard sequences in 2 orthogonal planes  before and after the uneventful administration of intravenous  gadolinium  contrast.  Findings:  No significant fluid in the sacroiliac joints. No abnormal enhancement  to suggest sacroiliitis. No erosive changes are noted.  The muscle bulk is intact without significant atrophy or edema. The  visualized intrapelvic structures are unremarkable. No  lymphadenopathy. No full-thickness tear or tendon retraction.  No abnormal marrow signal to suggest fracture, osteonecrosis, or  marrow infiltration. Nonspecific subtle focus of T2 hyperintensity  within the left iliac bone, coronal series 3 image 10, likely  vascularity.  Large field-of-view limits evaluation the hip joints. No  full-thickness cartilage loss or joint effusion. The visualized lower  lumbar spine is unremarkable.  IMPRESSION  Impression:  1. No findings to suggest sacroiliitis.  2. No abnormal marrow signal to suggest fracture, osteonecrosis, or  marrow infiltration.  AVELINA HIGGINBOTHAM MD  Component      Latest Ref Rng & Units 12/16/2016   Cardiolipin Antibody IgG      0.0 - 19.9 GPL-U/mL 3.2   Cardiolipin Antibody IgM      0.0 - 19.9 MPL-U/mL 27.7 (H)   Cardiolipin Antibody IgA      0.0 - 19.9 APL U/mL 5.7   Beta 2 Glycoprotein 1 Antibody IgA      <7 U/mL 1.6     Component      Latest Ref Rng & Units 10/13/2017   WBC      4.0 - 11.0 10e9/L 7.3   RBC Count      3.8 - 5.2 10e12/L 4.13   Hemoglobin      11.7 - 15.7 g/dL 12.6   Hematocrit      35.0 - 47.0 % 37.5   MCV      78 - 100 fl 91   MCH      26.5 - 33.0 pg 30.5   MCHC      31.5 - 36.5 g/dL 33.6   RDW      10.0 - 15.0 % 11.8   Platelet Count      150 - 450 10e9/L 283   Diff Method       Automated Method   % Neutrophils      % 63.1   % Lymphocytes      % 25.2   % Monocytes      % 9.0   % Eosinophils      % 1.6   % Basophils      % 0.8   % Immature Granulocytes      % 0.3   Nucleated RBCs      0 /100 0   Absolute Neutrophil      1.6 - 8.3 10e9/L 4.6   Absolute Lymphocytes      0.8 - 5.3 10e9/L 1.8   Absolute Monocytes      0.0 - 1.3 10e9/L 0.7   Absolute Eosinophils      0.0 - 0.7  10e9/L 0.1   Absolute Basophils      0.0 - 0.2 10e9/L 0.1   Abs Immature Granulocytes      0 - 0.4 10e9/L 0.0   Absolute Nucleated RBC       0.0   Color Urine       Yellow   Appearance Urine       Slightly Cloudy   Glucose Urine      NEG:Negative mg/dL Negative   Bilirubin Urine      NEG:Negative Negative   Ketones Urine      NEG:Negative mg/dL Negative   Specific Gravity Urine      1.003 - 1.035 1.015   Blood Urine      NEG:Negative Negative   pH Urine      5.0 - 7.0 pH 5.0   Protein Albumin Urine      NEG:Negative mg/dL Negative   Urobilinogen mg/dL      0.0 - 2.0 mg/dL 0.0   Nitrite Urine      NEG:Negative Negative   Leukocyte Esterase Urine      NEG:Negative Small (A)   Source       Midstream Urine   WBC Urine      0 - 2 /HPF 2   RBC Urine      0 - 2 /HPF 1   Bacteria Urine      NEG:Negative /HPF Few (A)   Squamous Epithelial /HPF Urine      0 - 1 /HPF 4 (H)   Mucous Urine      NEG:Negative /LPF Present (A)   Creatinine      0.52 - 1.04 mg/dL 0.91   GFR Estimate      >60 mL/min/1.7m2 67   GFR Estimate If Black      >60 mL/min/1.7m2 82   Cardiolipin Antibody IgG      0.0 - 19.9 GPL-U/mL 3.5   Cardiolipin Antibody IgM      0.0 - 19.9 MPL-U/mL 27.0 (H)   Protein Random Urine      g/L 0.18   Protein Total Urine g/gr Creatinine      0 - 0.2 g/g Cr 0.15   NANCY interpretation      NEG:Negative Negative   NANCY titer 1       1:40   Lupus Result      NEG:Negative Negative   Beta 2 Glycoprotein 1 Antibody IgM      <7 U/mL 8.9 (H)   Beta 2 Glycoprotein 1 Antibody IgG      <7 U/mL <0.6   AST      0 - 45 U/L 19   ALT      0 - 50 U/L 35   Albumin      3.4 - 5.0 g/dL 4.0   Complement C4      15 - 50 mg/dL 19   Complement C3      76 - 169 mg/dL 131   CRP Inflammation      0.0 - 8.0 mg/L <2.9   Sed Rate      0 - 20 mm/h 18   DNA-ds      <10 IU/mL 4   Creatinine Urine      mg/dL 124     Component      Latest Ref Rng & Units 10/11/2019   Vitamin D Deficiency screening      20 - 75 ug/L 85 (H)   SSA (Ro) (ANA CRISTINA) Antibody, IgG      0.0 -  0.9 AI <0.2   SSB (La) (ANA CRISTINA) Antibody, IgG      0.0 - 0.9 AI <0.2   NANCY interpretation      NEG:Negative Negative   DNA-ds      <10 IU/mL 4   Complement C3      76 - 169 mg/dL 119   Complement C4      15 - 50 mg/dL 15     Component      Latest Ref Rng & Units 8/10/2020   Color Urine       Yellow   Appearance      Clear Clear   Glucose Urine      neg mg/dL Neg   Bilirubin Urine      neg Neg   Ketones Urine      neg mg/dL Other   Specific Gravity Urine      1.003 - 1.035 1.030   Blood Urine      neg Neg   pH Urine      5.0 - 7.0 pH 7.5   Protein Urine      Negative mg/dL Trace   Urobilinogen Urine      0.2 - 1.0 EU/dL 0.2   Nitrite Urine      NEG Neg   Leukocytes      Negative Negative   WBC Urine      0 - 5 /HPF 3-5   RBC Urine      0 - 5 /HPF 0-2   Epithelial Cells UR      None Seen /LPF Moderate   Bacteria Urine      Negative /HPF Trace   Mucus Urine (External)      Negative /LPF Moderate   WBC      5.0 - 10.0 K/uL 5.5   RBC Count      3.70 - 6.30 M/uL 4.01   Hemoglobin      12.0 - 16.0 g/dL 12.2   Hematocrit      37.0 - 47.0 % 34.7   MCV      80 - 98 fl 87   MCH      27.0 - 31.0 pg 30.4   MCHC      32.0 - 40.0 g/dL 35.1   RDW      11.5 - 14.5 % 11.3   Platelet Count      145 - 375 k/uL 288   % Lymphocytes      0.9 - 44.0 % 23.7   MPV      8.0 - 11.0 fL 9.0   GRANULOCYTES #      1.4 - 9.0 K/uL 3.9   % Granulocytes      43.0 - 76.0 % 71.3   Lymphocytes #      0.9 - 5.4 K/uL 1.3   Mid #      0.0 - 1.8 K/uL 0.3   Mid %      0.0 - 18.0 % 5.0   Creatinine      0.5 - 1.0 mg/dL 0.8   GFR Estimate      CALC 77.341   GFR Estimate If Black      CALC 93.583   Creatine Urine      NOT ESTAB. MG/.3   Protein      NOT ESTAB. MG/DL 8.6   PROTEIN CREAT RATIO      0 - 200 mg/g creat 57   ALT      9 - 72 U/L 19   AST      14 - 59 U/L 30   Albumin      3.5 - 5.0 g/dL 3.9   Vitamin D Total      30.00 - 100.00 ng/mL 70.48   Sed Rate      0 - 20 mm/hr 3   Complement C3      82 - 167 MG/   C-Reactive Protein      0 - 10 MG/L 2    Anti-DNA (DS) Ab Qn      0 - 9 IU/mL 6   CULTURE URINE - NOTE      Text NO GROWTH   Complement C4      14 - 44 MG/DL 16     HISTORY REVIEW:  Past Medical History:   Diagnosis Date     Anxiety 2013     Asthma      Chronic sinusitis 2018     Hypertension      Inflammatory arthritis      Past Surgical History:   Procedure Laterality Date     CHOLECYSTECTOMY       GALLBLADDER SURGERY       GYN SURGERY  2018    both my Fallopian tube, and right ovary, and appendix     HYSTERECTOMY Right     Partial- right ovary removed     TONSILLECTOMY       TONSILLECTOMY  1993     Family History   Problem Relation Age of Onset     Lupus Paternal Aunt         father had double lung transplant for alpha 1 anti-trypsin def     Arthritis Maternal Grandmother         RA     Hypertension Maternal Grandmother      Depression Maternal Grandmother      Arthritis Paternal Grandfather         RA     Family History Negative Other         neg for AS, psoriasis     Gastrointestinal Disease Other         cousin has colitis     Diabetes Father         due to transplant     Allergies Father      Diabetes Other         Aunts on both sides     Hypertension Mother      Allergies Mother      Hypertension Maternal Grandfather      Arthritis Maternal Grandfather      Arthritis Paternal Grandmother      Other - See Comments Other         fibromyalgia - paternal aunt     Diabetes Other      Social History     Socioeconomic History     Marital status:      Spouse name: Not on file     Number of children: 0     Years of education: Not on file     Highest education level: Not on file   Occupational History     Occupation:      Employer: DNR   Tobacco Use     Smoking status: Never Smoker     Smokeless tobacco: Never Used   Substance and Sexual Activity     Alcohol use: Yes     Comment: occ     Drug use: No     Sexual activity: Yes     Partners: Male     Birth control/protection: None   Other Topics Concern     Not on file   Social History  Narrative    2019        ENVIRONMENTAL HISTORY: [place] is heated with a gas furnace and infloor heating. They do have central air conditioning. The patient's bedroom is furnished with carpeting in bedroom and fabric window coverings.  No pets inside the house. There is no history of cockroach or mice infestation. There are no smokers in the house.  The house does not have a basement.      Social Determinants of Health     Financial Resource Strain: Not on file   Food Insecurity: Not on file   Transportation Needs: Not on file   Physical Activity: Not on file   Stress: Not on file   Social Connections: Not on file   Intimate Partner Violence: Not on file   Housing Stability: Not on file       PMHx, FHx, SHx were reviewed, unchanged.    Pregnancy Hx: She is  s/p one miscarriage around 12 wk, another fetal loss through IVF, delivered a baby girl on 3/26/2021.    Outpatient Encounter Medications as of 2022   Medication Sig Dispense Refill     albuterol (VENTOLIN HFA) 108 (90 BASE) MCG/ACT inhaler Inhale 2 Puffs into the lungs four times a day as needed for Wheezing or Shortness of Breath (as needed). Shake before using.       budesonide (RINOCORT AQUA) 32 MCG/ACT nasal spray Spray 1 spray into both nostrils daily Follow-up needed for further refills. 8.6 Bottle 0     escitalopram (LEXAPRO) 10 MG tablet Take 10 mg by mouth daily  0     hydroxychloroquine (PLAQUENIL) 200 MG tablet TAKE 1 TABLET (200 MG) BY MOUTH 2 TIMES DAILY GET ANNUAL EYE EXAMS FOR HYDROXYCHLOROQUINE (PLAQUENIL) MONITORING AND FAX -[phone] tablet 1     labetalol (NORMODYNE) 200 MG tablet Take 100 mg by mouth 2 times daily       metFORMIN (GLUCOPHAGE-XR) 750 MG 24 hr tablet Take 1 tablet by mouth       montelukast (SINGULAIR) 10 MG tablet Take 1 tablet by mouth daily  3     tiZANidine (ZANAFLEX) 4 MG tablet Take 4 mg by mouth 3 times daily       traMADol (ULTRAM) 50 MG  tablet Take 50 mg by mouth       VITAMIN D, CHOLECALCIFEROL, PO Take 2,000 Units by mouth daily       Prenatal Multivit-Min-Fe-FA (PRENATAL VITAMINS PO) With iron (Patient not taking: Reported on 1/20/2022)       No facility-administered encounter medications on file as of 1/20/2022.     Allergies   Allergen Reactions     Tetanus Immune Globulin      Fever     Tetanus Toxoid            Ph.E:    GA: NAD, pleasant, her daughter is present  Eyes: nl sclera, conj, EOMI  MSK: no active synovitis   Skin: no rash  Neuro: non focal  Psych: nl affect          Assessment/ plan:    #seronegative inflammatory arthritis  - H/o seronegative IA Dx 1/2006 with flares of IA in hands, low back pain s/p Tx with prednisone, SSZ and lodine. Received MRI report of L index finger 4/06  which showed L index finger edema from PIP to DIP (non specific finding). Her work up at initial visit in 1/2014 was suggestive of seroneg non-erosive IA. She was recommended to increase lodine to 2 tab a day but could not tolerate it sec GI upset. Has FM TP but FMS can't explain all her pain including pain/tenderness/swelling over PIP joints, AM stiffness> 1hr and good response to steroid/SSZ in the past. For AI, recommended a trial of HCQ. She is on HCQ since 3/2014 with excellent response.     Her IA is under fair control.     -neg HLA-B27 and neg pelvic MRI 10/2015 for sacroiliitis, SI joint pain resolved.      - continue plaquenil 200mg PO BID, was informed that's safe in breastfeeding.    -Recommend eye exam for HCQ monitoring.     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 She was put on ASA+lovenox during IVF. She failed IVF fresh embryo (two) and leftover frozen embryo (one) transfer and was told given her age her best option would be to  use donor embryo. She is recommended to use ASA 81 mg qd+lovenox during future pregnancy with donor embryo and I agree given her h/o miscarriage at 12 wk. She has no h/o thrombosis. 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    Had embryo transfer on 10/29/2019, unfortunately miscarried at 3 months despite being on ASA+Lovenox. Reportedly, baby stopped growing.     Had another embryo transfer in 9/2020,  This was her last donor embryo. Her TG was 5/21/2021, pregnancy went well but she delivered via C/S on 3/26/2021 due to pre-eclampsia/HELLP. Recovered well. Continues lovenox x 6 wk post partum, now off. No pregnancy plan. She is happy with having her daughter who is healthy and growing well.    She always had neg NANCY here and no features of SLE.    I reviewed her recent outside labs, which showed improved LFTs. Recommend to continue  mg bid which is safe in breastfeeding with yearly eye exam. Recent labs were reviewed and they were stable.    Plan:    Keep 5/6 appointment (in person)    Marie Charles MD

## 2022-04-05 DIAGNOSIS — M06.4 UNDIFFERENTIATED INFLAMMATORY ARTHRITIS (H): ICD-10-CM

## 2022-04-07 RX ORDER — HYDROXYCHLOROQUINE SULFATE 200 MG/1
200 TABLET, FILM COATED ORAL 2 TIMES DAILY
Qty: 180 TABLET | Refills: 0 | OUTPATIENT
Start: 2022-04-07

## 2022-04-07 NOTE — TELEPHONE ENCOUNTER
Plaquenil refused: pt needs lab done for refill- pt to call clinic  Cr. Lab past due, ( last outside 3-30-21)  Cr lab order in Psychiatric, 7-2-21  FYI to clinic, scheduling

## 2022-04-11 ENCOUNTER — TELEPHONE (OUTPATIENT)
Dept: RHEUMATOLOGY | Facility: CLINIC | Age: 47
End: 2022-04-11
Payer: COMMERCIAL

## 2022-04-11 DIAGNOSIS — Z51.81 MEDICATION MONITORING ENCOUNTER: ICD-10-CM

## 2022-04-11 DIAGNOSIS — M06.4 UNDIFFERENTIATED INFLAMMATORY ARTHRITIS (H): Primary | ICD-10-CM

## 2022-04-11 NOTE — TELEPHONE ENCOUNTER
[facility] Call Center    Phone Message    May a detailed message be left on voicemail: yes     Reason for Call: Order(s): Other:   Reason for requested: lab orders faxed to [facility] in [place] @  [phone]    Provider name: Dr. Charles      Action Taken: Message routed to:  [facility] Rheum    Travel Screening: Not Applicable

## 2022-04-15 NOTE — TELEPHONE ENCOUNTER
My chart message has been sent to pt letting her know that no labs are needed.    Yolanda Posey RN  Rheumatology Clinic

## 2022-04-20 DIAGNOSIS — M06.4 UNDIFFERENTIATED INFLAMMATORY ARTHRITIS (H): ICD-10-CM

## 2022-04-20 RX ORDER — HYDROXYCHLOROQUINE SULFATE 200 MG/1
200 TABLET, FILM COATED ORAL 2 TIMES DAILY
Qty: 180 TABLET | Refills: 0 | Status: SHIPPED | OUTPATIENT
Start: 2022-04-20 | End: 2022-05-06

## 2022-04-20 NOTE — TELEPHONE ENCOUNTER
Plaquenil      Last Written Prescription Date:  10/14/21  Last Fill Quantity: 180,   # refills: 1  Last Office Visit: 1/20/22  Future Office visit: 5/6/22  Last Eye Exam:6/11/21

## 2022-05-06 ENCOUNTER — OFFICE VISIT (OUTPATIENT)
Dept: RHEUMATOLOGY | Facility: CLINIC | Age: 47
End: 2022-05-06
Attending: INTERNAL MEDICINE
Payer: COMMERCIAL

## 2022-05-06 VITALS
BODY MASS INDEX: 29.49 KG/M2 | OXYGEN SATURATION: 95 % | DIASTOLIC BLOOD PRESSURE: 77 MMHG | HEART RATE: 76 BPM | SYSTOLIC BLOOD PRESSURE: 124 MMHG | WEIGHT: 151 LBS

## 2022-05-06 DIAGNOSIS — M06.4 UNDIFFERENTIATED INFLAMMATORY ARTHRITIS (H): Primary | ICD-10-CM

## 2022-05-06 PROCEDURE — 99214 OFFICE O/P EST MOD 30 MIN: CPT | Performed by: INTERNAL MEDICINE

## 2022-05-06 RX ORDER — HYDROXYCHLOROQUINE SULFATE 200 MG/1
TABLET, FILM COATED ORAL
Qty: 135 TABLET | Refills: 0 | Status: SHIPPED | OUTPATIENT
Start: 2022-05-06 | End: 2022-10-07

## 2022-05-06 ASSESSMENT — PAIN SCALES - GENERAL: PAINLEVEL: NO PAIN (0)
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
## 2022-05-06 NOTE — PATIENT INSTRUCTIONS
Taper the plaquenil to 1 tab a day except Mon/Wed/Fri to take 2 tabs a day    Labs in 3 months    Return in 4-5 months (in person)
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
## 2022-05-15 ENCOUNTER — HEALTH MAINTENANCE LETTER (OUTPATIENT)
Age: 47
End: 2022-05-15

## 2022-07-10 ENCOUNTER — HEALTH MAINTENANCE LETTER (OUTPATIENT)
Age: 47
End: 2022-07-10

## 2022-09-10 ENCOUNTER — HEALTH MAINTENANCE LETTER (OUTPATIENT)
Age: 47
End: 2022-09-10

## 2022-10-07 ENCOUNTER — OFFICE VISIT (OUTPATIENT)
Dept: RHEUMATOLOGY | Facility: CLINIC | Age: 47
End: 2022-10-07
Attending: INTERNAL MEDICINE
Payer: COMMERCIAL

## 2022-10-07 VITALS
WEIGHT: 150.1 LBS | HEART RATE: 61 BPM | HEIGHT: 60 IN | TEMPERATURE: 97.5 F | BODY MASS INDEX: 29.47 KG/M2 | SYSTOLIC BLOOD PRESSURE: 138 MMHG | OXYGEN SATURATION: 99 % | DIASTOLIC BLOOD PRESSURE: 68 MMHG

## 2022-10-07 DIAGNOSIS — Z51.81 MEDICATION MONITORING ENCOUNTER: ICD-10-CM

## 2022-10-07 DIAGNOSIS — M06.4 UNDIFFERENTIATED INFLAMMATORY ARTHRITIS (H): Primary | ICD-10-CM

## 2022-10-07 PROCEDURE — 99214 OFFICE O/P EST MOD 30 MIN: CPT | Performed by: INTERNAL MEDICINE

## 2022-10-07 PROCEDURE — G0463 HOSPITAL OUTPT CLINIC VISIT: HCPCS

## 2022-10-07 RX ORDER — HYDROXYCHLOROQUINE SULFATE 200 MG/1
200 TABLET, FILM COATED ORAL DAILY
Qty: 90 TABLET | Refills: 0 | Status: SHIPPED | OUTPATIENT
Start: 2022-10-07 | End: 2022-10-13

## 2022-10-07 RX ORDER — ESTRADIOL 0.5 MG/1
0.5 TABLET ORAL DAILY
COMMUNITY

## 2022-10-07 ASSESSMENT — PAIN SCALES - GENERAL: PAINLEVEL: NO PAIN (0)

## 2022-10-07 NOTE — NURSING NOTE
Chief Complaint   Patient presents with     RECHECK     Follow-up      /68 (BP Location: Right arm, Patient Position: Sitting, Cuff Size: Adult Regular)   Pulse 61   Temp 97.5  F (36.4  C) (Oral)   Ht 1.524 m (5')   Wt 68.1 kg (150 lb 1.6 oz)   SpO2 99%   BMI 29.31 kg/m      Britney Sadler on 10/7/2022 at 1:04 PM
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
## 2022-10-10 ENCOUNTER — MYC MEDICAL ADVICE (OUTPATIENT)
Dept: RHEUMATOLOGY | Facility: CLINIC | Age: 47
End: 2022-10-10

## 2022-10-12 DIAGNOSIS — M06.4 UNDIFFERENTIATED INFLAMMATORY ARTHRITIS (H): ICD-10-CM

## 2022-10-13 NOTE — TELEPHONE ENCOUNTER
HYDROXYCHLOROQUINE 200MG TAB  Pt/pharmacy requesting refills with order.  Please review and send per Providers orders.    Belinda Herrera RN  Central Triage Red Flags/Med Refills

## 2022-10-17 RX ORDER — HYDROXYCHLOROQUINE SULFATE 200 MG/1
200 TABLET, FILM COATED ORAL DAILY
Qty: 90 TABLET | Refills: 1 | Status: SHIPPED | OUTPATIENT
Start: 2022-10-17 | End: 2023-02-03

## 2023-05-25 ENCOUNTER — VIRTUAL VISIT (OUTPATIENT)
Dept: RHEUMATOLOGY | Facility: CLINIC | Age: 48
End: 2023-05-25
Attending: INTERNAL MEDICINE
Payer: COMMERCIAL

## 2023-05-25 DIAGNOSIS — Z51.81 MEDICATION MONITORING ENCOUNTER: ICD-10-CM

## 2023-05-25 DIAGNOSIS — M06.4 UNDIFFERENTIATED INFLAMMATORY ARTHRITIS (H): Primary | ICD-10-CM

## 2023-05-25 PROCEDURE — 99213 OFFICE O/P EST LOW 20 MIN: CPT | Mod: VID | Performed by: INTERNAL MEDICINE

## 2023-05-25 RX ORDER — LOSARTAN POTASSIUM AND HYDROCHLOROTHIAZIDE 12.5; 5 MG/1; MG/1
1 TABLET ORAL
COMMUNITY
Start: 2023-04-25
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
## 2023-05-25 NOTE — NURSING NOTE
Vitamin D 1,000 mg    Is the patient currently in the state of [place]? YES    Visit mode:VIDEO    If the visit is dropped, the patient can be reconnected by: VIDEO VISIT: Text to cell phone: [phone]    Will anyone else be joining the visit? NO      How would you like to obtain your AVS? MyChart    Are changes needed to the allergy or medication list? NO    Reason for visit: RECHECK (Follow up)

## 2023-06-03 ENCOUNTER — HEALTH MAINTENANCE LETTER (OUTPATIENT)
Age: 48
End: 2023-06-03

## 2023-06-27 DIAGNOSIS — M06.4 UNDIFFERENTIATED INFLAMMATORY ARTHRITIS (H): ICD-10-CM

## 2023-06-28 ENCOUNTER — TRANSFERRED RECORDS (OUTPATIENT)
Dept: HEALTH INFORMATION MANAGEMENT | Facility: CLINIC | Age: 48
End: 2023-06-28
Payer: COMMERCIAL

## 2023-06-29 ENCOUNTER — TELEPHONE (OUTPATIENT)
Dept: RHEUMATOLOGY | Facility: CLINIC | Age: 48
End: 2023-06-29
Payer: COMMERCIAL

## 2023-06-29 RX ORDER — HYDROXYCHLOROQUINE SULFATE 200 MG/1
TABLET, FILM COATED ORAL
Qty: 135 TABLET | Refills: 3 | Status: SHIPPED | OUTPATIENT
Start: 2023-06-29 | End: 2023-09-29

## 2023-06-29 NOTE — TELEPHONE ENCOUNTER
Plaquenil      Last Written Prescription Date:  2/3/23  Last Fill Quantity: 135,   # refills: 1  Last Office Visit: 5/25/23  Future Office visit: 0    Last Eye Exam:    Refill Team has reviewed [facility] and [facility].    Found - date: 6/28/23  Refill for 90+ 3 (from date of eye exam)

## 2023-06-29 NOTE — TELEPHONE ENCOUNTER
Plaquenil Eye Exam    Date of last eye exam specifically commenting on HCQ toxicity: 6/28/2023  Clinic: [facility]   Ophthalmologist: Suni Odell  Toxicity: NO  Records scanned to chart: Yes  Follow up: 12 months      Mariela Rucker CMA   6/29/2023 8:56 AM

## 2023-07-23 ENCOUNTER — HEALTH MAINTENANCE LETTER (OUTPATIENT)
Age: 48
End: 2023-07-23

## 2023-09-25 DIAGNOSIS — M06.4 UNDIFFERENTIATED INFLAMMATORY ARTHRITIS (H): ICD-10-CM

## 2023-09-28 NOTE — TELEPHONE ENCOUNTER
hydroxychloroquine (PLAQUENIL) 200 MG tablet     Plaquenil      Last Written Prescription Date:  6-  Last Fill Quantity: 135,   # refills: 3  Last Office Visit: 5-  Future Office visit: 11-    EYE exam from [facility]  on  06-    Labs completed on :8-3-2022    Creatinine (External) 0.5 - 1.0 mg/dL 0.9

## 2023-09-29 RX ORDER — HYDROXYCHLOROQUINE SULFATE 200 MG/1
TABLET, FILM COATED ORAL
Qty: 135 TABLET | Refills: 1 | Status: SHIPPED | OUTPATIENT
Start: 2023-09-29 | End: 2024-06-04

## 2023-11-30 ENCOUNTER — VIRTUAL VISIT (OUTPATIENT)
Dept: RHEUMATOLOGY | Facility: CLINIC | Age: 48
End: 2023-11-30
Attending: INTERNAL MEDICINE
Payer: COMMERCIAL

## 2023-11-30 DIAGNOSIS — Z51.81 MEDICATION MONITORING ENCOUNTER: ICD-10-CM

## 2023-11-30 DIAGNOSIS — M06.4 UNDIFFERENTIATED INFLAMMATORY ARTHRITIS (H): Primary | ICD-10-CM

## 2023-11-30 PROCEDURE — 99213 OFFICE O/P EST LOW 20 MIN: CPT | Mod: VID | Performed by: INTERNAL MEDICINE

## 2023-11-30 ASSESSMENT — PAIN SCALES - GENERAL: PAINLEVEL: NO PAIN (0)

## 2023-11-30 NOTE — NURSING NOTE
Is the patient currently in the state of [place]? YES    Visit mode:VIDEO    If the visit is dropped, the patient can be reconnected by: VIDEO VISIT: Text to cell phone:   Telephone Information:   Mobile [phone]       Will anyone else be joining the visit? NO  (If patient encounters technical issues they should call [phone]    How would you like to obtain your AVS? MyChart    Are changes needed to the allergy or medication list? No    Reason for visit: No chief complaint on file.    Bethany TOSCANO
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
## 2023-12-08 ENCOUNTER — TELEPHONE (OUTPATIENT)
Dept: RHEUMATOLOGY | Facility: CLINIC | Age: 48
End: 2023-12-08
Payer: COMMERCIAL

## 2023-12-08 NOTE — TELEPHONE ENCOUNTER
Patient Contacted for the patient to call back and schedule the following:    Appointment type: Return visit  Provider: Dr. Charles  Return date: November 20th 2024  Specialty phone number: [phone]  Additional appointment(s) needed: NA  Additonal Notes: NA

## 2024-03-14 ENCOUNTER — TELEPHONE (OUTPATIENT)
Dept: RHEUMATOLOGY | Facility: CLINIC | Age: 49
End: 2024-03-14
Payer: COMMERCIAL

## 2024-03-14 NOTE — TELEPHONE ENCOUNTER
Patient Contacted for the patient to call back and schedule the following:    Appointment type: Return Visit  Provider: Dr. Charles  Return date: November 6th 2024  Specialty phone number: [phone]  Additional appointment(s) needed: NA  Additonal Notes: Rescheduled from November 15th 2024

## 2024-05-25 DIAGNOSIS — M06.4 UNDIFFERENTIATED INFLAMMATORY ARTHRITIS (H): ICD-10-CM

## 2024-05-31 NOTE — TELEPHONE ENCOUNTER
hydroxychloroquine (PLAQUENIL) 200 MG tablet 135 tablet 1 9/29/2023 -- No   Sig: TAKE 2 TABS BY MOUTH ON MON,WED AND FRIDAY AND TAKE1 TAB BY MOUTH ALL OTHER DAYS OF THE WEEK     Last Office Visit : 11/30/23 - Araceli  Future Office visit:  11/6/24      Date of last eye exam specifically commenting on HCQ toxicity: 6/28/23  Clinic: [facility]  Source: Media tab    Creatinine   Date Value Ref Range Status   08/10/2020 0.8 0.5 - 1.0 mg/dL Final       Routing refill request to provider for review/approval because:  No creatinine on file in last 12 mo. Due for next eye exam June/July 2024

## 2024-06-04 RX ORDER — HYDROXYCHLOROQUINE SULFATE 200 MG/1
TABLET, FILM COATED ORAL
Qty: 135 TABLET | Refills: 1 | Status: SHIPPED | OUTPATIENT
Start: 2024-06-04

## 2024-06-28 ENCOUNTER — TRANSFERRED RECORDS (OUTPATIENT)
Dept: HEALTH INFORMATION MANAGEMENT | Facility: CLINIC | Age: 49
End: 2024-06-28

## 2024-07-07 ENCOUNTER — HEALTH MAINTENANCE LETTER (OUTPATIENT)
Age: 49
End: 2024-07-07

## 2024-07-31 ENCOUNTER — TRANSFERRED RECORDS (OUTPATIENT)
Dept: HEALTH INFORMATION MANAGEMENT | Facility: CLINIC | Age: 49
End: 2024-07-31
Payer: COMMERCIAL

## 2025-01-06 ENCOUNTER — MYC MEDICAL ADVICE (OUTPATIENT)
Dept: RHEUMATOLOGY | Facility: CLINIC | Age: 50
End: 2025-01-06
Payer: COMMERCIAL

## 2025-01-06 DIAGNOSIS — M06.4 UNDIFFERENTIATED INFLAMMATORY ARTHRITIS (H): ICD-10-CM

## 2025-01-06 NOTE — TELEPHONE ENCOUNTER
hydroxychloroquine (PLAQUENIL) 200 MG tablet   135 tablet 1 6/4/2024     Last Office Visit : 11-  --Return in a year (in person)   Future Office visit:  5-      Care Everywhere lab    Creatinine    done on  7-  [facility]       EYE exam on    6-  EYE exam [facility]

## 2025-01-13 RX ORDER — HYDROXYCHLOROQUINE SULFATE 200 MG/1
TABLET, FILM COATED ORAL
Qty: 135 TABLET | Refills: 1 | OUTPATIENT
Start: 2025-01-13

## 2025-01-13 RX ORDER — HYDROXYCHLOROQUINE SULFATE 200 MG/1
TABLET, FILM COATED ORAL
Qty: 135 TABLET | Refills: 1 | Status: SHIPPED | OUTPATIENT
Start: 2025-01-13

## 2025-01-13 NOTE — TELEPHONE ENCOUNTER
Hard copy of Eye Report has been sent to HIM for scanning. Paty Leone,[facility]  Rheumatology & ID  [address] [place] [address] [phone]

## 2025-01-13 NOTE — TELEPHONE ENCOUNTER
Eye exam received. Charted in flow sheet. Will ask MA staff to print copy and place in basket for scanning.      Hydroxychloroquine (Plaquenil)     TAKE 2 TABLETS BY MOUTH ON MON, WED, FRI AND TAKE 1 TABLET ON ALL OTHER DAYS OF THE WEEK.   Last Written Prescription Date:  6/4/24  Last Fill Quantity: 135,   # refills: 1  Last Office Visit: 11/30/23  Future Office visit:  5/30/25    CBC RESULTS:   Recent Labs   Lab Test 07/08/21  0000   WBC 6.5   RBC 4.69   HGB 13.6   HCT 40   MCV 85   MCH 29   MCHC 34   RDW 11.7          Creatinine   Date Value Ref Range Status   08/10/2020 0.8 0.5 - 1.0 mg/dL Final   ]    Liver Function Studies -   Recent Labs   Lab Test 08/10/20  0000   ALBUMIN 3.9   AST 30   ALT 19         Last eye exam 6/28/24-cleared to continue plaquenil. Refilled per protocol.      Leia Samaniego RN  [facility]

## 2025-04-11 ENCOUNTER — OFFICE VISIT (OUTPATIENT)
Dept: RHEUMATOLOGY | Facility: CLINIC | Age: 50
End: 2025-04-11
Attending: INTERNAL MEDICINE
Payer: COMMERCIAL

## 2025-04-11 VITALS
HEART RATE: 90 BPM | WEIGHT: 172 LBS | BODY MASS INDEX: 33.77 KG/M2 | SYSTOLIC BLOOD PRESSURE: 99 MMHG | HEIGHT: 60 IN | DIASTOLIC BLOOD PRESSURE: 69 MMHG | OXYGEN SATURATION: 96 % | TEMPERATURE: 97.9 F

## 2025-04-11 DIAGNOSIS — M79.7 FIBROMYALGIA: Primary | ICD-10-CM

## 2025-04-11 PROCEDURE — 99213 OFFICE O/P EST LOW 20 MIN: CPT | Performed by: INTERNAL MEDICINE

## 2025-04-11 ASSESSMENT — PAIN SCALES - GENERAL: PAINLEVEL_OUTOF10: NO PAIN (0)
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
## 2025-04-11 NOTE — NURSING NOTE
Chief Complaint   Patient presents with    RECHECK     Arthritis follow up     BP 99/69 (BP Location: Right arm, Patient Position: Sitting)   Pulse 90   Temp 97.9  F (36.6  C) (Oral)   Ht 1.524 m (5')   Wt 78 kg (172 lb)   SpO2 96%   BMI 33.59 kg/m    Andrea Friedman CMA on 4/11/2025 at 11:30 AM
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
## 2025-04-14 ENCOUNTER — MYC MEDICAL ADVICE (OUTPATIENT)
Dept: RHEUMATOLOGY | Facility: CLINIC | Age: 50
End: 2025-04-14
Payer: COMMERCIAL

## 2025-07-07 DIAGNOSIS — M19.90 UNDIFFERENTIATED INFLAMMATORY ARTHRITIS: ICD-10-CM

## 2025-07-10 NOTE — TELEPHONE ENCOUNTER
Last Written Prescription:  hydroxychloroquine (PLAQUENIL) 200 MG tablet 135 tablet 1 1/13/2025 -- No   Sig: TAKE 2 TABLETS BY MOUTH ON MON, WED, FRI AND TAKE 1 TABLET ON ALL OTHER DAYS OF THE WEEK.     ----------------------  Last Visit Date: 4-11-25  Future Visit Date: 4-15-26    Last eye exam: 6-28-24: epic fowsheet  7-31-24 outside lab: Cr  ----------------------      Refill decision:   [] Medication refilled per  Medication Refill in Ambulatory Care  policy.  [x] Medication unable to be refilled by RN due to: Other:  Overdue eye exam        Request from pharmacy:  Requested Prescriptions   Pending Prescriptions Disp Refills    hydroxychloroquine (PLAQUENIL) 200 MG tablet [Pharmacy Med Name: Hydroxychloroquine Sulfate 200 MG Oral Tablet (Plaquenil)] 135 tablet 1     Sig: TAKE 2 TABLETS BY MOUTH ON MON, WED, FRI AND TAKE 1 TABLET ON ALL OTHER DAYS OF THE WEEK.       There is no refill protocol information for this order

## 2025-07-11 ENCOUNTER — TRANSFERRED RECORDS (OUTPATIENT)
Dept: HEALTH INFORMATION MANAGEMENT | Facility: CLINIC | Age: 50
End: 2025-07-11
Payer: COMMERCIAL

## 2025-07-11 NOTE — TELEPHONE ENCOUNTER
Hydroxychloroquine (Plaquenil) 200mg tabs. TAKE 2 TABLETS BY MOUTH ON MON, WED, FRI AND TAKE 1 TABLET ON ALL OTHER DAYS OF THE WEEK.   Last Written Prescription Date:  1/13/25  Last Fill Quantity: 135,   # refills: 1  Last Office Visit: 4/11/25  Future Office visit:  4/15/26    Creatinine   Date Value Ref Range Status   08/10/2020 0.8 0.5 - 1.0 mg/dL Final         Routing refill request to provider for review/approval because:  Last eye exam 6/2024. Mychart sent to patient.         Leia Samaniego RN  [facility]

## 2025-07-12 RX ORDER — HYDROXYCHLOROQUINE SULFATE 200 MG/1
TABLET, FILM COATED ORAL
Qty: 135 TABLET | Refills: 0 | Status: SHIPPED | OUTPATIENT
Start: 2025-07-12 | End: 2025-07-16 | Stop reason: SINTOL

## 2025-07-13 ENCOUNTER — HEALTH MAINTENANCE LETTER (OUTPATIENT)
Age: 50
End: 2025-07-13

## 2025-07-15 NOTE — TELEPHONE ENCOUNTER
"Eye exam from 7/11 received. Per exam notes, \"retinal toxicity present. Patient should stop high risk medication.\"    Routing to Dr. Charles.     Leia SUTTON RN  [facility]    "

## 2025-07-16 ENCOUNTER — VIRTUAL VISIT (OUTPATIENT)
Dept: RHEUMATOLOGY | Facility: CLINIC | Age: 50
End: 2025-07-16
Attending: INTERNAL MEDICINE
Payer: COMMERCIAL

## 2025-07-16 VITALS — BODY MASS INDEX: 31.41 KG/M2 | WEIGHT: 160 LBS | HEIGHT: 60 IN

## 2025-07-16 DIAGNOSIS — M19.90 UNDIFFERENTIATED INFLAMMATORY ARTHRITIS: ICD-10-CM

## 2025-07-16 DIAGNOSIS — Z51.81 MEDICATION MONITORING ENCOUNTER: Primary | ICD-10-CM

## 2025-07-16 ASSESSMENT — PAIN SCALES - GENERAL: PAINLEVEL_OUTOF10: NO PAIN (0)

## 2025-07-16 NOTE — TELEPHONE ENCOUNTER
Relayed Dr. Charles's recommendation to stop HYDROXYCHLOROQUINE. Patient scheduled today at 1pm video with Dr. Charles to discuss alternatives.     Leia SUTTON RN  [facility]
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
## 2025-07-16 NOTE — NURSING NOTE
Current patient location: [address] [place] [id number]    Is the patient currently in the state of [place]? YES    Visit mode: VIDEO    If the visit is dropped, the patient can be reconnected by:VIDEO VISIT: Text to cell phone:   Telephone Information:   Mobile [phone]       Will anyone else be joining the visit? NO  (If patient encounters technical issues they should call [phone]    Are changes needed to the allergy or medication list? No    Are refills needed on medications prescribed by this physician? NO    Rooming Documentation:  Questionnaire(s) completed    Reason for visit: RECHECK    Gabriela COSBYF
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
## 2025-07-18 RX ORDER — HYDROXYCHLOROQUINE SULFATE 200 MG/1
TABLET, FILM COATED ORAL
Qty: 135 TABLET | Refills: 1 | OUTPATIENT
Start: 2025-07-18

## 2025-08-03 ENCOUNTER — HEALTH MAINTENANCE LETTER (OUTPATIENT)
Age: 50
End: 2025-08-03